# Patient Record
Sex: FEMALE | Race: WHITE | Employment: OTHER | URBAN - METROPOLITAN AREA
[De-identification: names, ages, dates, MRNs, and addresses within clinical notes are randomized per-mention and may not be internally consistent; named-entity substitution may affect disease eponyms.]

---

## 2018-07-27 ENCOUNTER — APPOINTMENT (OUTPATIENT)
Dept: CT IMAGING | Age: 83
DRG: 419 | End: 2018-07-27
Attending: PHYSICIAN ASSISTANT
Payer: MEDICARE

## 2018-07-27 ENCOUNTER — HOSPITAL ENCOUNTER (INPATIENT)
Age: 83
LOS: 3 days | Discharge: HOME | DRG: 419 | End: 2018-07-30
Attending: INTERNAL MEDICINE | Admitting: SURGERY
Payer: MEDICARE

## 2018-07-27 DIAGNOSIS — E87.1 HYPONATREMIA: ICD-10-CM

## 2018-07-27 DIAGNOSIS — K85.90 ACUTE PANCREATITIS, UNSPECIFIED COMPLICATION STATUS, UNSPECIFIED PANCREATITIS TYPE: Primary | ICD-10-CM

## 2018-07-27 DIAGNOSIS — K80.00 CALCULUS OF GALLBLADDER WITH ACUTE CHOLECYSTITIS WITHOUT OBSTRUCTION: ICD-10-CM

## 2018-07-27 DIAGNOSIS — K80.20 CHOLELITHIASIS WITHOUT CHOLECYSTITIS: ICD-10-CM

## 2018-07-27 LAB
ALBUMIN SERPL-MCNC: 3.2 G/DL (ref 3.4–5)
ALBUMIN/GLOB SERPL: 1 {RATIO} (ref 0.8–1.7)
ALP SERPL-CCNC: 67 U/L (ref 45–117)
ALT SERPL-CCNC: 20 U/L (ref 13–56)
ANION GAP SERPL CALC-SCNC: 5 MMOL/L (ref 3–18)
APPEARANCE UR: CLEAR
AST SERPL-CCNC: 26 U/L (ref 15–37)
BACTERIA URNS QL MICRO: ABNORMAL /HPF
BASOPHILS # BLD: 0 K/UL (ref 0–0.1)
BASOPHILS NFR BLD: 0 % (ref 0–2)
BILIRUB SERPL-MCNC: 0.6 MG/DL (ref 0.2–1)
BILIRUB UR QL: NEGATIVE
BUN SERPL-MCNC: 16 MG/DL (ref 7–18)
BUN/CREAT SERPL: 18 (ref 12–20)
CALCIUM SERPL-MCNC: 8.9 MG/DL (ref 8.5–10.1)
CHLORIDE SERPL-SCNC: 95 MMOL/L (ref 100–108)
CK MB CFR SERPL CALC: NORMAL % (ref 0–4)
CK MB SERPL-MCNC: <1 NG/ML (ref 5–25)
CK SERPL-CCNC: 59 U/L (ref 26–192)
CO2 SERPL-SCNC: 28 MMOL/L (ref 21–32)
COLOR UR: YELLOW
CREAT SERPL-MCNC: 0.88 MG/DL (ref 0.6–1.3)
DIFFERENTIAL METHOD BLD: ABNORMAL
EOSINOPHIL # BLD: 0.2 K/UL (ref 0–0.4)
EOSINOPHIL NFR BLD: 3 % (ref 0–5)
EPITH CASTS URNS QL MICRO: ABNORMAL /LPF (ref 0–5)
ERYTHROCYTE [DISTWIDTH] IN BLOOD BY AUTOMATED COUNT: 12.7 % (ref 11.6–14.5)
GLOBULIN SER CALC-MCNC: 3.3 G/DL (ref 2–4)
GLUCOSE SERPL-MCNC: 92 MG/DL (ref 74–99)
GLUCOSE UR STRIP.AUTO-MCNC: NEGATIVE MG/DL
HCT VFR BLD AUTO: 37.3 % (ref 35–45)
HGB BLD-MCNC: 12.6 G/DL (ref 12–16)
HGB UR QL STRIP: NEGATIVE
KETONES UR QL STRIP.AUTO: NEGATIVE MG/DL
LEUKOCYTE ESTERASE UR QL STRIP.AUTO: ABNORMAL
LIPASE SERPL-CCNC: 883 U/L (ref 73–393)
LYMPHOCYTES # BLD: 0.9 K/UL (ref 0.9–3.6)
LYMPHOCYTES NFR BLD: 13 % (ref 21–52)
MCH RBC QN AUTO: 31.9 PG (ref 24–34)
MCHC RBC AUTO-ENTMCNC: 33.8 G/DL (ref 31–37)
MCV RBC AUTO: 94.4 FL (ref 74–97)
MONOCYTES # BLD: 0.8 K/UL (ref 0.05–1.2)
MONOCYTES NFR BLD: 12 % (ref 3–10)
NEUTS SEG # BLD: 5.3 K/UL (ref 1.8–8)
NEUTS SEG NFR BLD: 72 % (ref 40–73)
NITRITE UR QL STRIP.AUTO: NEGATIVE
PH UR STRIP: 5.5 [PH] (ref 5–8)
PLATELET # BLD AUTO: 255 K/UL (ref 135–420)
PMV BLD AUTO: 9.9 FL (ref 9.2–11.8)
POTASSIUM SERPL-SCNC: 4.1 MMOL/L (ref 3.5–5.5)
PROT SERPL-MCNC: 6.5 G/DL (ref 6.4–8.2)
PROT UR STRIP-MCNC: NEGATIVE MG/DL
RBC # BLD AUTO: 3.95 M/UL (ref 4.2–5.3)
RBC #/AREA URNS HPF: ABNORMAL /HPF (ref 0–5)
SODIUM SERPL-SCNC: 128 MMOL/L (ref 136–145)
SP GR UR REFRACTOMETRY: 1.01 (ref 1–1.03)
TROPONIN I SERPL-MCNC: <0.02 NG/ML (ref 0–0.06)
UROBILINOGEN UR QL STRIP.AUTO: 0.2 EU/DL (ref 0.2–1)
WBC # BLD AUTO: 7.2 K/UL (ref 4.6–13.2)
WBC URNS QL MICRO: ABNORMAL /HPF (ref 0–5)

## 2018-07-27 PROCEDURE — 80053 COMPREHEN METABOLIC PANEL: CPT | Performed by: INTERNAL MEDICINE

## 2018-07-27 PROCEDURE — 96375 TX/PRO/DX INJ NEW DRUG ADDON: CPT

## 2018-07-27 PROCEDURE — 85025 COMPLETE CBC W/AUTO DIFF WBC: CPT | Performed by: INTERNAL MEDICINE

## 2018-07-27 PROCEDURE — 96374 THER/PROPH/DIAG INJ IV PUSH: CPT

## 2018-07-27 PROCEDURE — 83690 ASSAY OF LIPASE: CPT | Performed by: INTERNAL MEDICINE

## 2018-07-27 PROCEDURE — 81001 URINALYSIS AUTO W/SCOPE: CPT | Performed by: INTERNAL MEDICINE

## 2018-07-27 PROCEDURE — 65270000029 HC RM PRIVATE

## 2018-07-27 PROCEDURE — 96361 HYDRATE IV INFUSION ADD-ON: CPT

## 2018-07-27 PROCEDURE — 96372 THER/PROPH/DIAG INJ SC/IM: CPT

## 2018-07-27 PROCEDURE — 74011000250 HC RX REV CODE- 250: Performed by: SURGERY

## 2018-07-27 PROCEDURE — 93005 ELECTROCARDIOGRAM TRACING: CPT

## 2018-07-27 PROCEDURE — 82550 ASSAY OF CK (CPK): CPT | Performed by: INTERNAL MEDICINE

## 2018-07-27 PROCEDURE — 74011250636 HC RX REV CODE- 250/636: Performed by: PHYSICIAN ASSISTANT

## 2018-07-27 PROCEDURE — 74011636320 HC RX REV CODE- 636/320: Performed by: INTERNAL MEDICINE

## 2018-07-27 PROCEDURE — 99284 EMERGENCY DEPT VISIT MOD MDM: CPT

## 2018-07-27 PROCEDURE — 74177 CT ABD & PELVIS W/CONTRAST: CPT

## 2018-07-27 PROCEDURE — 99218 HC RM OBSERVATION: CPT

## 2018-07-27 PROCEDURE — 74011250636 HC RX REV CODE- 250/636: Performed by: SURGERY

## 2018-07-27 PROCEDURE — 74011000250 HC RX REV CODE- 250: Performed by: PHYSICIAN ASSISTANT

## 2018-07-27 RX ORDER — LOSARTAN POTASSIUM 50 MG/1
50 TABLET ORAL DAILY
Status: DISCONTINUED | OUTPATIENT
Start: 2018-07-28 | End: 2018-07-30 | Stop reason: HOSPADM

## 2018-07-27 RX ORDER — OMEPRAZOLE 20 MG/1
20 CAPSULE, DELAYED RELEASE ORAL DAILY
Status: DISCONTINUED | OUTPATIENT
Start: 2018-07-28 | End: 2018-07-30 | Stop reason: HOSPADM

## 2018-07-27 RX ORDER — FAMOTIDINE 10 MG/ML
20 INJECTION INTRAVENOUS
Status: COMPLETED | OUTPATIENT
Start: 2018-07-27 | End: 2018-07-27

## 2018-07-27 RX ORDER — TIMOLOL MALEATE 5 MG/ML
1 SOLUTION/ DROPS OPHTHALMIC 2 TIMES DAILY
Status: DISCONTINUED | OUTPATIENT
Start: 2018-07-27 | End: 2018-07-27

## 2018-07-27 RX ORDER — LATANOPROST 50 UG/ML
1 SOLUTION/ DROPS OPHTHALMIC EVERY EVENING
Status: DISCONTINUED | OUTPATIENT
Start: 2018-07-28 | End: 2018-07-30 | Stop reason: HOSPADM

## 2018-07-27 RX ORDER — OXYCODONE AND ACETAMINOPHEN 5; 325 MG/1; MG/1
1 TABLET ORAL
Status: DISCONTINUED | OUTPATIENT
Start: 2018-07-27 | End: 2018-07-30 | Stop reason: HOSPADM

## 2018-07-27 RX ORDER — SODIUM CHLORIDE AND POTASSIUM CHLORIDE .9; .15 G/100ML; G/100ML
SOLUTION INTRAVENOUS CONTINUOUS
Status: DISCONTINUED | OUTPATIENT
Start: 2018-07-27 | End: 2018-07-29

## 2018-07-27 RX ORDER — BRIMONIDINE TARTRATE, TIMOLOL MALEATE 2; 5 MG/ML; MG/ML
1 SOLUTION/ DROPS OPHTHALMIC EVERY 12 HOURS
Status: DISCONTINUED | OUTPATIENT
Start: 2018-07-27 | End: 2018-07-27 | Stop reason: RX

## 2018-07-27 RX ORDER — HYDROMORPHONE HYDROCHLORIDE 2 MG/ML
0.5 INJECTION, SOLUTION INTRAMUSCULAR; INTRAVENOUS; SUBCUTANEOUS
Status: DISCONTINUED | OUTPATIENT
Start: 2018-07-27 | End: 2018-07-29

## 2018-07-27 RX ORDER — DICYCLOMINE HYDROCHLORIDE 10 MG/ML
20 INJECTION INTRAMUSCULAR ONCE
Status: COMPLETED | OUTPATIENT
Start: 2018-07-27 | End: 2018-07-27

## 2018-07-27 RX ORDER — ONDANSETRON 2 MG/ML
4 INJECTION INTRAMUSCULAR; INTRAVENOUS
Status: DISCONTINUED | OUTPATIENT
Start: 2018-07-27 | End: 2018-07-30 | Stop reason: HOSPADM

## 2018-07-27 RX ORDER — METRONIDAZOLE 500 MG/100ML
500 INJECTION, SOLUTION INTRAVENOUS EVERY 6 HOURS
Status: DISCONTINUED | OUTPATIENT
Start: 2018-07-27 | End: 2018-07-29

## 2018-07-27 RX ORDER — SODIUM CHLORIDE 9 MG/ML
75 INJECTION, SOLUTION INTRAVENOUS CONTINUOUS
Status: DISCONTINUED | OUTPATIENT
Start: 2018-07-27 | End: 2018-07-27

## 2018-07-27 RX ORDER — ONDANSETRON 2 MG/ML
4 INJECTION INTRAMUSCULAR; INTRAVENOUS
Status: COMPLETED | OUTPATIENT
Start: 2018-07-27 | End: 2018-07-27

## 2018-07-27 RX ORDER — LATANOPROST 50 UG/ML
1 SOLUTION/ DROPS OPHTHALMIC EVERY EVENING
Status: DISCONTINUED | OUTPATIENT
Start: 2018-07-27 | End: 2018-07-27

## 2018-07-27 RX ORDER — ACETAMINOPHEN 325 MG/1
650 TABLET ORAL
Status: DISCONTINUED | OUTPATIENT
Start: 2018-07-27 | End: 2018-07-30 | Stop reason: HOSPADM

## 2018-07-27 RX ORDER — LEVOTHYROXINE SODIUM 100 UG/1
100 TABLET ORAL
Status: DISCONTINUED | OUTPATIENT
Start: 2018-07-28 | End: 2018-07-30 | Stop reason: HOSPADM

## 2018-07-27 RX ORDER — SODIUM CHLORIDE 0.9 % (FLUSH) 0.9 %
5-10 SYRINGE (ML) INJECTION EVERY 8 HOURS
Status: DISCONTINUED | OUTPATIENT
Start: 2018-07-27 | End: 2018-07-30 | Stop reason: HOSPADM

## 2018-07-27 RX ORDER — BRIMONIDINE TARTRATE 2 MG/ML
1 SOLUTION/ DROPS OPHTHALMIC EVERY 12 HOURS
Status: DISCONTINUED | OUTPATIENT
Start: 2018-07-28 | End: 2018-07-30 | Stop reason: HOSPADM

## 2018-07-27 RX ORDER — SODIUM CHLORIDE 0.9 % (FLUSH) 0.9 %
5-10 SYRINGE (ML) INJECTION AS NEEDED
Status: DISCONTINUED | OUTPATIENT
Start: 2018-07-27 | End: 2018-07-30 | Stop reason: HOSPADM

## 2018-07-27 RX ORDER — TIMOLOL MALEATE 5 MG/ML
1 SOLUTION/ DROPS OPHTHALMIC 2 TIMES DAILY
Status: DISCONTINUED | OUTPATIENT
Start: 2018-07-28 | End: 2018-07-30 | Stop reason: HOSPADM

## 2018-07-27 RX ORDER — BRIMONIDINE TARTRATE, TIMOLOL MALEATE 2; 5 MG/ML; MG/ML
1 SOLUTION/ DROPS OPHTHALMIC EVERY 12 HOURS
COMMUNITY

## 2018-07-27 RX ORDER — LOSARTAN POTASSIUM 50 MG/1
TABLET ORAL DAILY
COMMUNITY
End: 2020-03-02

## 2018-07-27 RX ORDER — BRIMONIDINE TARTRATE 2 MG/ML
1 SOLUTION/ DROPS OPHTHALMIC EVERY 12 HOURS
Status: DISCONTINUED | OUTPATIENT
Start: 2018-07-27 | End: 2018-07-27

## 2018-07-27 RX ADMIN — TIMOLOL MALEATE 1 DROP: 5 SOLUTION/ DROPS OPHTHALMIC at 23:06

## 2018-07-27 RX ADMIN — SODIUM CHLORIDE 1000 ML: 900 INJECTION, SOLUTION INTRAVENOUS at 14:13

## 2018-07-27 RX ADMIN — DICYCLOMINE HYDROCHLORIDE 20 MG: 20 INJECTION, SOLUTION INTRAMUSCULAR at 14:13

## 2018-07-27 RX ADMIN — SODIUM CHLORIDE 75 ML/HR: 900 INJECTION, SOLUTION INTRAVENOUS at 17:51

## 2018-07-27 RX ADMIN — IOPAMIDOL 100 ML: 612 INJECTION, SOLUTION INTRAVENOUS at 15:27

## 2018-07-27 RX ADMIN — SODIUM CHLORIDE AND POTASSIUM CHLORIDE: 9; 1.49 INJECTION, SOLUTION INTRAVENOUS at 21:02

## 2018-07-27 RX ADMIN — BRIMONIDINE TARTRATE 1 DROP: 2 SOLUTION OPHTHALMIC at 23:06

## 2018-07-27 RX ADMIN — METRONIDAZOLE 500 MG: 500 INJECTION, SOLUTION INTRAVENOUS at 21:02

## 2018-07-27 RX ADMIN — FAMOTIDINE 20 MG: 10 INJECTION, SOLUTION INTRAVENOUS at 14:13

## 2018-07-27 RX ADMIN — LATANOPROST 1 DROP: 50 SOLUTION OPHTHALMIC at 23:06

## 2018-07-27 RX ADMIN — ONDANSETRON 4 MG: 2 INJECTION INTRAMUSCULAR; INTRAVENOUS at 14:13

## 2018-07-27 NOTE — IP AVS SNAPSHOT
[address] [address] 
[phone] Patient: Matteo Elizalde MRN: [id number] :3/22/1927 About your hospitalization You were admitted on:  2018 You last received care in the:  [address] [place] [address] You were discharged on:  2018 Why you were hospitalized Your primary diagnosis was:  Not on File Your diagnoses also included:  Acute Pancreatitis, Cholelithiasis, Pancreatitis Follow-up Information Follow up With Details Comments Contact Info Chad Gonsalves MD On 2018 Follow up appointment scheduled for 2018 at 10:00 a.m. [address] [place] [address] [address] [address] 
[phone] MD Joe York [facility] 
[address] [place] [address] 
[phone] Discharge Orders None A check karen indicates which time of day the medication should be taken. My Medications START taking these medications Instructions Each Dose to Equal  
 Morning Noon Evening Bedtime HYDROcodone-acetaminophen 5-325 mg per tablet Commonly known as:  Vannesa Parth Your last dose was: Your next dose is: Take 1 Tab by mouth every six (6) hours as needed for Pain. Max Daily Amount: 4 Tabs. 1 Tab CONTINUE taking these medications Instructions Each Dose to Equal  
 Morning Noon Evening Bedtime  
 bimatoprost 0.01 % ophthalmic drops Commonly known as:  LUMIGAN Your last dose was: Your next dose is:    
   
   
 Administer 1 Drop to both eyes every evening. 1 Drop CALCIUM 600 + D 600-125 mg-unit Tab Generic drug:  calcium-cholecalciferol (d3) Your last dose was: Your next dose is: Take  by mouth. COMBIGAN 0.2-0.5 % Drop ophthalmic solution Generic drug:  brimonidine-timolol Your last dose was: Your next dose is:    
   
   
 1 Drop every twelve (12) hours. 1 Drop  
    
   
   
   
  
 levothyroxine 100 mcg tablet Commonly known as:  SYNTHROID Your last dose was: Your next dose is: Take 100 mcg by mouth Daily (before breakfast). 100 mcg  
    
   
   
   
  
 losartan 50 mg tablet Commonly known as:  COZAAR Your last dose was: Your next dose is: Take  by mouth daily. omeprazole 20 mg capsule Commonly known as:  PRILOSEC Your last dose was: Your next dose is: Take 20 mg by mouth daily. 20 mg Where to Get Your Medications Information on where to get these meds will be given to you by the nurse or doctor. ! Ask your nurse or doctor about these medications HYDROcodone-acetaminophen 5-325 mg per tablet Opioid Education Prescription Opioids: What You Need to Know: 
 
 
Acute Diagnoses: 
Acute pancreatitis Cholelithiasis Pancreatitis 
appendicitis Chronic Medical Diagnoses: 
Problem List as of 7/30/2018  Date Reviewed: 7/29/2018 Codes Class Noted - Resolved Acute pancreatitis ICD-10-CM: K85.90 ICD-9-CM: 616.3  7/27/2018 - Present Cholelithiasis ICD-10-CM: K80.20 ICD-9-CM: 574.20  7/27/2018 - Present Pancreatitis ICD-10-CM: K85.90 ICD-9-CM: 650.7  2018 - Present Diet 1. Resume prior to surgery diet as tolerated. Activity 1. Do not drive a car or operate any hazardous machinery the day of surgery. 2. Rest quietly today. 3. No bending or heavy lifting. 4. You may resume other prior to surgery activities as tolerated. 5. You may remove the bandage and shower in 1 day. Drain / Wound Care 1. Follow all drain / wound care instructions exactly as explained by the Nurse at time of discharge. 2. Apply an ice pack to the surgical site for 48 hours. 3. Do not put any salves or ointments on the wound. Allow it to form a dry scab. 4. Leave steri-strips / Dermabond alone. They should be allowed to fall off on their own in 7-14 days. Medications 1. It is important to take your medications exactly as they are prescribed. 2. Keep your medication in the bottles provided by the pharmacist, and keep a list of the medication names, dosages, and times they should be taken in your wallet. Call 911 anytime you think you may need emergency care. For example, call if: 
· You passed out (lost consciousness). · You have severe trouble breathing. · You have sudden chest pain and shortness of breath. Notify your Surgeon for any of the followin. Fever, chills, nausea, vomiting, severe abdominal pain or bleeding. 2. If you experience any redness or discharge or sign of infection. 3. Persistent nausea lasting more than 24 hours. If you are unable to reach your Surgeon for any of the symptoms above, you should proceed directly to the nearest Emergency Department. Post-Operative Appointment Information Call Dr. Lenny Patel office tomorrow morning at [phone] to schedule a post-operative office visit in one (1) week. If any questions or concerns arise, call your Surgeon at [phone]. Cholecystectomy: What to Expect at [facility] Your Recovery After your surgery, it is normal to feel weak and tired for several days after you return home. Your belly may be swollen. If you had laparoscopic surgery, you may also have pain in your shoulder for about 24 hours. You may have gas or need to burp a lot at first, and a few people get diarrhea. The diarrhea usually goes away in 2 to 4 weeks, but it may last longer. How quickly you recover depends on whether you had a laparoscopic or open surgery. · For a laparoscopic surgery, most people can go back to work or their normal routine in 1 to 2 weeks, but it may take longer, depending on the type of work you do. · For an open surgery, it will probably take 4 to 6 weeks before you get back to your normal routine. This care sheet gives you a general idea about how long it will take for you to recover. However, each person recovers at a different pace. Follow the steps below to get better as quickly as possible. How can you care for yourself at home? Activity 
  · Rest when you feel tired. Getting enough sleep will help you recover.  
  · Try to walk each day. Start out by walking a little more than you did the day before. Gradually increase the amount you walk. Walking boosts blood flow and helps prevent pneumonia and constipation.  
  · For about 2 to 4 weeks, avoid lifting anything that would make you strain. This may include a child, heavy grocery bags and milk containers, a heavy briefcase or backpack, cat litter or dog food bags, or a vacuum .  
  · Avoid strenuous activities, such as biking, jogging, weightlifting, and aerobic exercise, until your doctor says it is okay.  
  · You may shower 24 to 48 hours after surgery, if your doctor okays it. Pat the cut (incision) dry. Do not take a bath for the first 2 weeks, or until your doctor tells you it is okay.  
  · You may drive when you are no longer taking pain medicine and can quickly move your foot from the gas pedal to the brake.  You must also be able to sit comfortably for a long period of time, even if you do not plan to go far. You might get caught in traffic.  
  · For a laparoscopic surgery, most people can go back to work or their normal routine in 1 to 2 weeks, but it may take longer. For an open surgery, it will probably take 4 to 6 weeks before you get back to your normal routine.  
  · Your doctor will tell you when you can have sex again.  
 Diet 
  · Eat smaller meals more often instead of fewer larger meals. You can eat a normal diet, but avoid eating fatty foods for about 1 month. Fatty foods include hamburger, whole milk, cheese, and many snack foods. If your stomach is upset, try bland, low-fat foods like plain rice, broiled chicken, toast, and yogurt.  
  · Drink plenty of fluids (unless your doctor tells you not to).   · If you have diarrhea, try avoiding spicy foods, dairy products, fatty foods, and alcohol. You can also watch to see if specific foods cause it, and stop eating them. If the diarrhea continues for more than 2 weeks, talk to your doctor.  
  · You may notice that your bowel movements are not regular right after your surgery. This is common. Try to avoid constipation and straining with bowel movements. You may want to take a fiber supplement every day. If you have not had a bowel movement after a couple of days, ask your doctor about taking a mild laxative. Medicines 
  · Your doctor will tell you if and when you can restart your medicines. He or she will also give you instructions about taking any new medicines.  
  · If you take blood thinners, such as warfarin (Coumadin), clopidogrel (Plavix), or aspirin, be sure to talk to your doctor. He or she will tell you if and when to start taking those medicines again. Make sure that you understand exactly what your doctor wants you to do.  
  · Take pain medicines exactly as directed. ¨ If the doctor gave you a prescription medicine for pain, take it as prescribed. ¨ If you are not taking a prescription pain medicine, take an over-the-counter medicine such as acetaminophen (Tylenol), ibuprofen (Advil, Motrin), or naproxen (Aleve). Read and follow all instructions on the label. ¨ Do not take two or more pain medicines at the same time unless the doctor told you to. Many pain medicines contain acetaminophen, which is Tylenol. Too much Tylenol can be harmful.  
  · If you think your pain medicine is making you sick to your stomach: 
¨ Take your medicine after meals (unless your doctor tells you not to). ¨ Ask your doctor for a different pain medicine.  
  · If your doctor prescribed antibiotics, take them as directed. Do not stop taking them just because you feel better. You need to take the full course of antibiotics. Incision care 
  · If you have strips of tape on the incision, or cut, leave the tape on for a week or until it falls off.  
  · After 24 to 48 hours, wash the area daily with warm, soapy water, and pat it dry.  
  · You may have staples to hold the cut together. Keep them dry until your doctor takes them out. This is usually in 7 to 10 days.  
  · Keep the area clean and dry. You may cover it with a gauze bandage if it weeps or rubs against clothing. Change the bandage every day.  
 Ice 
  · To reduce swelling and pain, put ice or a cold pack on your belly for 10 to 20 minutes at a time. Do this every 1 to 2 hours. Put a thin cloth between the ice and your skin. Follow-up care is a key part of your treatment and safety. Be sure to make and go to all appointments, and call your doctor if you are having problems. It's also a good idea to know your test results and keep a list of the medicines you take. When should you call for help? Call 911 anytime you think you may need emergency care. For example, call if: 
  · You passed out (lost consciousness).  
  · You are short of breath. Gena Murrain your doctor now or seek immediate medical care if:   · You are sick to your stomach and cannot drink fluids.  
  · You have pain that does not get better when you take your pain medicine.  
  · You cannot pass stools or gas.  
  · You have signs of infection, such as: 
¨ Increased pain, swelling, warmth, or redness. ¨ Red streaks leading from the incision. ¨ Pus draining from the incision. ¨ A fever.  
  · Bright red blood has soaked through the bandage over your incision.  
  · You have loose stitches, or your incision comes open.  
  · You have signs of a blood clot in your leg (called a deep vein thrombosis), such as: 
¨ Pain in your calf, back of knee, thigh, or groin. ¨ Redness and swelling in your leg or groin.  
 Watch closely for any changes in your health, and be sure to contact your doctor if you have any problems. Where can you learn more? Go to http://wesley-joe.info/. Enter [phone] in the search box to learn more about \"Cholecystectomy: What to Expect at Home. \" Current as of: May 12, 2017 Content Version: 11.7 © 9501-4362 [employer] ([employer]). Care instructions adapted under license by [employer] (which disclaims liability or warranty for this information). If you have questions about a medical condition or this instruction, always ask your healthcare professional. [address] [address] any warranty or liability for your use of this information. DISCHARGE SUMMARY from Nurse PATIENT INSTRUCTIONS: 
 
 
F-face looks uneven A-arms unable to move or move unevenly S-speech slurred or non-existent T-time-call 911 as soon as signs and symptoms begin-DO NOT go Back to bed or wait to see if you get better-TIME IS BRAIN. Warning Signs of HEART ATTACK Call 911 if you have these symptoms: 
? Chest discomfort.  Most heart attacks involve discomfort in the center of the chest that lasts more than a few minutes, or that goes away and comes back. It can feel like uncomfortable pressure, squeezing, fullness, or pain. ? Discomfort in other areas of the upper body. Symptoms can include pain or discomfort in one or both arms, the back, neck, jaw, or stomach. ? Shortness of breath with or without chest discomfort. ? Other signs may include breaking out in a cold sweat, nausea, or lightheadedness. Don't wait more than five minutes to call [address]! Fast action can save your life. Calling 911 is almost always the fastest way to get lifesaving treatment. Emergency Medical Services staff can begin treatment when they arrive  up to an hour sooner than if someone gets to the hospital by car. The discharge information has been reviewed with the {PATIENT PARENT GUARDIAN:19634}. The {PATIENT PARENT GUARDIAN:09238} verbalized understanding. Discharge medications reviewed with the {Dishcarge meds reviewed RZUO:02249} and appropriate educational materials and side effects teaching were provided. ___________________________________________________________________________________________________________________________________ Introducing [facility]! Maricarmen Taylor introduces [employer] patient portal. Now you can access parts of your medical record, email your doctor's office, and request medication refills online. 1. In your internet browser, go to [email or web address] 2. Click on the First Time User? Click Here link in the Sign In box. You will see the New Member Sign Up page. 3. Enter your [employer] Access Code exactly as it appears below. You will not need to use this code after youve completed the sign-up process. If you do not sign up before the expiration date, you must request a new code. · [employer] Access Code: [email or web address] Expires: 10/25/2018 12:54 PM 
 
4. Enter the last four digits of your Social Security Number (xxxx) and Date of Birth (mm/dd/yyyy) as indicated and click Submit.  You will be taken to the next sign-up page. 5. Create a Intercloud Systemst ID. This will be your [employer] login ID and cannot be changed, so think of one that is secure and easy to remember. 6. Create a [employer] System[employer] password. You can change your password at any time. 7. Enter your Password Reset Question and Answer. This can be used at a later time if you forget your password. 8. Enter your e-mail address. You will receive e-mail notification when new information is available in [address] 9. Click Sign Up. You can now view and download portions of your medical record. 10. Click the Download Summary menu link to download a portable copy of your medical information. If you have questions, please visit the Frequently Asked Questions section of the [employer] website. Remember, [employer] is NOT to be used for urgent needs. For medical emergencies, dial 911. Now available from your iPhone and Android! Introducing Ken Powell As a [facility] patient, I wanted to make you aware of our electronic visit tool called Ken Dkdimitriospeggy. [facility] 24/7 allows you to connect within minutes with a medical provider 24 hours a day, seven days a week via a mobile device or tablet or logging into a secure website from your computer. You can access Ken Powell from anywhere in the [place]. A virtual visit might be right for you when you have a simple condition and feel like you just dont want to get out of bed, or cant get away from work for an appointment, when your regular [facility] provider is not available (evenings, weekends or holidays), or when youre out of town and need minor care. Electronic visits cost only $49 and if the [facility] 24/7 provider determines a prescription is needed to treat your condition, one can be electronically transmitted to a nearby pharmacy*. Please take a moment to enroll today if you have not already done so.   The enrollment process is free and takes just a few minutes. To enroll, please download the [employer] 24/7 danyel to your tablet or phone, or visit [email or web address] to enroll on your computer. And, as an [address] [place] [address] patient with a [employer] account, the results of your visits will be scanned into your electronic medical record and your primary care provider will be able to view the scanned results. We urge you to continue to see your regular [employer] provider for your ongoing medical care. And while your primary care provider may not be the one available when you seek a Sendbl[employer] virtual visit, the peace of mind you get from getting a real diagnosis real time can be priceless. For more information on [employer], view our Frequently Asked Questions (FAQs) at [email or web address]. Sincerely, 
 
Kristen Self MD 
Chief Medical Officer [facility] Desire Miranda *:  certain medications cannot be prescribed via Sendbl[employer] Providers Seen During Your Hospitalization Provider Specialty Primary office phone Beth Molina MD Emergency Medicine [phone] James Demarco MD General Surgery [phone] Your Primary Care Physician (PCP) Primary Care Physician Office Phone Office Fax Caleb Marco [phone] You are allergic to the following Allergen Reactions Levaquin (Levofloxacin) Other (comments) Tremors Pcn (Penicillins) Rash Recent Documentation Height Weight Breastfeeding? BMI OB Status Smoking Status 1.549 m 78.3 kg No 32.61 kg/m2 Postmenopausal Never Smoker Emergency Contacts Name Discharge Info Relation Home Work Mobile [address] CAREG[employer] [3] Child [2] [phone] Patient Belongings The following personal items are in your possession at time of discharge: Dental Appliances: At home  Visual Aid: Glasses, With patient   Hearing Aids/Status: Bilateral  Home Medications: None   Jewelry: None  Clothing: At bedside, Footwear, Pants, Shirt, Socks    Other Valuables: None Please provide this summary of care documentation to your next provider. Signatures-by signing, you are acknowledging that this After Visit Summary has been reviewed with you and you have received a copy. Patient Signature:  ____________________________________________________________ Date:  ____________________________________________________________  
  
Farhana De Jesus Provider Signature:  ____________________________________________________________ Date:  ____________________________________________________________

## 2018-07-27 NOTE — ROUTINE PROCESS
TRANSFER - OUT REPORT: 
 
Verbal report given to Harjeet Matias R.N(name) on Matteo Elizalde  being transferred to medical(unit) for routine progression of care Report consisted of patients Situation, Background, Assessment and  
Recommendations(SBAR). Information from the following report(s) SBAR and MAR was reviewed with the receiving nurse. Lines:  
Peripheral IV 07/27/18 Left Antecubital (Active) Site Assessment Clean, dry, & intact 7/27/2018  1:30 PM  
Phlebitis Assessment 0 7/27/2018  1:30 PM  
Infiltration Assessment 0 7/27/2018  1:30 PM  
Dressing Status Occlusive 7/27/2018  1:30 PM  
Hub Color/Line Status Pink 7/27/2018  1:30 PM  
Action Taken Blood drawn 7/27/2018  1:30 PM  
Alcohol Cap Used Yes 7/27/2018  1:30 PM  
  
 
Opportunity for questions and clarification was provided. Patient transported with: 
 [employer]

## 2018-07-27 NOTE — ED PROVIDER NOTES
EMERGENCY DEPARTMENT HISTORY AND PHYSICAL EXAM 
 
Date: 7/27/2018 Patient Name: Vic Soriano History of Presenting Illness Chief Complaint Patient presents with  Abdominal Pain History Provided By: Patient Chief Complaint: Abdominal pain Duration: Last night Timing:  Acute Location: Epigastric radiating \"all over\" especially to her back Quality: Stabbing Severity: 8 out of 10 Modifying Factors: Mild relief with heating pad Associated Symptoms: \"Bloated\" and nausea Additional History (Context):  
1:41 PM  
Vic Soriano is a 80 y.o. female with PMHX of diverticulitis, hypothyroid, HTN, GERD, and gallstones who presents to the emergency department C/O acute 8/10 stabbing epigastric abdominal pain, onset last night. Associated sxs include slight nausea. Pt states that the pain was \"severe\" last night and radiates \"all over\" especially to her back. Last BM was yesterday (forced). Pt states that she also feels extremely \"bloated. \" Mild relief with heating pad. Pt is unsure if this is similar to her hx of diverticulitis. Pt is allergic to Levaquin and Penicillins. Pt last had a colonoscopy in 1999. Pt denies V/D, fever, chills, dysuria, abdominal surgeries, and any other sxs or complaints. PCP: Arnold Gale MD 
 
Current Facility-Administered Medications Medication Dose Route Frequency Provider Last Rate Last Dose  
 0.9% sodium chloride infusion  75 mL/hr IntraVENous CONTINUOUS Hillary Steinberg PA-C 75 mL/hr at 07/27/18 1751 75 mL/hr at 07/27/18 1751 Current Outpatient Prescriptions Medication Sig Dispense Refill  losartan (COZAAR) 50 mg tablet Take  by mouth daily.  bimatoprost (LUMIGAN) 0.01 % ophthalmic drops Administer 1 Drop to both eyes every evening.  brimonidine-timolol (COMBIGAN) 0.2-0.5 % drop ophthalmic solution 1 Drop every twelve (12) hours.  levothyroxine (SYNTHROID) 100 mcg tablet Take 100 mcg by mouth Daily (before breakfast).  omeprazole (PRILOSEC) 20 mg capsule Take 20 mg by mouth daily.  calcium-cholecalciferol, d3, (CALCIUM 600 + D) 600-125 mg-unit tab Take  by mouth. Past History Past Medical History: 
Past Medical History:  
Diagnosis Date  Cancer ([address]  Diverticulitis  Hypertension  Hypothyroid Past Surgical History: 
Past Surgical History:  
Procedure Laterality Date  HX MASTECTOMY    
 right  HX ORTHOPAEDIC    
 shoulder Family History: 
History reviewed. No pertinent family history. Social History: 
Social History Substance Use Topics  Smoking status: Never Smoker  Smokeless tobacco: Never Used  Alcohol use No  
 
 
Allergies: Allergies Allergen Reactions  Levaquin [Levofloxacin] Other (comments) Tremors  Pcn [Penicillins] Rash Review of Systems Review of Systems Constitutional: Negative for appetite change, chills and fever. HENT: Negative for trouble swallowing. Respiratory: Negative for cough and shortness of breath. Cardiovascular: Negative for chest pain. Gastrointestinal: Positive for abdominal pain (epigastric), constipation and nausea. Negative for abdominal distention, blood in stool, diarrhea and vomiting.  
     (+) \"bloated\" Genitourinary: Negative for dysuria, hematuria and urgency. Musculoskeletal: Positive for back pain. Neurological: Negative for light-headedness. All other systems reviewed and are negative. Physical Exam  
 
Vitals:  
 07/27/18 1253 07/27/18 1748 BP: (!) 159/101 143/56 Pulse: 76 83 Resp: 14 24 Temp: 97.9 °F (36.6 °C) 99.3 °F (37.4 °C) SpO2: 100% 96% Weight: 73 kg (161 lb) Height: 5' 1\" (1.549 m) Physical Exam  
Constitutional: She is oriented to person, place, and time. She appears well-developed and well-nourished. No distress.  geriatric female in NAD. Alert. Ambulatory in ED room. Daughters at bedside.    
HENT:  
Head: Normocephalic and atraumatic. Right Ear: External ear normal.  
Left Ear: External ear normal.  
Nose: Nose normal.  
Mouth/Throat: Uvula is midline and oropharynx is clear and moist. Mucous membranes are dry. Eyes: Conjunctivae are normal. No scleral icterus. Neck: Normal range of motion. Cardiovascular: Normal rate, regular rhythm, normal heart sounds and intact distal pulses. Exam reveals no gallop and no friction rub. No murmur heard. Pulmonary/Chest: Effort normal and breath sounds normal. No accessory muscle usage. No tachypnea. No respiratory distress. She has no decreased breath sounds. She has no wheezes. She has no rhonchi. She has no rales. Abdominal: Soft. Normal appearance. She exhibits no ascites and no mass. There is tenderness in the epigastric area. There is no rigidity, no rebound, no guarding, no CVA tenderness, no tenderness at McBurney's point and negative Berry's sign. Musculoskeletal: Normal range of motion. Neurological: She is alert and oriented to person, place, and time. Skin: Skin is warm and dry. She is not diaphoretic. Psychiatric: She has a normal mood and affect. Judgment normal.  
Nursing note and vitals reviewed. Diagnostic Study Results Labs - Recent Results (from the past 12 hour(s)) EKG, 12 LEAD, INITIAL Collection Time: 07/27/18  1:07 PM  
Result Value Ref Range Ventricular Rate 74 BPM  
 Atrial Rate 74 BPM  
 P-R Interval 182 ms QRS Duration 130 ms  
 Q-T Interval 412 ms QTC Calculation (Bezet) 457 ms Calculated P Axis 68 degrees Calculated R Axis -76 degrees Calculated T Axis 27 degrees Diagnosis Normal sinus rhythm Right bundle branch block Left anterior fascicular block Bifascicular block Abnormal ECG When compared with ECG of 18-OCT-2014 08:04, 
NC interval has decreased Left anterior fascicular block is now present CBC WITH AUTOMATED DIFF Collection Time: 07/27/18  1:30 PM  
Result Value Ref Range  WBC 7.2 4.6 - 13.2 K/uL  
 RBC 3.95 (L) 4.20 - 5.30 M/uL  
 HGB 12.6 12.0 - 16.0 g/dL HCT 37.3 35.0 - 45.0 % MCV 94.4 74.0 - 97.0 FL  
 MCH 31.9 24.0 - 34.0 PG  
 MCHC 33.8 31.0 - 37.0 g/dL  
 RDW 12.7 11.6 - 14.5 % PLATELET 492 244 - 758 K/uL MPV 9.9 9.2 - 11.8 FL  
 NEUTROPHILS 72 40 - 73 % LYMPHOCYTES 13 (L) 21 - 52 % MONOCYTES 12 (H) 3 - 10 % EOSINOPHILS 3 0 - 5 % BASOPHILS 0 0 - 2 %  
 ABS. NEUTROPHILS 5.3 1.8 - 8.0 K/UL  
 ABS. LYMPHOCYTES 0.9 0.9 - 3.6 K/UL  
 ABS. MONOCYTES 0.8 0.05 - 1.2 K/UL  
 ABS. EOSINOPHILS 0.2 0.0 - 0.4 K/UL  
 ABS. BASOPHILS 0.0 0.0 - 0.1 K/UL  
 DF AUTOMATED METABOLIC PANEL, COMPREHENSIVE Collection Time: 07/27/18  1:30 PM  
Result Value Ref Range Sodium 128 (L) 136 - 145 mmol/L Potassium 4.1 3.5 - 5.5 mmol/L Chloride 95 (L) 100 - 108 mmol/L  
 CO2 28 21 - 32 mmol/L Anion gap 5 3.0 - 18 mmol/L Glucose 92 74 - 99 mg/dL BUN 16 7.0 - 18 MG/DL Creatinine 0.88 0.6 - 1.3 MG/DL  
 BUN/Creatinine ratio 18 12 - 20 GFR est AA >60 >60 ml/min/1.73m2 GFR est non-AA >60 >60 ml/min/1.73m2 Calcium 8.9 8.5 - 10.1 MG/DL Bilirubin, total 0.6 0.2 - 1.0 MG/DL  
 ALT (SGPT) 20 13 - 56 U/L  
 AST (SGOT) 26 15 - 37 U/L Alk. phosphatase 67 45 - 117 U/L Protein, total 6.5 6.4 - 8.2 g/dL Albumin 3.2 (L) 3.4 - 5.0 g/dL Globulin 3.3 2.0 - 4.0 g/dL A-G Ratio 1.0 0.8 - 1.7 LIPASE Collection Time: 07/27/18  1:30 PM  
Result Value Ref Range Lipase 883 (H) 73 - 393 U/L  
CARDIAC PANEL,(CK, CKMB & TROPONIN) Collection Time: 07/27/18  1:30 PM  
Result Value Ref Range CK 59 26 - 192 U/L  
 CK - MB <1.0 <3.6 ng/ml CK-MB Index  0.0 - 4.0 % CALCULATION NOT PERFORMED WHEN RESULT IS BELOW LINEAR LIMIT Troponin-I, Qt. <0.02 0.00 - 0.06 NG/ML  
URINALYSIS W/ RFLX MICROSCOPIC Collection Time: 07/27/18  1:40 PM  
Result Value Ref Range Color YELLOW Appearance CLEAR  Specific gravity 1.012 1.005 - 1.030    
 pH (UA) 5.5 5.0 - 8.0 Protein NEGATIVE  NEG mg/dL Glucose NEGATIVE  NEG mg/dL Ketone NEGATIVE  NEG mg/dL Bilirubin NEGATIVE  NEG Blood NEGATIVE  NEG Urobilinogen 0.2 0.2 - 1.0 EU/dL Nitrites NEGATIVE  NEG Leukocyte Esterase SMALL (A) NEG URINE MICROSCOPIC ONLY Collection Time: 07/27/18  1:40 PM  
Result Value Ref Range WBC 1 to 3 0 - 5 /hpf  
 RBC 1 to 3 0 - 5 /hpf Epithelial cells 1+ 0 - 5 /lpf Bacteria FEW (A) NEG /hpf Radiologic Studies -  
CT Results  (Last 48 hours) 07/27/18 1541  CT ABD PELV W CONT Final result Impression:  IMPRESSION:  
   
1. Mild fat stranding about the pancreatic head and uncinate process, in  
keeping with acute pancreatitis. No organized or drainable fluid collection. Normal pancreatic enhancement. 2. Cholelithiasis. 3. Hepatomegaly, unchanged. 4. Diverticulosis without findings of diverticulitis. 6. Small hiatal hernia. 7. Chronic appearing basilar reticular pulmonary opacities, potentially  
reflecting minor fibrotic change. Narrative:  EXAM: CT of the abdomen and pelvis INDICATION: Chest and abdominal pain, nausea. Groin pain. COMPARISON: Abdominal pelvic CT 4/6/2016 TECHNIQUE: Axial CT imaging of the abdomen and pelvis was performed without oral  
and with intravenous contrast. Multiplanar reformats were generated. One or more dose reduction techniques were used on this CT: automated exposure  
control, adjustment of the mAs and/or kVp according to patient's size, and  
iterative reconstruction techniques. The specific techniques utilized on this CT  
exam have been documented in the patient's electronic medical record.   
   
_______________ FINDINGS:  
   
LOWER CHEST: Minimal reticular opacities are present at each lung base. No  
pleural effusion or pneumonic opacity. Normal cardiac size with dense mitral  
annular calcification.  No pericardial effusion. LIVER, BILIARY: The liver is enlarged, measuring approximately 23 cm in  
craniocaudal span. No focal hepatic lesion. No biliary dilation. Several  
radiopaque gallstones are present within a nondistended gallbladder. PANCREAS: Pancreatic enhancement is normal. Faint peripancreatic stranding  
present involving the pancreatic head and uncinate process. No pancreatic ductal  
dilatation. No organized or drainable peripancreatic fluid collection. SPLEEN: Punctate splenic hypodensities are present, too small to further  
characterize, unchanged and favored to represent small cysts. ADRENALS: Normal.  
   
KIDNEYS/URETERS/BLADDER: No hydronephrosis involving either kidney. Renal  
parenchymal enhancement is symmetric. No nephrolithiasis. No distal ureteral or  
bladder stone. PELVIC ORGANS: Unremarkable. VASCULATURE: Diffuse aortobiiliac atherosclerotic calcification is present  
without evidence of aneurysmal dilatation. Right-sided gonadal vein phlebolith  
present. LYMPH NODES: Scattered subcentimeter mesenteric and retroperitoneal lymph nodes  
are present without evidence of abdominal or pelvic adenopathy. GASTROINTESTINAL TRACT: Small hiatal hernia. Normal caliber small and large  
intestine. No bowel obstruction. No free intraperitoneal gas. Scattered colonic  
diverticula are present without evidence to suggest superimposed diverticulitis. Normal appendix. BONES: No acute or aggressive osseous abnormalities identified. Vertebral body  
hemangiomata present in the T12, L1, and L5 vertebral bodies. Mild and diffuse  
spondylosis noted. OTHER: None.  
   
_______________ Medications given in the ED- Medications  
0.9% sodium chloride infusion (75 mL/hr IntraVENous New Bag 7/27/18 6747)  
sodium chloride 0.9 % bolus infusion 1,000 mL (0 mL IntraVENous IV Completed 7/27/18 1513) famotidine (PF) (PEPCID) injection 20 mg (20 mg IntraVENous Given 7/27/18 1413) ondansetron (ZOFRAN) injection 4 mg (4 mg IntraVENous Given 7/27/18 1413) dicyclomine (BENTYL) 10 mg/mL injection 20 mg (20 mg IntraMUSCular Given 7/27/18 1413) iopamidol (ISOVUE 300) 61 % contrast injection 100 mL (100 mL IntraVENous Given 7/27/18 1527) Medical Decision Making I am the first provider for this patient. I reviewed the vital signs, available nursing notes, past medical history, past surgical history, family history and social history. Vital Signs-Reviewed the patient's vital signs. Pulse Oximetry Analysis - 100% on RA  
 
EKG interpretation: (Preliminary) NSR @ 74 bpm. No STEMI. EKG read by Mary Garrett MD at 1:29 PM   
 
Records Reviewed: Nursing Notes Provider Notes (Medical Decision Making): ACS/MI, pancreatitis, hepatitis, GB, gastroenteritis, gastritis, PUD, GERD, constipation, diverticulitis, colitis, food borne, UTI/pyelo, stone. Doubt PE or dissection. Procedures: 
Procedures ED Course:  
1:41 PM Initial assessment performed. The patients presenting problems have been discussed, and they are in agreement with the care plan formulated and outlined with them. I have encouraged them to ask questions as they arise throughout their visit. 4:20 PM Consult: Discussed care with Mary Garrett MD, Specialty: ER attending. Standard discussion; including history of patients chief complaint, available diagnostic results, and treatment course. He agrees that she will likely be able to go home but recommends consult with general surgery. 4:59 PM Consult: Discussed care with Viv Syed MD, Specialty: General surgery. Standard discussion; including history of patients chief complaint, available diagnostic results, and treatment course. He recommends admission to his service for observation and he will see her for possible MRCP and discuss cholecystectomy. IVF for hyponatremia with 1L IVF already given.  Will start gentle hydration at 75 cc/hr. Clear liquid diet. Pt comfortable. Pain 4/10. No vomiting. Diagnosis and Disposition Critical Care Time: NONE Core Measures: 
For Hospitalized Patients: 
 
1. Hospitalization Decision Time: The decision to hospitalize the patient was made by [employer] at 4:59 PM on 7/27/2018 2. Aspirin: Aspirin was not given because the patient did not present with a stroke at the time of their Emergency Department evaluation 5:01 PM  Patient is being admitted to the hospital by José Miguel Wolf MD, general surgery. The results of their tests and reasons for their admission have been discussed with them and/or available family. They convey agreement and understanding for the need to be admitted and for their admission diagnosis. CONDITIONS ON ADMISSION: 
Sepsis is not present at the time of admission. Deep Vein Thrombosis is not present at the time of admission. Thrombosis is not present at the time of admission. Urinary Tract Infection is not present at the time of admission. MRSA is not present at the time of admission. Wound infection is not present at the time of admission. Pressure Ulcer is not present at the time of admission. CLINICAL IMPRESSION: 
 
1. Acute pancreatitis, unspecified complication status, unspecified pancreatitis type 2. Cholelithiasis without cholecystitis 3. Hyponatremia PLAN: 
1. Admit to general surgery for observation 
______________________________ Attestations: This note is prepared by Pastor Romy Butcher, acting as Scribe for [employer], [place]. [employer]:  The scribe's documentation has been prepared under my direction and personally reviewed by me in its entirety. I confirm that the note above accurately reflects all work, treatment, procedures, and medical decision making performed by me. 
_______________________________

## 2018-07-27 NOTE — IP AVS SNAPSHOT
[address] [address] 
[phone] Patient: Walt Aguirre MRN: [id number] :3/22/1927 A check karen indicates which time of day the medication should be taken. My Medications START taking these medications Instructions Each Dose to Equal  
 Morning Noon Evening Bedtime HYDROcodone-acetaminophen 5-325 mg per tablet Commonly known as:  Eren Singh Your last dose was: Your next dose is: Take 1 Tab by mouth every six (6) hours as needed for Pain. Max Daily Amount: 4 Tabs. 1 Tab CONTINUE taking these medications Instructions Each Dose to Equal  
 Morning Noon Evening Bedtime  
 bimatoprost 0.01 % ophthalmic drops Commonly known as:  LUMIGAN Your last dose was: Your next dose is:    
   
   
 Administer 1 Drop to both eyes every evening. 1 Drop CALCIUM 600 + D 600-125 mg-unit Tab Generic drug:  calcium-cholecalciferol (d3) Your last dose was: Your next dose is: Take  by mouth. COMBIGAN 0.2-0.5 % Drop ophthalmic solution Generic drug:  brimonidine-timolol Your last dose was: Your next dose is:    
   
   
 1 Drop every twelve (12) hours. 1 Drop  
    
   
   
   
  
 levothyroxine 100 mcg tablet Commonly known as:  SYNTHROID Your last dose was: Your next dose is: Take 100 mcg by mouth Daily (before breakfast). 100 mcg  
    
   
   
   
  
 losartan 50 mg tablet Commonly known as:  COZAAR Your last dose was: Your next dose is: Take  by mouth daily. omeprazole 20 mg capsule Commonly known as:  PRILOSEC Your last dose was: Your next dose is: Take 20 mg by mouth daily. 20 mg Where to Get Your Medications Information on where to get these meds will be given to you by the nurse or doctor. ! Ask your nurse or doctor about these medications HYDROcodone-acetaminophen 5-325 mg per tablet

## 2018-07-27 NOTE — ROUTINE PROCESS
[address] TRANSFER - IN REPORT: 
 
Verbal report received from [address] [place] [address] on Michael Mcnally  being received from PACU for routine post - op. Report consisted of patients Situation, Background, Assessment and  
Recommendations(SBAR). Information from the following report(s) SBAR, Kardex, OR Summary, Intake/Output and MAR was reviewed with the receiving nurse. Opportunity for questions and clarification was provided. Assessment to be completed upon patients arrival to unit and care assumed.

## 2018-07-27 NOTE — ED TRIAGE NOTES
Pt ambulated into er with complaints of abdominal pain that radiates to chest and to back x 4 days. Pt states she has history of diverticulitis.

## 2018-07-28 LAB
ALBUMIN SERPL-MCNC: 2.5 G/DL (ref 3.4–5)
ALBUMIN/GLOB SERPL: 0.9 {RATIO} (ref 0.8–1.7)
ALP SERPL-CCNC: 56 U/L (ref 45–117)
ALT SERPL-CCNC: 14 U/L (ref 13–56)
ANION GAP SERPL CALC-SCNC: 6 MMOL/L (ref 3–18)
AST SERPL-CCNC: 17 U/L (ref 15–37)
BASOPHILS # BLD: 0 K/UL (ref 0–0.1)
BASOPHILS NFR BLD: 0 % (ref 0–2)
BILIRUB SERPL-MCNC: 0.5 MG/DL (ref 0.2–1)
BUN SERPL-MCNC: 10 MG/DL (ref 7–18)
BUN/CREAT SERPL: 13 (ref 12–20)
CALCIUM SERPL-MCNC: 8.2 MG/DL (ref 8.5–10.1)
CHLORIDE SERPL-SCNC: 102 MMOL/L (ref 100–108)
CO2 SERPL-SCNC: 25 MMOL/L (ref 21–32)
CREAT SERPL-MCNC: 0.75 MG/DL (ref 0.6–1.3)
DIFFERENTIAL METHOD BLD: ABNORMAL
EOSINOPHIL # BLD: 0.1 K/UL (ref 0–0.4)
EOSINOPHIL NFR BLD: 2 % (ref 0–5)
ERYTHROCYTE [DISTWIDTH] IN BLOOD BY AUTOMATED COUNT: 12.9 % (ref 11.6–14.5)
GLOBULIN SER CALC-MCNC: 2.7 G/DL (ref 2–4)
GLUCOSE SERPL-MCNC: 98 MG/DL (ref 74–99)
HCT VFR BLD AUTO: 32.3 % (ref 35–45)
HGB BLD-MCNC: 10.9 G/DL (ref 12–16)
LIPASE SERPL-CCNC: 363 U/L (ref 73–393)
LYMPHOCYTES # BLD: 0.8 K/UL (ref 0.9–3.6)
LYMPHOCYTES NFR BLD: 15 % (ref 21–52)
MCH RBC QN AUTO: 32.2 PG (ref 24–34)
MCHC RBC AUTO-ENTMCNC: 33.7 G/DL (ref 31–37)
MCV RBC AUTO: 95.3 FL (ref 74–97)
MONOCYTES # BLD: 0.8 K/UL (ref 0.05–1.2)
MONOCYTES NFR BLD: 17 % (ref 3–10)
NEUTS SEG # BLD: 3.3 K/UL (ref 1.8–8)
NEUTS SEG NFR BLD: 66 % (ref 40–73)
PLATELET # BLD AUTO: 180 K/UL (ref 135–420)
PMV BLD AUTO: 9.7 FL (ref 9.2–11.8)
POTASSIUM SERPL-SCNC: 4 MMOL/L (ref 3.5–5.5)
PROT SERPL-MCNC: 5.2 G/DL (ref 6.4–8.2)
RBC # BLD AUTO: 3.39 M/UL (ref 4.2–5.3)
SODIUM SERPL-SCNC: 133 MMOL/L (ref 136–145)
WBC # BLD AUTO: 5 K/UL (ref 4.6–13.2)

## 2018-07-28 PROCEDURE — 83690 ASSAY OF LIPASE: CPT | Performed by: SURGERY

## 2018-07-28 PROCEDURE — 80053 COMPREHEN METABOLIC PANEL: CPT | Performed by: SURGERY

## 2018-07-28 PROCEDURE — 36415 COLL VENOUS BLD VENIPUNCTURE: CPT | Performed by: SURGERY

## 2018-07-28 PROCEDURE — 74011000250 HC RX REV CODE- 250: Performed by: SURGERY

## 2018-07-28 PROCEDURE — 74011250636 HC RX REV CODE- 250/636: Performed by: SURGERY

## 2018-07-28 PROCEDURE — 85025 COMPLETE CBC W/AUTO DIFF WBC: CPT | Performed by: SURGERY

## 2018-07-28 PROCEDURE — 65270000029 HC RM PRIVATE

## 2018-07-28 RX ADMIN — BRIMONIDINE TARTRATE 1 DROP: 2 SOLUTION OPHTHALMIC at 21:44

## 2018-07-28 RX ADMIN — TIMOLOL MALEATE 1 DROP: 5 SOLUTION/ DROPS OPHTHALMIC at 21:44

## 2018-07-28 RX ADMIN — METRONIDAZOLE 500 MG: 500 INJECTION, SOLUTION INTRAVENOUS at 07:12

## 2018-07-28 RX ADMIN — METRONIDAZOLE 500 MG: 500 INJECTION, SOLUTION INTRAVENOUS at 14:29

## 2018-07-28 RX ADMIN — SODIUM CHLORIDE AND POTASSIUM CHLORIDE: 9; 1.49 INJECTION, SOLUTION INTRAVENOUS at 18:45

## 2018-07-28 RX ADMIN — BRIMONIDINE TARTRATE 1 DROP: 2 SOLUTION OPHTHALMIC at 08:57

## 2018-07-28 RX ADMIN — LATANOPROST 1 DROP: 50 SOLUTION OPHTHALMIC at 20:46

## 2018-07-28 RX ADMIN — METRONIDAZOLE 500 MG: 500 INJECTION, SOLUTION INTRAVENOUS at 20:46

## 2018-07-28 RX ADMIN — SODIUM CHLORIDE AND POTASSIUM CHLORIDE: 9; 1.49 INJECTION, SOLUTION INTRAVENOUS at 07:12

## 2018-07-28 NOTE — PROGRESS NOTES
Chart reviewed. Pt admitted under MD Jordan for abdominal pain, possible MRCP and discussion of cholecystectomy. CM will follow for any transition of care needs. 1 Isaak Bliss Met with pt at bedside. Pt states she lives with her daughter, Speedy Blair. Pt states her daughter Speedy Blair drives her to [employer], however, she states she is able to drive. Pt states she has a cane. Pts PCP is MD Juan Manuel Tolentino. Pt has no discharge concerns identified. Pt states she thinks she will dc tomorrow 7/29. CM will cont to follow for transition of care needs. Reason for Admission:   Abdominal pain, possible MRCP and discussion cholecystectomy RRAT Score:     14, mod Do you (patient/family) have any concerns for transition/discharge? None identified Plan for utilizing home health:     Not at this time, pt states her daughter assistsher Likelihood of readmission?   mod Transition of Care Plan:     Discharge home with MD follow up and family assistance Care Management Interventions PCP Verified by CM: Yes Mode of Transport at Discharge: Other (see comment) (daughter) Transition of Care Consult (CM Consult): Discharge Planning Current Support Network: Nimisha (lives with daughter Speedy Blair) Confirm Follow Up Transport: Family Plan discussed with Pt/Family/Caregiver: Yes Discharge Location Discharge Placement: Home with family assistance

## 2018-07-28 NOTE — PROGRESS NOTES
2100- Pt ate jello, chicken broth, and juice. Informed that she is NPO except ice chips at midnight. Pt verbalized understanding. Shift summary: No acute events. Pt pleasant and cooperative. Had mild mid upper abdominal pain that was tolerable, pt stated that this pain has improved. Denied nausea. Tolerated jello, chicken broth, and juice last PM. NPO since midnight. Patient Vitals for the past 12 hrs: 
 Temp Pulse Resp BP SpO2  
07/28/18 0353 98.5 °F (36.9 °C) 76 14 112/52 97 %  
07/27/18 2302 98.4 °F (36.9 °C) 83 16 115/59 96 %

## 2018-07-28 NOTE — H&P
History & Physical 
 
Patient: Alan Urbano MRN: [id number]  CSN: [id number] YOB: 1927  Age: 80 y.o. Sex: female DOA: 7/27/2018 HPI:  
 
Alan Urbano is a 80 y.o. female who presents with abd pain and elevated lipase, gallstones on CT and by history. Past Medical History:  
Diagnosis Date  Cancer ([facility] [address]  Diverticulitis  Hypertension  Hypothyroid Past Surgical History:  
Procedure Laterality Date  HX MASTECTOMY    
 right  HX ORTHOPAEDIC    
 shoulder History reviewed. No pertinent family history. Social History Social History  Marital status:  Spouse name: N/A  
 Number of children: N/A  
 Years of education: N/A Social History Main Topics  Smoking status: Never Smoker  Smokeless tobacco: Never Used  Alcohol use No  
 Drug use: None  Sexual activity: Not Asked Other Topics Concern  None Social History Narrative Prior to Admission medications Medication Sig Start Date End Date Taking? Authorizing Provider  
losartan (COZAAR) 50 mg tablet Take  by mouth daily. Yes David Cruz MD  
bimatoprost (LUMIGAN) 0.01 % ophthalmic drops Administer 1 Drop to both eyes every evening. Yes David Cruz MD  
brimonidine-timolol (COMBIGAN) 0.2-0.5 % drop ophthalmic solution 1 Drop every twelve (12) hours. Yes David Cruz MD  
levothyroxine (SYNTHROID) 100 mcg tablet Take 100 mcg by mouth Daily (before breakfast). Yes David Cruz MD  
omeprazole (PRILOSEC) 20 mg capsule Take 20 mg by mouth daily. Yes David Cruz MD  
calcium-cholecalciferol, d3, (CALCIUM 600 + D) 600-125 mg-unit tab Take  by mouth. David Cruz MD  
 
 
Allergies Allergen Reactions  Levaquin [Levofloxacin] Other (comments) Tremors  Pcn [Penicillins] Rash Review of Systems A comprehensive review of systems was negative except for that written in the History of Present Illness.  
 
 
Physical Exam: Visit Vitals  /46 (BP 1 Location: Left arm, BP Patient Position: At rest)  Pulse 97  Temp 98.1 °F (36.7 °C)  Resp 16  
 Ht 5' 1\" (1.549 m)  Wt 75.6 kg (166 lb 10.7 oz)  SpO2 100%  Breastfeeding No  
 BMI 31.49 kg/m2 Physical Exam: 
Physical Exam:  
General:  Alert, cooperative, no distress, appears stated age. Eyes:  Conjunctivae/corneas clear. PERRL, EOMs intact. Fundi benign Ears:  Normal TMs and external ear canals both ears. Nose: Nares normal. Septum midline. Mucosa normal. No drainage or sinus tenderness. Mouth/Throat: Lips, mucosa, and tongue normal. Teeth and gums normal.  
Neck: Supple, symmetrical, trachea midline, no adenopathy, thyroid: no enlargement/tenderness/nodules, no carotid bruit and no JVD. Back:   Symmetric, no curvature. ROM normal. No CVA tenderness. Lungs:   Clear to auscultation bilaterally. Heart:  Regular rate and rhythm, S1, S2 normal, no murmur, click, rub or gallop. Abdomen:   Soft, mild tenderness epigastric area, obese. Bowel sounds normal. No masses,  No organomegaly. Extremities: Extremities normal, atraumatic, no cyanosis or edema. Pulses: 2+ and symmetric all extremities. Skin: Skin color, texture, turgor normal. No rashes or lesions Lymph nodes: Cervical, supraclavicular, and axillary nodes normal.  
Neurologic: CNII-XII intact. Normal strength, sensation and reflexes throughout. Labs Reviewed: All lab results for the last 24 hours reviewed. Assessment/Plan Active Problems: 
  Acute pancreatitis (7/27/2018) Cholelithiasis (7/27/2018) Pancreatitis (7/27/2018) Admit, IV abx, IVF, correct Na. Repeat lipase. Plan lap bijan in AM.

## 2018-07-29 ENCOUNTER — ANESTHESIA EVENT (OUTPATIENT)
Dept: SURGERY | Age: 83
DRG: 419 | End: 2018-07-29
Payer: MEDICARE

## 2018-07-29 ENCOUNTER — ANESTHESIA (OUTPATIENT)
Dept: SURGERY | Age: 83
DRG: 419 | End: 2018-07-29
Payer: MEDICARE

## 2018-07-29 LAB
ATRIAL RATE: 74 BPM
CALCULATED P AXIS, ECG09: 68 DEGREES
CALCULATED R AXIS, ECG10: -76 DEGREES
CALCULATED T AXIS, ECG11: 27 DEGREES
DIAGNOSIS, 93000: NORMAL
P-R INTERVAL, ECG05: 182 MS
Q-T INTERVAL, ECG07: 412 MS
QRS DURATION, ECG06: 130 MS
QTC CALCULATION (BEZET), ECG08: 457 MS
VENTRICULAR RATE, ECG03: 74 BPM

## 2018-07-29 PROCEDURE — 77030008477 HC STYL SATN SLP COVD -A: Performed by: NURSE ANESTHETIST, CERTIFIED REGISTERED

## 2018-07-29 PROCEDURE — 74011250636 HC RX REV CODE- 250/636: Performed by: SPECIALIST

## 2018-07-29 PROCEDURE — 77030008602 HC TRCR ENDOSC EPATH J&J -B: Performed by: SURGERY

## 2018-07-29 PROCEDURE — 77030008603 HC TRCR ENDOSC EPATH J&J -C: Performed by: SURGERY

## 2018-07-29 PROCEDURE — 77030002933 HC SUT MCRYL J&J -A: Performed by: SURGERY

## 2018-07-29 PROCEDURE — 74011250636 HC RX REV CODE- 250/636

## 2018-07-29 PROCEDURE — 77030039266 HC ADH SKN EXOFIN S2SG -A: Performed by: SURGERY

## 2018-07-29 PROCEDURE — 77030032490 HC SLV COMPR SCD KNE COVD -B

## 2018-07-29 PROCEDURE — 77030016151 HC PROTCTR LNS DFOG COVD -B: Performed by: SURGERY

## 2018-07-29 PROCEDURE — 74011250637 HC RX REV CODE- 250/637: Performed by: SURGERY

## 2018-07-29 PROCEDURE — 77030031139 HC SUT VCRL2 J&J -A: Performed by: SURGERY

## 2018-07-29 PROCEDURE — 77030018836 HC SOL IRR NACL ICUM -A: Performed by: SURGERY

## 2018-07-29 PROCEDURE — 77030032490 HC SLV COMPR SCD KNE COVD -B: Performed by: SURGERY

## 2018-07-29 PROCEDURE — 77030003580 HC NDL INSUF VERES J&J -B: Performed by: SURGERY

## 2018-07-29 PROCEDURE — 65270000029 HC RM PRIVATE

## 2018-07-29 PROCEDURE — 74011250636 HC RX REV CODE- 250/636: Performed by: SURGERY

## 2018-07-29 PROCEDURE — 74011000250 HC RX REV CODE- 250: Performed by: SURGERY

## 2018-07-29 PROCEDURE — 77030038020 HC MANFLD NEPTUNE STRY -B: Performed by: SURGERY

## 2018-07-29 PROCEDURE — 76010000138 HC OR TIME 0.5 TO 1 HR: Performed by: SURGERY

## 2018-07-29 PROCEDURE — 77030008683 HC TU ET CUF COVD -A: Performed by: NURSE ANESTHETIST, CERTIFIED REGISTERED

## 2018-07-29 PROCEDURE — 88304 TISSUE EXAM BY PATHOLOGIST: CPT | Performed by: SURGERY

## 2018-07-29 PROCEDURE — 76210000006 HC OR PH I REC 0.5 TO 1 HR: Performed by: SURGERY

## 2018-07-29 PROCEDURE — 77030008518 HC TBNG INSUF ENDO STRY -B: Performed by: SURGERY

## 2018-07-29 PROCEDURE — 77030018875 HC APPL CLP LIG4 J&J -B: Performed by: SURGERY

## 2018-07-29 PROCEDURE — 0FT44ZZ RESECTION OF GALLBLADDER, PERCUTANEOUS ENDOSCOPIC APPROACH: ICD-10-PCS | Performed by: SURGERY

## 2018-07-29 PROCEDURE — 77030013079 HC BLNKT BAIR HGGR 3M -A: Performed by: NURSE ANESTHETIST, CERTIFIED REGISTERED

## 2018-07-29 PROCEDURE — 77030032230 HC DSCTR ULTSONC CRDLSS COVD -E: Performed by: SURGERY

## 2018-07-29 PROCEDURE — 76060000032 HC ANESTHESIA 0.5 TO 1 HR: Performed by: SURGERY

## 2018-07-29 RX ORDER — ONDANSETRON 2 MG/ML
INJECTION INTRAMUSCULAR; INTRAVENOUS AS NEEDED
Status: DISCONTINUED | OUTPATIENT
Start: 2018-07-29 | End: 2018-07-29 | Stop reason: HOSPADM

## 2018-07-29 RX ORDER — SODIUM CHLORIDE, SODIUM LACTATE, POTASSIUM CHLORIDE, CALCIUM CHLORIDE 600; 310; 30; 20 MG/100ML; MG/100ML; MG/100ML; MG/100ML
50 INJECTION, SOLUTION INTRAVENOUS CONTINUOUS
Status: DISCONTINUED | OUTPATIENT
Start: 2018-07-29 | End: 2018-07-29 | Stop reason: HOSPADM

## 2018-07-29 RX ORDER — SODIUM CHLORIDE AND POTASSIUM CHLORIDE .9; .15 G/100ML; G/100ML
SOLUTION INTRAVENOUS CONTINUOUS
Status: DISCONTINUED | OUTPATIENT
Start: 2018-07-29 | End: 2018-07-30 | Stop reason: HOSPADM

## 2018-07-29 RX ORDER — FENTANYL CITRATE 50 UG/ML
INJECTION, SOLUTION INTRAMUSCULAR; INTRAVENOUS AS NEEDED
Status: DISCONTINUED | OUTPATIENT
Start: 2018-07-29 | End: 2018-07-29 | Stop reason: HOSPADM

## 2018-07-29 RX ORDER — OXYCODONE AND ACETAMINOPHEN 5; 325 MG/1; MG/1
1 TABLET ORAL AS NEEDED
Status: DISCONTINUED | OUTPATIENT
Start: 2018-07-29 | End: 2018-07-29 | Stop reason: HOSPADM

## 2018-07-29 RX ORDER — SODIUM CHLORIDE, SODIUM LACTATE, POTASSIUM CHLORIDE, CALCIUM CHLORIDE 600; 310; 30; 20 MG/100ML; MG/100ML; MG/100ML; MG/100ML
INJECTION, SOLUTION INTRAVENOUS
Status: DISCONTINUED | OUTPATIENT
Start: 2018-07-29 | End: 2018-07-29 | Stop reason: HOSPADM

## 2018-07-29 RX ORDER — FENTANYL CITRATE 50 UG/ML
25 INJECTION, SOLUTION INTRAMUSCULAR; INTRAVENOUS
Status: DISCONTINUED | OUTPATIENT
Start: 2018-07-29 | End: 2018-07-29 | Stop reason: HOSPADM

## 2018-07-29 RX ORDER — GLYCOPYRROLATE 0.2 MG/ML
INJECTION INTRAMUSCULAR; INTRAVENOUS AS NEEDED
Status: DISCONTINUED | OUTPATIENT
Start: 2018-07-29 | End: 2018-07-29 | Stop reason: HOSPADM

## 2018-07-29 RX ORDER — HYDROMORPHONE HYDROCHLORIDE 2 MG/ML
0.5 INJECTION, SOLUTION INTRAMUSCULAR; INTRAVENOUS; SUBCUTANEOUS
Status: DISCONTINUED | OUTPATIENT
Start: 2018-07-29 | End: 2018-07-29 | Stop reason: HOSPADM

## 2018-07-29 RX ORDER — PROPOFOL 10 MG/ML
INJECTION, EMULSION INTRAVENOUS AS NEEDED
Status: DISCONTINUED | OUTPATIENT
Start: 2018-07-29 | End: 2018-07-29 | Stop reason: HOSPADM

## 2018-07-29 RX ORDER — LIDOCAINE HYDROCHLORIDE 20 MG/ML
INJECTION, SOLUTION EPIDURAL; INFILTRATION; INTRACAUDAL; PERINEURAL AS NEEDED
Status: DISCONTINUED | OUTPATIENT
Start: 2018-07-29 | End: 2018-07-29 | Stop reason: HOSPADM

## 2018-07-29 RX ORDER — DEXAMETHASONE SODIUM PHOSPHATE 4 MG/ML
INJECTION, SOLUTION INTRA-ARTICULAR; INTRALESIONAL; INTRAMUSCULAR; INTRAVENOUS; SOFT TISSUE AS NEEDED
Status: DISCONTINUED | OUTPATIENT
Start: 2018-07-29 | End: 2018-07-29 | Stop reason: HOSPADM

## 2018-07-29 RX ORDER — ROCURONIUM BROMIDE 10 MG/ML
INJECTION, SOLUTION INTRAVENOUS AS NEEDED
Status: DISCONTINUED | OUTPATIENT
Start: 2018-07-29 | End: 2018-07-29 | Stop reason: HOSPADM

## 2018-07-29 RX ORDER — ONDANSETRON 2 MG/ML
4 INJECTION INTRAMUSCULAR; INTRAVENOUS ONCE
Status: DISCONTINUED | OUTPATIENT
Start: 2018-07-29 | End: 2018-07-29 | Stop reason: HOSPADM

## 2018-07-29 RX ORDER — SODIUM CHLORIDE 0.9 % (FLUSH) 0.9 %
5-10 SYRINGE (ML) INJECTION AS NEEDED
Status: DISCONTINUED | OUTPATIENT
Start: 2018-07-29 | End: 2018-07-29 | Stop reason: HOSPADM

## 2018-07-29 RX ORDER — NALOXONE HYDROCHLORIDE 0.4 MG/ML
0.1 INJECTION, SOLUTION INTRAMUSCULAR; INTRAVENOUS; SUBCUTANEOUS
Status: DISCONTINUED | OUTPATIENT
Start: 2018-07-29 | End: 2018-07-29 | Stop reason: HOSPADM

## 2018-07-29 RX ADMIN — GLYCOPYRROLATE 0.6 MG: 0.2 INJECTION INTRAMUSCULAR; INTRAVENOUS at 08:54

## 2018-07-29 RX ADMIN — HYDROMORPHONE HYDROCHLORIDE 0.5 MG: 2 INJECTION, SOLUTION INTRAMUSCULAR; INTRAVENOUS; SUBCUTANEOUS at 09:25

## 2018-07-29 RX ADMIN — SODIUM CHLORIDE AND POTASSIUM CHLORIDE: 9; 1.49 INJECTION, SOLUTION INTRAVENOUS at 18:43

## 2018-07-29 RX ADMIN — PROPOFOL 100 MG: 10 INJECTION, EMULSION INTRAVENOUS at 08:22

## 2018-07-29 RX ADMIN — FENTANYL CITRATE 50 MCG: 50 INJECTION, SOLUTION INTRAMUSCULAR; INTRAVENOUS at 08:45

## 2018-07-29 RX ADMIN — FENTANYL CITRATE 50 MCG: 50 INJECTION, SOLUTION INTRAMUSCULAR; INTRAVENOUS at 08:00

## 2018-07-29 RX ADMIN — GLYCOPYRROLATE 0.2 MG: 0.2 INJECTION INTRAMUSCULAR; INTRAVENOUS at 08:00

## 2018-07-29 RX ADMIN — TIMOLOL MALEATE 1 DROP: 5 SOLUTION/ DROPS OPHTHALMIC at 09:00

## 2018-07-29 RX ADMIN — ONDANSETRON 4 MG: 2 INJECTION INTRAMUSCULAR; INTRAVENOUS at 08:00

## 2018-07-29 RX ADMIN — FENTANYL CITRATE 50 MCG: 50 INJECTION, SOLUTION INTRAMUSCULAR; INTRAVENOUS at 08:22

## 2018-07-29 RX ADMIN — LOSARTAN POTASSIUM 50 MG: 50 TABLET ORAL at 16:03

## 2018-07-29 RX ADMIN — BRIMONIDINE TARTRATE 1 DROP: 2 SOLUTION OPHTHALMIC at 09:00

## 2018-07-29 RX ADMIN — LIDOCAINE HYDROCHLORIDE 100 MG: 20 INJECTION, SOLUTION EPIDURAL; INFILTRATION; INTRACAUDAL; PERINEURAL at 08:09

## 2018-07-29 RX ADMIN — FENTANYL CITRATE 50 MCG: 50 INJECTION, SOLUTION INTRAMUSCULAR; INTRAVENOUS at 09:00

## 2018-07-29 RX ADMIN — PROPOFOL 100 MG: 10 INJECTION, EMULSION INTRAVENOUS at 08:09

## 2018-07-29 RX ADMIN — METRONIDAZOLE 500 MG: 500 INJECTION, SOLUTION INTRAVENOUS at 02:13

## 2018-07-29 RX ADMIN — OMEPRAZOLE 20 MG: 20 CAPSULE, DELAYED RELEASE ORAL at 16:03

## 2018-07-29 RX ADMIN — Medication 5 ML: at 14:00

## 2018-07-29 RX ADMIN — METRONIDAZOLE 500 MG: 500 INJECTION, SOLUTION INTRAVENOUS at 14:15

## 2018-07-29 RX ADMIN — HYDROMORPHONE HYDROCHLORIDE 0.5 MG: 2 INJECTION, SOLUTION INTRAMUSCULAR; INTRAVENOUS; SUBCUTANEOUS at 09:34

## 2018-07-29 RX ADMIN — SODIUM CHLORIDE, SODIUM LACTATE, POTASSIUM CHLORIDE, CALCIUM CHLORIDE: 600; 310; 30; 20 INJECTION, SOLUTION INTRAVENOUS at 08:00

## 2018-07-29 RX ADMIN — ONDANSETRON 4 MG: 2 INJECTION INTRAMUSCULAR; INTRAVENOUS at 14:15

## 2018-07-29 RX ADMIN — SODIUM CHLORIDE AND POTASSIUM CHLORIDE: 9; 1.49 INJECTION, SOLUTION INTRAVENOUS at 21:46

## 2018-07-29 RX ADMIN — ACETAMINOPHEN 650 MG: 325 TABLET, FILM COATED ORAL at 21:50

## 2018-07-29 RX ADMIN — DEXAMETHASONE SODIUM PHOSPHATE 4 MG: 4 INJECTION, SOLUTION INTRA-ARTICULAR; INTRALESIONAL; INTRAMUSCULAR; INTRAVENOUS; SOFT TISSUE at 08:00

## 2018-07-29 RX ADMIN — ROCURONIUM BROMIDE 30 MG: 10 INJECTION, SOLUTION INTRAVENOUS at 08:09

## 2018-07-29 NOTE — PROGRESS NOTES
1935 
Bedside and Verbal shift change report given to ASHLEY BundyRN by Wen Cornejo RN. Report included the following information SBAR, Kardex, OR Summary, Intake/Output and MAR.

## 2018-07-29 NOTE — ANESTHESIA POSTPROCEDURE EVALUATION
Post-Anesthesia Evaluation and Assessment Cardiovascular Function/Vital Signs Visit Vitals  /64  Pulse 72  Temp 36.8 °C (98.2 °F)  Resp 19  
 Ht 5' 1\" (1.549 m)  Wt 76.6 kg (168 lb 14 oz)  SpO2 95%  Breastfeeding No  
 BMI 31.91 kg/m2 Patient is status post Procedure(s): LAPAROSCOPIC CHOLECYSTECTOMY. Nausea/Vomiting: Controlled. Postoperative hydration reviewed and adequate. Pain: 
Pain Scale 1: FLACC (07/29/18 0931) Pain Intensity 1: 0 (07/29/18 0931) Managed. Neurological Status:  
Neuro (WDL): Within Defined Limits (07/29/18 0931) At baseline. Mental Status and Level of Consciousness: Arousable. Pulmonary Status:  
O2 Device: Nasal cannula (07/29/18 0915) Adequate oxygenation and airway patent. Complications related to anesthesia: None Post-anesthesia assessment completed. No concerns. Patient has met all discharge requirements. Signed By: Jaycob Gray CRNA July 29, 2018

## 2018-07-29 NOTE — ROUTINE PROCESS
TRANSFER - OUT REPORT: 
 
Verbal report given to Johnmaría Reyes (name) on Dayanna Sedan  being transferred to Pre-Op (unit) for ordered procedure Report consisted of patients Situation, Background, Assessment and  
Recommendations(SBAR). Information from the following report(s) SBAR, Kardex and MAR was reviewed with the receiving nurse. Lines:  
Peripheral IV 07/27/18 Left Antecubital (Active) Site Assessment Clean, dry, & intact 7/28/2018  8:00 PM  
Phlebitis Assessment 0 7/28/2018  8:00 PM  
Infiltration Assessment 0 7/28/2018  8:00 PM  
Dressing Status Clean, dry, & intact 7/28/2018  8:00 PM  
Dressing Type Tape;Transparent 7/28/2018  8:00 PM  
Hub Color/Line Status Pink; Infusing 7/28/2018  8:00 PM  
Action Taken Open ports on tubing capped 7/28/2018  9:00 AM  
Alcohol Cap Used Yes 7/28/2018  8:00 PM  
  
 
Opportunity for questions and clarification was provided. Patient transported with: 
 [employer].

## 2018-07-29 NOTE — PERIOP NOTES
TRANSFER - IN REPORT: 
 
Verbal report received from ORN and CRNA (name) on Ermclaudio Retort  being received from OR (unit) for routine progression of care Report consisted of patients Situation, Background, Assessment and  
Recommendations(SBAR). Information from the following report(s) SBAR, Kardex, OR Summary, Procedure Summary, Intake/Output, MAR and Recent Results was reviewed with the receiving nurse. Opportunity for questions and clarification was provided. Assessment completed upon patients arrival to unit and care assumed.

## 2018-07-29 NOTE — PERIOP NOTES
TRANSFER - OUT REPORT: 
 
Verbal report given to Naveed Monroy RN (name) on Walt Aguirre  being transferred to  (unit) for routine progression of care Report consisted of patients Situation, Background, Assessment and  
Recommendations(SBAR). Information from the following report(s) SBAR, Kardex, OR Summary, Procedure Summary, Intake/Output, MAR and Recent Results was reviewed with the receiving nurse. Lines:  
Peripheral IV 07/27/18 Left Antecubital (Active) Site Assessment Clean, dry, & intact 7/29/2018  9:39 AM  
Phlebitis Assessment 0 7/29/2018  9:39 AM  
Infiltration Assessment 0 7/29/2018  9:39 AM  
Dressing Status Clean, dry, & intact 7/29/2018  9:39 AM  
Dressing Type Transparent 7/29/2018  9:39 AM  
Hub Color/Line Status Pink; Infusing 7/29/2018  9:39 AM  
Action Taken Open ports on tubing capped 7/28/2018  9:00 AM  
Alcohol Cap Used Yes 7/28/2018  8:00 PM  
  
 
Opportunity for questions and clarification was provided. Patient transported with: 
 Registered Nurse

## 2018-07-29 NOTE — BRIEF OP NOTE
BRIEF OPERATIVE NOTE Date of Procedure: 7/29/2018 Preoperative Diagnosis: GALL STONES Postoperative Diagnosis: GALL STONES Procedure(s): LAPAROSCOPIC CHOLECYSTECTOMY Surgeon(s) and Role: Taty Sharma MD - Primary Surgical Assistant: none Surgical Staff: 
Circ-1: Berenice Saleem RN Scrub Tech-1: Richelle Martinez Surg Asst-1: Rosiyoni Ken Event Time In Incision Start 8791 Incision Close Anesthesia: General  
Estimated Blood Loss: min Specimens:  
ID Type Source Tests Collected by Time Destination 1 : gall bladder Preservative Gallbladder  Bernabe Ascencio MD 7/29/2018 1514 Pathology Findings: gallstones Complications: none Implants: * No implants in log *

## 2018-07-29 NOTE — PROGRESS NOTES
0900 
Assumed care at this time. Assessment completed in flowsheet. Pt is alert and oriented x 4. Denies SOB and chest pain. Pt lungs clear bilaterally. Capillary refill less than 3 seconds. Pt has 20G IV to the [facility], [place], operated by [facility] . Call light and possessions within reach. Bed is in the lowest position. Will continue to monitor. Family at bedside

## 2018-07-29 NOTE — PERIOP NOTES
Transported patient to room. Patient alert and oriented with no acute distress noted. Vital signs within normal limits. Dressing clean dry and intact. Dual skin assessment performed with LUKAS Roberts. No further questions from receiving nurse

## 2018-07-29 NOTE — PROGRESS NOTES
1930- Pt care received. Pt resting in bed. Denies pain. 2130- Consent signed for lap bijan and placed in chart. PM CHG bath given and linens changed. Reminded her that she is NPO at midnight. Pt verbalized understanding. Offered clear liquids, pt declined at this time. 0205- Pt sleeping. NAD. 4528- Pt voided. Completed oral care. CHG wipes and nasal antisepsis given. Linens and gown changed. 0700- Family at bedside.

## 2018-07-29 NOTE — ROUTINE PROCESS
Bedside and Verbal shift change report given to Brooke Hallman RN (oncoming nurse) by Kim Quevedo RN 
 (offgoing nurse). Report included the following information SBAR, Kardex, MAR and Recent Results.

## 2018-07-29 NOTE — ANESTHESIA PREPROCEDURE EVALUATION
Anesthetic History No history of anesthetic complications Review of Systems / Medical History Patient summary reviewed, nursing notes reviewed and pertinent labs reviewed Pulmonary Within defined limits Neuro/Psych Within defined limits Cardiovascular Hypertension: well controlled Exercise tolerance: <4 METS 
  
GI/Hepatic/Renal 
  
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 
 
Airway Mallampati: II 
TM Distance: 4 - 6 cm Neck ROM: normal range of motion Mouth opening: Normal 
 
 Cardiovascular Dental 
 
Dentition: Lower partial plate and Upper partial plate Pulmonary Abdominal 
 
 
 
 Other Findings Anesthetic Plan ASA: 2, emergent Anesthesia type: general 
 
 
 
 
Induction: Intravenous Anesthetic plan and risks discussed with: Patient and Son / Daughter {Atient and daughter understand remote possibility of postoperative ventilation and potential for GA to lead to some postoperative confusion.

## 2018-07-29 NOTE — OP NOTES
OPERATIVE NOTE    Patient: Brody Stanley MRN: [id number]  [facility]: [id number]    YOB: 1927  Age: 80 y.o. Sex: female      Indications: This is a 80y.o. year-old female who presents with gallstones. Date of Procedure: 7/29/2018     Preoperative Diagnosis: Cholelithiasis. Postoperative Diagnosis: Cholelithiasis. Procedure: Procedure(s):   LAPAROSCOPIC CHOLECYSTECTOMY    Surgeon(s): Surgeon(s) and Role:     * Bora Edouard MD - Primary    Anesthesia: General     Procedure: The patient was placed in the supine position. Sedation was achieved by anesthesia. Her abdomen was prepped and draped in the usual fashion. An incision was made over the umbilicus with a blade and a Veress needle was inserted using 1-drop technique. A 5-mm Optiview trocar was placed under visualization. A 12-mm port was placed in the upper abdomen, and two 5-mm ports placed on the right side of the abdomen. The gallbladder was visualized. The remainder of the abdominal examination was unremarkable. The gallbladder was retracted laterally and superiorly, exposing the infundibulum. The infundibulum was dissected down to the cystic duct and cystic artery carefully. Both of these structures were identified and confirmed, they were ligated and divided using metal clips and laparoscopic scissors. The gallbladder was removed from the gallbladder fossa using the J-hook electrocautery. The gallbladder was removed through the top trocar port without difficulty. Adequate hemostasis was seen. All ports were removed. Skin was closed with 4-0 Monocryl in subcuticular closure and Dermabond. The patient was extubated and transferred to the recovery room in stable condition.     Estimated Blood Loss: * No values recorded between 7/29/2018  8:22 AM and 7/29/2018  8:53 AM *    Specimens:   ID Type Source Tests Collected by Time Destination   1 : gall bladder Preservative Gallbladder  Bora Edouard MD 7/29/2018 8241 Pathology        Drains: none      Complications: None; the patient tolerated the procedure well.     James Demarco MD  7/29/2018  8:53 AM

## 2018-07-30 VITALS
WEIGHT: 172.6 LBS | HEART RATE: 91 BPM | OXYGEN SATURATION: 98 % | SYSTOLIC BLOOD PRESSURE: 127 MMHG | HEIGHT: 61 IN | RESPIRATION RATE: 16 BRPM | DIASTOLIC BLOOD PRESSURE: 63 MMHG | BODY MASS INDEX: 32.59 KG/M2 | TEMPERATURE: 99.1 F

## 2018-07-30 PROCEDURE — 74011250636 HC RX REV CODE- 250/636

## 2018-07-30 PROCEDURE — 74011250637 HC RX REV CODE- 250/637: Performed by: SURGERY

## 2018-07-30 PROCEDURE — 74011000250 HC RX REV CODE- 250

## 2018-07-30 RX ORDER — HYDROCODONE BITARTRATE AND ACETAMINOPHEN 5; 325 MG/1; MG/1
1 TABLET ORAL
Qty: 10 TAB | Refills: 0 | Status: SHIPPED | OUTPATIENT
Start: 2018-07-30 | End: 2019-03-30

## 2018-07-30 RX ADMIN — OMEPRAZOLE 20 MG: 20 CAPSULE, DELAYED RELEASE ORAL at 08:43

## 2018-07-30 RX ADMIN — LEVOTHYROXINE SODIUM 100 MCG: 100 TABLET ORAL at 08:43

## 2018-07-30 RX ADMIN — LOSARTAN POTASSIUM 50 MG: 50 TABLET ORAL at 08:43

## 2018-07-30 RX ADMIN — ACETAMINOPHEN 650 MG: 325 TABLET, FILM COATED ORAL at 04:09

## 2018-07-30 NOTE — PROGRESS NOTES
Patient stated she did not want percocet. \"too strong\" tolerating the tylenol for pain control. Anticipate discharge today Patient Vitals for the past 12 hrs: 
 Temp Pulse Resp BP SpO2  
07/30/18 0829 99.1 °F (37.3 °C) 91 16 127/63 98 % 07/30/18 0333 98.2 °F (36.8 °C) 95 16 150/64 98 %  
07/29/18 2321 98.3 °F (36.8 °C) 90 16 124/56 92 % Bedside shift change report given to Aaliyah Ortega Rn (oncoming nurse) by Ellis Cortes Rn (offgoing nurse). Report included the following information SBAR, Kardex, Procedure Summary, MAR and Recent Results.

## 2018-07-30 NOTE — DISCHARGE INSTRUCTIONS
Post-Operative Discharge Instructions  Veronica Santos. Pamela Schulte M.D.  [address] [place] [address]  [phone]    Patient: Zachery Feliciano MRN: [id number]  CSN: [id number]    YOB: 1927  Age: 80 y.o. Sex: female    DOA: 7/27/2018 LOS:  LOS: 3 days   Discharge Date:      Acute Diagnoses:  Acute pancreatitis  Cholelithiasis  Pancreatitis  appendicitis    Chronic Medical Diagnoses:  Problem List as of 7/30/2018  Date Reviewed: 7/29/2018          Codes Class Noted - Resolved    Acute pancreatitis ICD-10-CM: K85.90  ICD-9-CM: 577.0  7/27/2018 - Present        Cholelithiasis ICD-10-CM: K80.20  ICD-9-CM: 574.20  7/27/2018 - Present        Pancreatitis ICD-10-CM: K85.90  ICD-9-CM: 200.4  7/27/2018 - Present              Diet  1. Resume prior to surgery diet as tolerated. Activity  1. Do not drive a car or operate any hazardous machinery the day of surgery. 2. Rest quietly today. 3. No bending or heavy lifting. 4. You may resume other prior to surgery activities as tolerated. 5. You may remove the bandage and shower in 1 day. Drain / Wound Care  1. Follow all drain / wound care instructions exactly as explained by the Nurse at time of discharge. 2. Apply an ice pack to the surgical site for 48 hours. 3. Do not put any salves or ointments on the wound. Allow it to form a dry scab. 4. Leave steri-strips / Dermabond alone. They should be allowed to fall off on their own in 7-14 days. Medications  1. It is important to take your medications exactly as they are prescribed. 2. Keep your medication in the bottles provided by the pharmacist, and keep a list of the medication names, dosages, and times they should be taken in your wallet. Call 911 anytime you think you may need emergency care. For example, call if:  · You passed out (lost consciousness). · You have severe trouble breathing. · You have sudden chest pain and shortness of breath.     Notify your Surgeon for any of the followin. Fever, chills, nausea, vomiting, severe abdominal pain or bleeding. 2. If you experience any redness or discharge or sign of infection. 3. Persistent nausea lasting more than 24 hours. If you are unable to reach your Surgeon for any of the symptoms above, you should proceed directly to the nearest Emergency Department. Post-Operative Appointment Information    Call Dr. Aurea Azevedo office tomorrow morning at [phone] to schedule a post-operative office visit in one (1) week. If any questions or concerns arise, call your Surgeon at [phone]. Cholecystectomy: What to Expect at [address] [facility]  After your surgery, it is normal to feel weak and tired for several days after you return home. Your belly may be swollen. If you had laparoscopic surgery, you may also have pain in your shoulder for about 24 hours. You may have gas or need to burp a lot at first, and a few people get diarrhea. The diarrhea usually goes away in 2 to 4 weeks, but it may last longer. How quickly you recover depends on whether you had a laparoscopic or open surgery. · For a laparoscopic surgery, most people can go back to work or their normal routine in 1 to 2 weeks, but it may take longer, depending on the type of work you do. · For an open surgery, it will probably take 4 to 6 weeks before you get back to your normal routine. This care sheet gives you a general idea about how long it will take for you to recover. However, each person recovers at a different pace. Follow the steps below to get better as quickly as possible. How can you care for yourself at home? Activity    · Rest when you feel tired. Getting enough sleep will help you recover.     · Try to walk each day. Start out by walking a little more than you did the day before. Gradually increase the amount you walk.  Walking boosts blood flow and helps prevent pneumonia and constipation.     · For about 2 to 4 weeks, avoid lifting anything that would make you strain. This may include a child, heavy grocery bags and milk containers, a heavy briefcase or backpack, cat litter or dog food bags, or a vacuum .     · Avoid strenuous activities, such as biking, jogging, weightlifting, and aerobic exercise, until your doctor says it is okay.     · You may shower 24 to 48 hours after surgery, if your doctor okays it. Pat the cut (incision) dry. Do not take a bath for the first 2 weeks, or until your doctor tells you it is okay.     · You may drive when you are no longer taking pain medicine and can quickly move your foot from the gas pedal to the brake. You must also be able to sit comfortably for a long period of time, even if you do not plan to go far. You might get caught in traffic.     · For a laparoscopic surgery, most people can go back to work or their normal routine in 1 to 2 weeks, but it may take longer. For an open surgery, it will probably take 4 to 6 weeks before you get back to your normal routine.     · Your doctor will tell you when you can have sex again.    Diet    · Eat smaller meals more often instead of fewer larger meals. You can eat a normal diet, but avoid eating fatty foods for about 1 month. Fatty foods include hamburger, whole milk, cheese, and many snack foods. If your stomach is upset, try bland, low-fat foods like plain rice, broiled chicken, toast, and yogurt.     · Drink plenty of fluids (unless your doctor tells you not to).   · If you have diarrhea, try avoiding spicy foods, dairy products, fatty foods, and alcohol. You can also watch to see if specific foods cause it, and stop eating them. If the diarrhea continues for more than 2 weeks, talk to your doctor.     · You may notice that your bowel movements are not regular right after your surgery. This is common. Try to avoid constipation and straining with bowel movements. You may want to take a fiber supplement every day.  If you have not had a bowel movement after a couple of days, ask your doctor about taking a mild laxative. Medicines    · Your doctor will tell you if and when you can restart your medicines. He or she will also give you instructions about taking any new medicines.     · If you take blood thinners, such as warfarin (Coumadin), clopidogrel (Plavix), or aspirin, be sure to talk to your doctor. He or she will tell you if and when to start taking those medicines again. Make sure that you understand exactly what your doctor wants you to do.     · Take pain medicines exactly as directed. ¨ If the doctor gave you a prescription medicine for pain, take it as prescribed. ¨ If you are not taking a prescription pain medicine, take an over-the-counter medicine such as acetaminophen (Tylenol), ibuprofen (Advil, Motrin), or naproxen (Aleve). Read and follow all instructions on the label. ¨ Do not take two or more pain medicines at the same time unless the doctor told you to. Many pain medicines contain acetaminophen, which is Tylenol. Too much Tylenol can be harmful.     · If you think your pain medicine is making you sick to your stomach:  ¨ Take your medicine after meals (unless your doctor tells you not to). ¨ Ask your doctor for a different pain medicine.     · If your doctor prescribed antibiotics, take them as directed. Do not stop taking them just because you feel better. You need to take the full course of antibiotics. Incision care    · If you have strips of tape on the incision, or cut, leave the tape on for a week or until it falls off.     · After 24 to 48 hours, wash the area daily with warm, soapy water, and pat it dry.     · You may have staples to hold the cut together. Keep them dry until your doctor takes them out. This is usually in 7 to 10 days.     · Keep the area clean and dry. You may cover it with a gauze bandage if it weeps or rubs against clothing.  Change the bandage every day.    Ice    · To reduce swelling and pain, put ice or a cold pack on your belly for 10 to 20 minutes at a time. Do this every 1 to 2 hours. Put a thin cloth between the ice and your skin. Follow-up care is a key part of your treatment and safety. Be sure to make and go to all appointments, and call your doctor if you are having problems. It's also a good idea to know your test results and keep a list of the medicines you take. When should you call for help? Call 911 anytime you think you may need emergency care. For example, call if:    · You passed out (lost consciousness).     · You are short of breath. Gaylia Appl your doctor now or seek immediate medical care if:    · You are sick to your stomach and cannot drink fluids.     · You have pain that does not get better when you take your pain medicine.     · You cannot pass stools or gas.     · You have signs of infection, such as:  ¨ Increased pain, swelling, warmth, or redness. ¨ Red streaks leading from the incision. ¨ Pus draining from the incision. ¨ A fever.     · Bright red blood has soaked through the bandage over your incision.     · You have loose stitches, or your incision comes open.     · You have signs of a blood clot in your leg (called a deep vein thrombosis), such as:  ¨ Pain in your calf, back of knee, thigh, or groin. ¨ Redness and swelling in your leg or groin.    Watch closely for any changes in your health, and be sure to contact your doctor if you have any problems. Where can you learn more? Go to http://wesley-joe.info/. Enter [phone] 75 [id number] in the search box to learn more about \"Cholecystectomy: What to Expect at Home. \"  Current as of: May 12, 2017  Content Version: 11.7  © 5992-6256 [employer]. Care instructions adapted under license by [employer] (which disclaims liability or warranty for this information).  If you have questions about a medical condition or this instruction, always ask your healthcare professional. [employer] disclaims any warranty or liability for your use of this information. DISCHARGE SUMMARY from Nurse    PATIENT INSTRUCTIONS:    After general anesthesia or intravenous sedation, for 24 hours or while taking prescription Narcotics:  · Limit your activities  · Do not drive and operate hazardous machinery  · Do not make important personal or business decisions  · Do  not drink alcoholic beverages  · If you have not urinated within 8 hours after discharge, please contact your surgeon on call. Report the following to your surgeon:  · Excessive pain, swelling, redness or odor of or around the surgical area  · Temperature over 100.5  · Nausea and vomiting lasting longer than 4 hours or if unable to take medications  · Any signs of decreased circulation or nerve impairment to extremity: change in color, persistent  numbness, tingling, coldness or increase pain  · Any questions          *  Please give a list of your current medications to your Primary Care Provider. *  Please update this list whenever your medications are discontinued, doses are      changed, or new medications (including over-the-counter products) are added. *  Please carry medication information at all times in case of emergency situations. These are general instructions for a healthy lifestyle:    No smoking/ No tobacco products/ Avoid exposure to second hand smoke  Surgeon General's Warning:  Quitting smoking now greatly reduces serious risk to your health. Obesity, smoking, and sedentary lifestyle greatly increases your risk for illness    A healthy diet, regular physical exercise & weight monitoring are important for maintaining a healthy lifestyle    You may be retaining fluid if you have a history of heart failure or if you experience any of the following symptoms:  Weight gain of 3 pounds or more overnight or 5 pounds in a week, increased swelling in our hands or feet or shortness of breath while lying flat in bed. Please call your doctor as soon as you notice any of these symptoms; do not wait until your next office visit. Recognize signs and symptoms of STROKE:    F-face looks uneven    A-arms unable to move or move unevenly    S-speech slurred or non-existent    T-time-call 911 as soon as signs and symptoms begin-DO NOT go       Back to bed or wait to see if you get better-TIME IS BRAIN. Warning Signs of HEART ATTACK     Call 911 if you have these symptoms:   Chest discomfort. Most heart attacks involve discomfort in the center of the chest that lasts more than a few minutes, or that goes away and comes back. It can feel like uncomfortable pressure, squeezing, fullness, or pain.  Discomfort in other areas of the upper body. Symptoms can include pain or discomfort in one or both arms, the back, neck, jaw, or stomach.  Shortness of breath with or without chest discomfort.  Other signs may include breaking out in a cold sweat, nausea, or lightheadedness. Don't wait more than five minutes to call 911 - MINUTES MATTER! Fast action can save your life. Calling 911 is almost always the fastest way to get lifesaving treatment. Emergency Medical Services staff can begin treatment when they arrive -- up to an hour sooner than if someone gets to the hospital by car. The discharge information has been reviewed with the {PATIENT PARENT GUARDIAN:89761}. The {PATIENT PARENT GUARDIAN:94117} verbalized understanding. Discharge medications reviewed with the {Dishcarge meds reviewed AWTO:25819} and appropriate educational materials and side effects teaching were provided.   ___________________________________________________________________________________________________________________________________

## 2018-07-30 NOTE — PROGRESS NOTES
Discharge instructions given to pt. And daughter, verbalized understanding. No concerns or complaints.

## 2018-07-30 NOTE — PROGRESS NOTES
Assumed pt care. Receive bedside and verbal SBAR from Zak 70. Alert. A&Ox4. Pt up to side of bed. Daughter in room. Pt to be discharged today. RN name and phone number written on white board. Waiting for discharge orders from Dr Shyanne Nino. Bed in lowest position. Call bell w/ in reach. Will continue to monitor. 0900 Two eye drop medications were not in pt room, pt specific, or pyxis drawers. Pt stated this has been an issue the entire hospital stay. Pt stated she is going home today and will administer drops when she gets home. Discharge RN to discharge pt.

## 2018-08-08 NOTE — DISCHARGE SUMMARY
Discharge Summary    Patient: Oneyda Reyna MRN: [id number]  CSN: [id number]    YOB: 1927  Age: 80 y.o. Sex: female    DOA: 7/27/2018 LOS:  LOS: 3 days   Discharge Date: 7/30/2018     Admission Diagnoses: Acute pancreatitis  Cholelithiasis  Pancreatitis  appendicitis    Discharge Diagnoses:    Problem List as of 7/30/2018  Date Reviewed: 7/29/2018          Codes Class Noted - Resolved    Acute pancreatitis ICD-10-CM: K85.90  ICD-9-CM: 577.0  7/27/2018 - Present        Cholelithiasis ICD-10-CM: K80.20  ICD-9-CM: 574.20  7/27/2018 - Present        Pancreatitis ICD-10-CM: K85.90  ICD-9-CM: 825.2  7/27/2018 - Present              Discharge Condition: Good    Hospital Course: Admitted for cholecystitis, had lap bijan. Consults: None    Significant Diagnostic Studies: see chart    Discharge Medications:   Cannot display discharge medications since this patient is not currently admitted.       Activity: Activity as tolerated and no driving for today    Diet: Regular Diet    Wound Care: None needed    Follow-up: 1 week

## 2019-03-30 ENCOUNTER — HOSPITAL ENCOUNTER (EMERGENCY)
Age: 84
Discharge: HOME | End: 2019-03-30
Attending: EMERGENCY MEDICINE
Payer: MEDICARE

## 2019-03-30 ENCOUNTER — APPOINTMENT (OUTPATIENT)
Dept: GENERAL RADIOLOGY | Age: 84
End: 2019-03-30
Attending: EMERGENCY MEDICINE
Payer: MEDICARE

## 2019-03-30 VITALS
BODY MASS INDEX: 30.4 KG/M2 | HEIGHT: 61 IN | OXYGEN SATURATION: 97 % | WEIGHT: 161 LBS | SYSTOLIC BLOOD PRESSURE: 117 MMHG | TEMPERATURE: 98.8 F | RESPIRATION RATE: 18 BRPM | DIASTOLIC BLOOD PRESSURE: 51 MMHG | HEART RATE: 96 BPM

## 2019-03-30 DIAGNOSIS — R50.9 FEVER, UNSPECIFIED FEVER CAUSE: ICD-10-CM

## 2019-03-30 DIAGNOSIS — B34.9 VIRAL ILLNESS: ICD-10-CM

## 2019-03-30 DIAGNOSIS — R52 BODY ACHES: ICD-10-CM

## 2019-03-30 DIAGNOSIS — E87.1 HYPONATREMIA: Primary | ICD-10-CM

## 2019-03-30 DIAGNOSIS — E83.51 HYPOCALCEMIA: ICD-10-CM

## 2019-03-30 LAB
ALBUMIN SERPL-MCNC: 3.4 G/DL (ref 3.4–5)
ALBUMIN/GLOB SERPL: 1.3 {RATIO} (ref 0.8–1.7)
ALP SERPL-CCNC: 84 U/L (ref 45–117)
ALT SERPL-CCNC: 19 U/L (ref 13–56)
ANION GAP SERPL CALC-SCNC: 10 MMOL/L (ref 3–18)
APPEARANCE UR: CLEAR
AST SERPL-CCNC: 23 U/L (ref 15–37)
BASOPHILS # BLD: 0 K/UL (ref 0–0.1)
BASOPHILS NFR BLD: 0 % (ref 0–2)
BILIRUB SERPL-MCNC: 0.5 MG/DL (ref 0.2–1)
BILIRUB UR QL: NEGATIVE
BUN SERPL-MCNC: 10 MG/DL (ref 7–18)
BUN/CREAT SERPL: 13 (ref 12–20)
CALCIUM SERPL-MCNC: 8.1 MG/DL (ref 8.5–10.1)
CHLORIDE SERPL-SCNC: 98 MMOL/L (ref 100–108)
CK MB CFR SERPL CALC: NORMAL % (ref 0–4)
CK MB SERPL-MCNC: <1 NG/ML (ref 5–25)
CK SERPL-CCNC: 34 U/L (ref 26–192)
CO2 SERPL-SCNC: 23 MMOL/L (ref 21–32)
COLOR UR: YELLOW
CREAT SERPL-MCNC: 0.76 MG/DL (ref 0.6–1.3)
DIFFERENTIAL METHOD BLD: ABNORMAL
EOSINOPHIL # BLD: 0 K/UL (ref 0–0.4)
EOSINOPHIL NFR BLD: 0 % (ref 0–5)
ERYTHROCYTE [DISTWIDTH] IN BLOOD BY AUTOMATED COUNT: 12.7 % (ref 11.6–14.5)
FLUAV AG NPH QL IA: NEGATIVE
FLUBV AG NOSE QL IA: NEGATIVE
GLOBULIN SER CALC-MCNC: 2.6 G/DL (ref 2–4)
GLUCOSE SERPL-MCNC: 127 MG/DL (ref 74–99)
GLUCOSE UR STRIP.AUTO-MCNC: NEGATIVE MG/DL
HCT VFR BLD AUTO: 35.9 % (ref 35–45)
HGB BLD-MCNC: 12.4 G/DL (ref 12–16)
HGB UR QL STRIP: NEGATIVE
KETONES UR QL STRIP.AUTO: 15 MG/DL
LEUKOCYTE ESTERASE UR QL STRIP.AUTO: NEGATIVE
LIPASE SERPL-CCNC: 126 U/L (ref 73–393)
LYMPHOCYTES # BLD: 0.5 K/UL (ref 0.9–3.6)
LYMPHOCYTES NFR BLD: 5 % (ref 21–52)
MCH RBC QN AUTO: 33.2 PG (ref 24–34)
MCHC RBC AUTO-ENTMCNC: 34.5 G/DL (ref 31–37)
MCV RBC AUTO: 96.2 FL (ref 74–97)
MONOCYTES # BLD: 0.7 K/UL (ref 0.05–1.2)
MONOCYTES NFR BLD: 7 % (ref 3–10)
NEUTS SEG # BLD: 9.5 K/UL (ref 1.8–8)
NEUTS SEG NFR BLD: 88 % (ref 40–73)
NITRITE UR QL STRIP.AUTO: NEGATIVE
PH UR STRIP: 5.5 [PH] (ref 5–8)
PLATELET # BLD AUTO: 212 K/UL (ref 135–420)
PMV BLD AUTO: 9.4 FL (ref 9.2–11.8)
POTASSIUM SERPL-SCNC: 3.5 MMOL/L (ref 3.5–5.5)
PROT SERPL-MCNC: 6 G/DL (ref 6.4–8.2)
PROT UR STRIP-MCNC: NEGATIVE MG/DL
RBC # BLD AUTO: 3.73 M/UL (ref 4.2–5.3)
SODIUM SERPL-SCNC: 131 MMOL/L (ref 136–145)
SP GR UR REFRACTOMETRY: 1.02 (ref 1–1.03)
TROPONIN I SERPL-MCNC: <0.02 NG/ML (ref 0–0.04)
UROBILINOGEN UR QL STRIP.AUTO: 0.2 EU/DL (ref 0.2–1)
WBC # BLD AUTO: 10.8 K/UL (ref 4.6–13.2)

## 2019-03-30 PROCEDURE — 82550 ASSAY OF CK (CPK): CPT

## 2019-03-30 PROCEDURE — 94640 AIRWAY INHALATION TREATMENT: CPT

## 2019-03-30 PROCEDURE — 71046 X-RAY EXAM CHEST 2 VIEWS: CPT

## 2019-03-30 PROCEDURE — 81003 URINALYSIS AUTO W/O SCOPE: CPT

## 2019-03-30 PROCEDURE — 80053 COMPREHEN METABOLIC PANEL: CPT

## 2019-03-30 PROCEDURE — 74011000250 HC RX REV CODE- 250: Performed by: NURSE PRACTITIONER

## 2019-03-30 PROCEDURE — 85025 COMPLETE CBC W/AUTO DIFF WBC: CPT

## 2019-03-30 PROCEDURE — 99284 EMERGENCY DEPT VISIT MOD MDM: CPT

## 2019-03-30 PROCEDURE — 83690 ASSAY OF LIPASE: CPT

## 2019-03-30 PROCEDURE — 74011250636 HC RX REV CODE- 250/636: Performed by: NURSE PRACTITIONER

## 2019-03-30 PROCEDURE — 77030013140 HC MSK NEB VYRM -A

## 2019-03-30 PROCEDURE — 93005 ELECTROCARDIOGRAM TRACING: CPT

## 2019-03-30 PROCEDURE — 87804 INFLUENZA ASSAY W/OPTIC: CPT

## 2019-03-30 PROCEDURE — 87081 CULTURE SCREEN ONLY: CPT

## 2019-03-30 RX ORDER — ALBUTEROL SULFATE 90 UG/1
2 AEROSOL, METERED RESPIRATORY (INHALATION)
Qty: 1 INHALER | Refills: 0 | Status: SHIPPED | OUTPATIENT
Start: 2019-03-30 | End: 2020-03-02

## 2019-03-30 RX ORDER — IPRATROPIUM BROMIDE AND ALBUTEROL SULFATE 2.5; .5 MG/3ML; MG/3ML
3 SOLUTION RESPIRATORY (INHALATION)
Status: COMPLETED | OUTPATIENT
Start: 2019-03-30 | End: 2019-03-30

## 2019-03-30 RX ORDER — SODIUM CHLORIDE 9 MG/ML
500 INJECTION, SOLUTION INTRAVENOUS CONTINUOUS
Status: DISCONTINUED | OUTPATIENT
Start: 2019-03-30 | End: 2019-03-30 | Stop reason: HOSPADM

## 2019-03-30 RX ORDER — ALBUTEROL SULFATE 90 UG/1
2 AEROSOL, METERED RESPIRATORY (INHALATION)
Qty: 1 INHALER | Refills: 0 | Status: SHIPPED | OUTPATIENT
Start: 2019-03-30 | End: 2019-03-30

## 2019-03-30 RX ADMIN — SODIUM CHLORIDE 500 ML: 900 INJECTION, SOLUTION INTRAVENOUS at 17:38

## 2019-03-30 RX ADMIN — IPRATROPIUM BROMIDE AND ALBUTEROL SULFATE 3 ML: .5; 3 SOLUTION RESPIRATORY (INHALATION) at 17:39

## 2019-03-30 NOTE — ED PROVIDER NOTES
EMERGENCY DEPARTMENT HISTORY AND PHYSICAL EXAM 
 
Date: 3/30/2019 Patient Name: Jessie Pathak History of Presenting Illness Chief Complaint Patient presents with  Cough  Sore Throat History Provided By: Patient and daughter Additional History (Context):  
5:09 PM 
Jessie Pathak is a 80 y.o. female with PMHX cancer, diverticulitis, hypertension, hypothyroid presents to the ED c/o body aches, sore throat, cough, started today. The patient states that she has had a cough and a sore throat for the past 2 days but woke up this morning with extreme body aches and chills. She states that her fever was 100.4 at home and she took 500 mg of Tylenol with minimal relief. Her pain is a 6 out of 10 generalized over her entire body. She did receive a flu vaccination this year. Pt denies burning with urination, chest pain, shortness of breath, abdominal pain, vomiting, diarrhea, recent ill contacts, and any other sxs or complaints. PCP: Dani Todd,  
 
Current Facility-Administered Medications Medication Dose Route Frequency Provider Last Rate Last Dose  
 0.9% sodium chloride infusion 500 mL  500 mL IntraVENous CONTINUOUS Philip Gilbert NP   Stopped at 03/30/19 6206 Current Outpatient Medications Medication Sig Dispense Refill  albuterol (PROVENTIL HFA, VENTOLIN HFA, PROAIR HFA) 90 mcg/actuation inhaler Take 2 Puffs by inhalation every four (4) hours as needed for Wheezing. 1 Inhaler 0  
 losartan (COZAAR) 50 mg tablet Take  by mouth daily.  bimatoprost (LUMIGAN) 0.01 % ophthalmic drops Administer 1 Drop to both eyes every evening.  brimonidine-timolol (COMBIGAN) 0.2-0.5 % drop ophthalmic solution 1 Drop every twelve (12) hours.  levothyroxine (SYNTHROID) 100 mcg tablet Take 100 mcg by mouth Daily (before breakfast).  omeprazole (PRILOSEC) 20 mg capsule Take 20 mg by mouth daily.  calcium-cholecalciferol, d3, (CALCIUM 600 + D) 600-125 mg-unit tab Take  by mouth. Past History Past Medical History: 
Past Medical History:  
Diagnosis Date  Cancer ([address]  Diverticulitis  Hypertension  Hypothyroid Past Surgical History: 
Past Surgical History:  
Procedure Laterality Date  HX MASTECTOMY    
 right  HX ORTHOPAEDIC    
 shoulder Family History: 
History reviewed. No pertinent family history. Social History: 
Social History Tobacco Use  Smoking status: Never Smoker  Smokeless tobacco: Never Used Substance Use Topics  Alcohol use: No  
 Drug use: Not on file Allergies: Allergies Allergen Reactions  Levaquin [Levofloxacin] Other (comments) Tremors  Pcn [Penicillins] Rash Review of Systems Review of Systems Constitutional: Positive for chills and fever. HENT: Positive for sore throat. Respiratory: Positive for cough. Negative for shortness of breath. Cardiovascular: Negative for chest pain. Gastrointestinal: Positive for nausea. Negative for abdominal pain, diarrhea and vomiting. Genitourinary: Negative for dysuria. Musculoskeletal: Positive for arthralgias. Negative for neck pain and neck stiffness. Positive body aches Skin: Negative for rash. Neurological: Negative for dizziness. Hematological: Does not bruise/bleed easily. Physical Exam  
 
Vitals:  
 03/30/19 1705 03/30/19 1902 03/30/19 1913 03/30/19 1945 BP: 132/64 138/52 129/57 117/51 Pulse: (!) 104 89 96 Resp: 20 18 Temp: 98.8 °F (37.1 °C) SpO2: 94% 96% 99% 97% Weight: 73 kg (161 lb) Height: 5' 1\" (1.549 m) Physical Exam  
Constitutional: She is oriented to person, place, and time. She appears well-developed and well-nourished. Older lady, nontoxic HENT:  
Head: Normocephalic and atraumatic.   
Right Ear: Hearing normal.  
Left Ear: Hearing normal.  
Ears: 
 
Nose: Nose normal.  
 Mouth/Throat:  
 
 
Eyes: Conjunctivae are normal.  
Neck: Normal range of motion. Neck supple. No rigidity or tenderness to palpation Cardiovascular: Regular rhythm. Mild tachycardia Pulmonary/Chest: Effort normal.  
Scattered rhonchi noted throughout chest positive bronchospasm with deep inhalation Abdominal: Soft. Bowel sounds are normal. There is no tenderness. There is no rebound and no guarding. No CVA TTP Musculoskeletal: Normal range of motion. She exhibits no edema. Neurological: She is alert and oriented to person, place, and time. Skin: Skin is warm and dry. Nursing note and vitals reviewed. Diagnostic Study Results Labs: 
  
Recent Results (from the past 12 hour(s)) EKG, 12 LEAD, INITIAL Collection Time: 03/30/19  5:20 PM  
Result Value Ref Range Ventricular Rate 98 BPM  
 Atrial Rate 98 BPM  
 P-R Interval 186 ms QRS Duration 130 ms  
 Q-T Interval 384 ms QTC Calculation (Bezet) 490 ms Calculated P Axis 53 degrees Calculated R Axis -93 degrees Calculated T Axis 19 degrees Diagnosis Sinus rhythm with marked sinus arrhythmia Right bundle branch block Abnormal ECG When compared with ECG of 27-JUL-2018 13:07, No significant change was found INFLUENZA A & B AG (RAPID TEST) Collection Time: 03/30/19  5:25 PM  
Result Value Ref Range Influenza A Antigen NEGATIVE  NEG Influenza B Antigen NEGATIVE  NEG    
STREP THROAT SCREEN Collection Time: 03/30/19  5:25 PM  
Result Value Ref Range Special Requests: NO SPECIAL REQUESTS Strep Screen NEGATIVE Strep Screen (NOTE) TESTING PERFORMED AT THE [facility] ED ON 3/30/19. Culture result: PENDING   
CBC WITH AUTOMATED DIFF Collection Time: 03/30/19  5:28 PM  
Result Value Ref Range WBC 10.8 4.6 - 13.2 K/uL  
 RBC 3.73 (L) 4.20 - 5.30 M/uL  
 HGB 12.4 12.0 - 16.0 g/dL HCT 35.9 35.0 - 45.0 %  MCV 96.2 74.0 - 97.0 FL  
 MCH 33.2 24.0 - 34.0 PG  
 MCHC 34.5 31.0 - 37.0 g/dL  
 RDW 12.7 11.6 - 14.5 % PLATELET 081 974 - 426 K/uL MPV 9.4 9.2 - 11.8 FL  
 NEUTROPHILS 88 (H) 40 - 73 % LYMPHOCYTES 5 (L) 21 - 52 % MONOCYTES 7 3 - 10 % EOSINOPHILS 0 0 - 5 % BASOPHILS 0 0 - 2 %  
 ABS. NEUTROPHILS 9.5 (H) 1.8 - 8.0 K/UL  
 ABS. LYMPHOCYTES 0.5 (L) 0.9 - 3.6 K/UL  
 ABS. MONOCYTES 0.7 0.05 - 1.2 K/UL  
 ABS. EOSINOPHILS 0.0 0.0 - 0.4 K/UL  
 ABS. BASOPHILS 0.0 0.0 - 0.1 K/UL  
 DF AUTOMATED METABOLIC PANEL, COMPREHENSIVE Collection Time: 03/30/19  5:28 PM  
Result Value Ref Range Sodium 131 (L) 136 - 145 mmol/L Potassium 3.5 3.5 - 5.5 mmol/L Chloride 98 (L) 100 - 108 mmol/L  
 CO2 23 21 - 32 mmol/L Anion gap 10 3.0 - 18 mmol/L Glucose 127 (H) 74 - 99 mg/dL BUN 10 7.0 - 18 MG/DL Creatinine 0.76 0.6 - 1.3 MG/DL  
 BUN/Creatinine ratio 13 12 - 20 GFR est AA >60 >60 ml/min/1.73m2 GFR est non-AA >60 >60 ml/min/1.73m2 Calcium 8.1 (L) 8.5 - 10.1 MG/DL Bilirubin, total 0.5 0.2 - 1.0 MG/DL  
 ALT (SGPT) 19 13 - 56 U/L  
 AST (SGOT) 23 15 - 37 U/L Alk. phosphatase 84 45 - 117 U/L Protein, total 6.0 (L) 6.4 - 8.2 g/dL Albumin 3.4 3.4 - 5.0 g/dL Globulin 2.6 2.0 - 4.0 g/dL A-G Ratio 1.3 0.8 - 1.7 LIPASE Collection Time: 03/30/19  5:28 PM  
Result Value Ref Range Lipase 126 73 - 393 U/L  
CARDIAC PANEL,(CK, CKMB & TROPONIN) Collection Time: 03/30/19  5:28 PM  
Result Value Ref Range CK 34 26 - 192 U/L  
 CK - MB <1.0 <3.6 ng/ml CK-MB Index  0.0 - 4.0 % CALCULATION NOT PERFORMED WHEN RESULT IS BELOW LINEAR LIMIT Troponin-I, QT <0.02 0.0 - 0.045 NG/ML  
URINALYSIS W/ RFLX MICROSCOPIC Collection Time: 03/30/19  6:55 PM  
Result Value Ref Range Color YELLOW Appearance CLEAR Specific gravity 1.018 1.005 - 1.030    
 pH (UA) 5.5 5.0 - 8.0 Protein NEGATIVE  NEG mg/dL Glucose NEGATIVE  NEG mg/dL Ketone 15 (A) NEG mg/dL  Bilirubin NEGATIVE  NEG    
 Blood NEGATIVE  NEG Urobilinogen 0.2 0.2 - 1.0 EU/dL Nitrites NEGATIVE  NEG Leukocyte Esterase NEGATIVE  NEG Radiologic Studies:  
 
5:09 PM 
RADIOLOGY FINDINGS Chest X-ray shows NAP Read by Emiliana Larios FNP-BC Pending review by Radiologist 
 
XR CHEST PA LAT Final Result IMPRESSION:  
  
  
1. No acute radiographic cardiopulmonary abnormality. CT Results  (Last 48 hours) None CXR Results  (Last 48 hours) 03/30/19 1806  XR CHEST PA LAT Final result Impression:  IMPRESSION:  
   
   
1. No acute radiographic cardiopulmonary abnormality. Narrative:  EXAM: XR CHEST PA LAT  
   
CLINICAL INDICATION/HISTORY: Cough, sore throat, generalized body aches  
-Additional: None COMPARISON: None TECHNIQUE: AP and lateral views of the chest  
   
_______________ FINDINGS:  
   
HEART AND MEDIASTINUM: Stable cardiac size and mediastinal contours. Pulmonary  
vascularity is within normal limits. LUNGS AND PLEURAL SPACES: No focal pneumonic consolidation, pneumothorax or  
pleural effusion BONY THORAX AND SOFT TISSUES: No acute osseous abnormality. Partial  
visualization of a left shoulder arthroplasty. Surgical clips project over the  
upper abdomen.  
   
_______________ Medical Decision Making I am the first provider for this patient. I reviewed the vital signs, available nursing notes, past medical history, past surgical history, family history and social history. Vital Signs: Reviewed the patient's vital signs. Pulse Oximetry Analysis: 97% on RA Cardiac Monitor: 
Rate: 96 bpm 
Rhythm: NSR 
 
EKG interpretation: (Preliminary) 5:09 PM  
Normal sinus rhythm, right bundle branch block, ventricular rate 98, QTc 490, no STEMI, unchanged from 7/27/18 EKG read by Emiliana Larios at 9576 Records Reviewed: REVIEWED OLD RECORDS AND NURSING NOTES Procedures: 
Procedures ED Course: 5:09 PM: Initial Contact 6:35 PM Discussed patient's history, exam, and available diagnostics results with Dr. Estuardo Reyes (ED attending) who reviewed labs and if urine is negative recommends discharge and symptomatic treatment 7:37 PM: Patient reports feeling better. She and family updated all results. We will treat symptomatically and as well as give albuterol inhaler. They understand reasons to return and are agreeable treatment plan. Provider Notes (Medical Decision Making): Patient presents emergency department with daughter for acute onset chills, fever, cough, sore throat and generalized body aches. Patient is very well-appearing with no meningeal signs on exam.  She is nontoxic. Labs show a slight left shift but normal white blood cell count and patient is afebrile here in the emergency department. Chest x-ray negative, influenza and strep negative urine shows no signs of infection. Will treat symptomatically with strict return precautions. Patient and daughter do not wish to be treated for flu as her influenza is negative. Her cough improved after nebulizer given will treat with albuterol inhaler at home. Pt understands reasons to return and is offering no questions or concerns. Diagnosis and Disposition Discharge Note: 
7:36 PM: Karlie Munoz  results have been reviewed with her. She has been counseled regarding her diagnosis, treatment, and plan. She verbally conveys understanding and agreement of the signs, symptoms, diagnosis, treatment and prognosis and additionally agrees to follow up as discussed. She also agrees with the care-plan and conveys that all of her questions have been answered. I have also provided discharge instructions for her that include: educational information regarding their diagnosis and treatment, and list of reasons why they would want to return to the ED prior to their follow-up appointment, should her condition change.  She has been provided with education for proper emergency department utilization. Clinical Impression: 1. Hyponatremia 2. Hypocalcemia 3. Viral illness 4. Body aches 5. Fever, unspecified fever cause Plan: 
1. D/C Home 2. Discharge Medication List as of 3/30/2019  7:40 PM  
  
START taking these medications Details  
albuterol (PROVENTIL HFA, VENTOLIN HFA, PROAIR HFA) 90 mcg/actuation inhaler Take 2 Puffs by inhalation every four (4) hours as needed for Wheezing., Normal, Disp-1 Inhaler, R-0  
  
  
CONTINUE these medications which have NOT CHANGED Details  
losartan (COZAAR) 50 mg tablet Take  by mouth daily. , Historical Med  
  
bimatoprost (LUMIGAN) 0.01 % ophthalmic drops Administer 1 Drop to both eyes every evening., Historical Med  
  
brimonidine-timolol (COMBIGAN) 0.2-0.5 % drop ophthalmic solution 1 Drop every twelve (12) hours. , Historical Med  
  
levothyroxine (SYNTHROID) 100 mcg tablet Take 100 mcg by mouth Daily (before breakfast). , Historical Med  
  
omeprazole (PRILOSEC) 20 mg capsule Take 20 mg by mouth daily. , Historical Med  
  
calcium-cholecalciferol, d3, (CALCIUM 600 + D) 600-125 mg-unit tab Take  by mouth., Historical Med 3. Follow-up Information Follow up With Specialties Details Why Contact Tabatha Peres,  Internal Medicine Schedule an appointment as soon as possible for a visit in 1 day  [address] [place] [address] 
[phone] [facility] EMERGENCY DEPT Emergency Medicine  As needed, If symptoms worsen [address] [id number] [phone] Please note that this dictation was completed with [employer] the [employer] voice recognition software. Quite often unanticipated grammatical, syntax, homophones, and other interpretive errors are inadvertently transcribed by the computer software. Please disregard these errors. Please excuse any errors that have escaped final proofreading. Janusz Rascon, FNP-BC

## 2019-03-30 NOTE — DISCHARGE INSTRUCTIONS
Follow-up as directed  Use inhaler as directed every 4-6 hours as needed for cough bronchospasm or chest tightness  Return to the emergency department for neck stiffness, continued fever, increased body aches, chest pain, shortness of breath, abdominal pain, burning with urination or worsening of symptoms  Increase oral fluid intake  Take Tylenol as needed for pain and fever    Patient Education        Learning About Fever  What is a fever? A fever is a high body temperature. It's one way your body fights being sick. A fever shows that the body is responding to infection or other illnesses, both minor and severe. A fever is a symptom, not an illness by itself. A fever can be a sign that you are ill, but most fevers are not caused by a serious problem. You may have a fever with a minor illness, such as a cold. But sometimes a very serious infection may cause little or no fever. It is important to look at other symptoms, other conditions you have, and how you feel in general. In children, notice how they act and see what symptoms they complain of. What is a normal body temperature? A normal body temperature is about 98. 6ºF. Some people have a normal temperature that is a little higher or a little lower than this. Your temperature may be a little lower in the morning than it is later in the day. It may go up during hot weather or when you exercise, wear heavy clothes, or take a hot bath. Your temperature may also be different depending on how you take it. A temperature taken in the mouth (oral) or under the arm may be a little lower than your core temperature (rectal). What is a fever temperature? A core temperature of 100.4°F or above is considered a fever. What can cause a fever? A fever may be caused by:  · Infections. This is the most common cause of a fever. Examples of infections that can cause a fever include the flu, a kidney infection, or pneumonia. · Some medicines.   · Severe trauma or injury, such as a heart attack, stroke, heatstroke, or burns. · Other medical conditions, such as arthritis and some cancers. How can you treat a fever at home? · Ask your doctor if you can take an over-the-counter pain medicine, such as acetaminophen (Tylenol), ibuprofen (Advil, Motrin), or naproxen (Aleve). Be safe with medicines. Read and follow all instructions on the label. · To prevent dehydration, drink plenty of fluids. Choose water and other caffeine-free clear liquids until you feel better. If you have kidney, heart, or liver disease and have to limit fluids, talk with your doctor before you increase the amount of fluids you drink. Follow-up care is a key part of your treatment and safety. Be sure to make and go to all appointments, and call your doctor if you are having problems. It's also a good idea to know your test results and keep a list of the medicines you take. Where can you learn more? Go to http://wesley-joe.info/. Enter M723 in the search box to learn more about \"Learning About Fever. \"  Current as of: September 23, 2018  Content Version: 11.9  © 4666-0952 [employer]. Care instructions adapted under license by [employer] (which disclaims liability or warranty for this information). If you have questions about a medical condition or this instruction, always ask your healthcare professional. [address] any warranty or liability for your use of this information.

## 2019-04-01 LAB
B-HEM STREP THROAT QL CULT: NEGATIVE
B-HEM STREP THROAT QL CULT: NORMAL
BACTERIA SPEC CULT: NORMAL
SERVICE CMNT-IMP: NORMAL

## 2019-05-14 LAB
ATRIAL RATE: 98 BPM
CALCULATED P AXIS, ECG09: 53 DEGREES
CALCULATED R AXIS, ECG10: -93 DEGREES
CALCULATED T AXIS, ECG11: 19 DEGREES
DIAGNOSIS, 93000: NORMAL
P-R INTERVAL, ECG05: 186 MS
Q-T INTERVAL, ECG07: 384 MS
QRS DURATION, ECG06: 130 MS
QTC CALCULATION (BEZET), ECG08: 490 MS
VENTRICULAR RATE, ECG03: 98 BPM

## 2020-03-02 ENCOUNTER — HOSPITAL ENCOUNTER (EMERGENCY)
Age: 85
Discharge: HOME | End: 2020-03-02
Attending: EMERGENCY MEDICINE
Payer: MEDICARE

## 2020-03-02 ENCOUNTER — APPOINTMENT (OUTPATIENT)
Dept: GENERAL RADIOLOGY | Age: 85
End: 2020-03-02
Attending: EMERGENCY MEDICINE
Payer: MEDICARE

## 2020-03-02 ENCOUNTER — HOSPITAL ENCOUNTER (EMERGENCY)
Age: 85
Discharge: HOME | End: 2020-03-03
Attending: EMERGENCY MEDICINE
Payer: MEDICARE

## 2020-03-02 ENCOUNTER — APPOINTMENT (OUTPATIENT)
Dept: CT IMAGING | Age: 85
End: 2020-03-02
Attending: EMERGENCY MEDICINE
Payer: MEDICARE

## 2020-03-02 VITALS
TEMPERATURE: 98 F | RESPIRATION RATE: 18 BRPM | OXYGEN SATURATION: 96 % | HEART RATE: 68 BPM | WEIGHT: 165 LBS | DIASTOLIC BLOOD PRESSURE: 53 MMHG | BODY MASS INDEX: 31.18 KG/M2 | SYSTOLIC BLOOD PRESSURE: 123 MMHG

## 2020-03-02 DIAGNOSIS — E87.1 HYPONATREMIA: ICD-10-CM

## 2020-03-02 DIAGNOSIS — R55 NEAR SYNCOPE: Primary | ICD-10-CM

## 2020-03-02 DIAGNOSIS — I10 ESSENTIAL HYPERTENSION: Primary | ICD-10-CM

## 2020-03-02 DIAGNOSIS — I95.9 HYPOTENSION, UNSPECIFIED HYPOTENSION TYPE: ICD-10-CM

## 2020-03-02 LAB
ALBUMIN SERPL-MCNC: 3.4 G/DL (ref 3.4–5)
ALBUMIN SERPL-MCNC: 3.6 G/DL (ref 3.4–5)
ALBUMIN/GLOB SERPL: 1.3 {RATIO} (ref 0.8–1.7)
ALBUMIN/GLOB SERPL: 1.4 {RATIO} (ref 0.8–1.7)
ALP SERPL-CCNC: 67 U/L (ref 45–117)
ALP SERPL-CCNC: 74 U/L (ref 45–117)
ALT SERPL-CCNC: 20 U/L (ref 13–56)
ALT SERPL-CCNC: 20 U/L (ref 13–56)
ANION GAP SERPL CALC-SCNC: 12 MMOL/L (ref 3–18)
ANION GAP SERPL CALC-SCNC: 5 MMOL/L (ref 3–18)
AST SERPL-CCNC: 20 U/L (ref 10–38)
AST SERPL-CCNC: 22 U/L (ref 10–38)
BASOPHILS # BLD: 0 K/UL (ref 0–0.1)
BASOPHILS # BLD: 0 K/UL (ref 0–0.1)
BASOPHILS NFR BLD: 0 % (ref 0–2)
BASOPHILS NFR BLD: 0 % (ref 0–2)
BILIRUB SERPL-MCNC: 0.4 MG/DL (ref 0.2–1)
BILIRUB SERPL-MCNC: 0.5 MG/DL (ref 0.2–1)
BUN SERPL-MCNC: 17 MG/DL (ref 7–18)
BUN SERPL-MCNC: 22 MG/DL (ref 7–18)
BUN/CREAT SERPL: 24 (ref 12–20)
BUN/CREAT SERPL: 25 (ref 12–20)
CALCIUM SERPL-MCNC: 8.6 MG/DL (ref 8.5–10.1)
CALCIUM SERPL-MCNC: 8.6 MG/DL (ref 8.5–10.1)
CHLORIDE SERPL-SCNC: 90 MMOL/L (ref 100–111)
CHLORIDE SERPL-SCNC: 94 MMOL/L (ref 100–111)
CK MB CFR SERPL CALC: 3.3 % (ref 0–4)
CK MB SERPL-MCNC: 1.6 NG/ML (ref 5–25)
CK SERPL-CCNC: 48 U/L (ref 26–192)
CO2 SERPL-SCNC: 25 MMOL/L (ref 21–32)
CO2 SERPL-SCNC: 30 MMOL/L (ref 21–32)
CREAT SERPL-MCNC: 0.69 MG/DL (ref 0.6–1.3)
CREAT SERPL-MCNC: 0.93 MG/DL (ref 0.6–1.3)
DIFFERENTIAL METHOD BLD: ABNORMAL
DIFFERENTIAL METHOD BLD: ABNORMAL
EOSINOPHIL # BLD: 0.2 K/UL (ref 0–0.4)
EOSINOPHIL # BLD: 0.2 K/UL (ref 0–0.4)
EOSINOPHIL NFR BLD: 2 % (ref 0–5)
EOSINOPHIL NFR BLD: 3 % (ref 0–5)
ERYTHROCYTE [DISTWIDTH] IN BLOOD BY AUTOMATED COUNT: 12.2 % (ref 11.6–14.5)
ERYTHROCYTE [DISTWIDTH] IN BLOOD BY AUTOMATED COUNT: 12.3 % (ref 11.6–14.5)
GLOBULIN SER CALC-MCNC: 2.5 G/DL (ref 2–4)
GLOBULIN SER CALC-MCNC: 2.8 G/DL (ref 2–4)
GLUCOSE BLD STRIP.AUTO-MCNC: 98 MG/DL (ref 70–110)
GLUCOSE SERPL-MCNC: 102 MG/DL (ref 74–99)
GLUCOSE SERPL-MCNC: 97 MG/DL (ref 74–99)
HCT VFR BLD AUTO: 31.8 % (ref 35–45)
HCT VFR BLD AUTO: 35.8 % (ref 35–45)
HGB BLD-MCNC: 10.8 G/DL (ref 12–16)
HGB BLD-MCNC: 12.2 G/DL (ref 12–16)
LYMPHOCYTES # BLD: 1 K/UL (ref 0.9–3.6)
LYMPHOCYTES # BLD: 1.4 K/UL (ref 0.9–3.6)
LYMPHOCYTES NFR BLD: 17 % (ref 21–52)
LYMPHOCYTES NFR BLD: 18 % (ref 21–52)
MCH RBC QN AUTO: 32.6 PG (ref 24–34)
MCH RBC QN AUTO: 32.9 PG (ref 24–34)
MCHC RBC AUTO-ENTMCNC: 34 G/DL (ref 31–37)
MCHC RBC AUTO-ENTMCNC: 34.1 G/DL (ref 31–37)
MCV RBC AUTO: 96.1 FL (ref 74–97)
MCV RBC AUTO: 96.5 FL (ref 74–97)
MONOCYTES # BLD: 0.6 K/UL (ref 0.05–1.2)
MONOCYTES # BLD: 0.8 K/UL (ref 0.05–1.2)
MONOCYTES NFR BLD: 10 % (ref 3–10)
MONOCYTES NFR BLD: 11 % (ref 3–10)
NEUTS SEG # BLD: 3.8 K/UL (ref 1.8–8)
NEUTS SEG # BLD: 5.8 K/UL (ref 1.8–8)
NEUTS SEG NFR BLD: 68 % (ref 40–73)
NEUTS SEG NFR BLD: 71 % (ref 40–73)
PLATELET # BLD AUTO: 266 K/UL (ref 135–420)
PLATELET # BLD AUTO: 293 K/UL (ref 135–420)
PMV BLD AUTO: 9.6 FL (ref 9.2–11.8)
PMV BLD AUTO: 9.7 FL (ref 9.2–11.8)
POTASSIUM SERPL-SCNC: 3.6 MMOL/L (ref 3.5–5.5)
POTASSIUM SERPL-SCNC: 3.8 MMOL/L (ref 3.5–5.5)
PROT SERPL-MCNC: 5.9 G/DL (ref 6.4–8.2)
PROT SERPL-MCNC: 6.4 G/DL (ref 6.4–8.2)
RBC # BLD AUTO: 3.31 M/UL (ref 4.2–5.3)
RBC # BLD AUTO: 3.71 M/UL (ref 4.2–5.3)
SODIUM SERPL-SCNC: 125 MMOL/L (ref 136–145)
SODIUM SERPL-SCNC: 131 MMOL/L (ref 136–145)
TROPONIN I SERPL-MCNC: <0.02 NG/ML (ref 0–0.04)
WBC # BLD AUTO: 5.6 K/UL (ref 4.6–13.2)
WBC # BLD AUTO: 8.1 K/UL (ref 4.6–13.2)

## 2020-03-02 PROCEDURE — 71045 X-RAY EXAM CHEST 1 VIEW: CPT

## 2020-03-02 PROCEDURE — 74011250636 HC RX REV CODE- 250/636: Performed by: EMERGENCY MEDICINE

## 2020-03-02 PROCEDURE — 80053 COMPREHEN METABOLIC PANEL: CPT

## 2020-03-02 PROCEDURE — 93005 ELECTROCARDIOGRAM TRACING: CPT

## 2020-03-02 PROCEDURE — 82962 GLUCOSE BLOOD TEST: CPT

## 2020-03-02 PROCEDURE — 99285 EMERGENCY DEPT VISIT HI MDM: CPT

## 2020-03-02 PROCEDURE — 94762 N-INVAS EAR/PLS OXIMTRY CONT: CPT

## 2020-03-02 PROCEDURE — 70450 CT HEAD/BRAIN W/O DYE: CPT

## 2020-03-02 PROCEDURE — 82550 ASSAY OF CK (CPK): CPT

## 2020-03-02 PROCEDURE — 85025 COMPLETE CBC W/AUTO DIFF WBC: CPT

## 2020-03-02 PROCEDURE — 74011250637 HC RX REV CODE- 250/637: Performed by: EMERGENCY MEDICINE

## 2020-03-02 RX ORDER — SERTRALINE HYDROCHLORIDE 50 MG/1
50 TABLET, FILM COATED ORAL DAILY
COMMUNITY

## 2020-03-02 RX ORDER — VALSARTAN AND HYDROCHLOROTHIAZIDE 160; 25 MG/1; MG/1
1 TABLET ORAL DAILY
COMMUNITY

## 2020-03-02 RX ORDER — CLONIDINE HYDROCHLORIDE 0.1 MG/1
0.1 TABLET ORAL
Status: COMPLETED | OUTPATIENT
Start: 2020-03-02 | End: 2020-03-02

## 2020-03-02 RX ADMIN — SODIUM CHLORIDE 500 ML: 900 INJECTION, SOLUTION INTRAVENOUS at 23:44

## 2020-03-02 RX ADMIN — CLONIDINE HYDROCHLORIDE 0.1 MG: 0.1 TABLET ORAL at 17:39

## 2020-03-02 NOTE — ED NOTES
amb to bathroom with touch assist of 2 dgt's.  Pt states that her dgts are worried because last time she was in hospital she slipped and fell

## 2020-03-02 NOTE — ED PROVIDER NOTES
EMERGENCY DEPARTMENT HISTORY AND PHYSICAL EXAM    Date: 3/2/2020  Patient Name: Camille Reyes    History of Presenting Illness     Chief Complaint   Patient presents with    Hypertension          History Provided By: Patient    Additional History (Context):   Camille Reyes is a 80 y.o. female with PMHX hypertension presents to the emergency department C/O blurry vision and elevated blood pressure. Patient's daughters reports that the patient was seen by her PCP and had her valsartan increased. Patient was also started on Zoloft 50 mg for anxiety. Pt denies headache, chest pain, shortness of breath, abdominal pain, vomiting, and any other sxs or complaints. Patient's daughter is concerned that Zoloft may be causing her elevated blood pressure. PCP: Shelly Marr, DO    Current Outpatient Medications   Medication Sig Dispense Refill    valsartan-hydroCHLOROthiazide (DIOVAN-HCT) 160-25 mg per tablet Take 1 Tab by mouth daily.  sertraline (ZOLOFT) 50 mg tablet Take 50 mg by mouth daily.  bimatoprost (LUMIGAN) 0.01 % ophthalmic drops Administer 1 Drop to both eyes every evening.  brimonidine-timolol (COMBIGAN) 0.2-0.5 % drop ophthalmic solution 1 Drop every twelve (12) hours.  levothyroxine (SYNTHROID) 100 mcg tablet Take 100 mcg by mouth Daily (before breakfast).  omeprazole (PRILOSEC) 20 mg capsule Take 20 mg by mouth daily.  calcium-cholecalciferol, d3, (CALCIUM 600 + D) 600-125 mg-unit tab Take  by mouth. Past History     Past Medical History:  Past Medical History:   Diagnosis Date    Cancer ([facility] [address]     Diverticulitis     Hypertension     Hypothyroid        Past Surgical History:  Past Surgical History:   Procedure Laterality Date    HX MASTECTOMY      right    HX ORTHOPAEDIC      shoulder       Family History:  No family history on file.     Social History:  Social History     Tobacco Use    Smoking status: Never Smoker    Smokeless tobacco: Never Used   Substance Use Topics    Alcohol use: No    Drug use: Not on file       Allergies: Allergies   Allergen Reactions    Levaquin [Levofloxacin] Other (comments)     Tremors       Pcn [Penicillins] Rash         Review of Systems   Review of Systems   Constitutional: Negative for chills and fever. HENT: Negative for congestion, ear pain, sinus pain and sore throat. Eyes: Positive for visual disturbance. Negative for pain. Respiratory: Negative for cough and shortness of breath. Cardiovascular: Negative for chest pain and leg swelling. Gastrointestinal: Positive for nausea. Negative for abdominal pain, constipation, diarrhea and vomiting. Genitourinary: Negative for dysuria, hematuria, vaginal bleeding and vaginal discharge. Musculoskeletal: Negative for back pain and neck pain. Skin: Negative for rash and wound. Neurological: Negative for dizziness, tremors, weakness, light-headedness and numbness. All other systems reviewed and are negative. Physical Exam     Vitals:    03/02/20 1700 03/02/20 1730 03/02/20 1739 03/02/20 1826   BP: 154/57 156/66 156/66 152/58   Pulse: 69 68 65 68   Resp: 20 21  18   Temp:       SpO2: 97% 100%  97%   Weight:         Physical Exam    Nursing note and vitals reviewed    Constitutional: Elderly  female, no acute distress  Head: Normocephalic, Atraumatic  Eyes: Pupils are equal, round, and reactive to light, EOMI  Neck: Supple, non-tender  Cardiovascular: Regular rate and rhythm, no murmurs, rubs, or gallops  Chest: Normal work of breathing and chest excursion bilaterally  Lungs: Clear to ausculation bilaterally, no wheezes, no rhonchi  Abdomen: Soft, non tender, non distended, normoactive bowel sounds  Back: No evidence of trauma or deformity  Extremities: No evidence of trauma or deformity, no LE edema.  No streaking erythema, vesicular lesions, ulcerations or bulla  Skin: Warm and dry, normal cap refill  Neuro: Alert and appropriate, CN intact, normal speech, moving all 4 extremities freely and symmetrically, negative Romberg  Psychiatric: Normal mood and affect       Diagnostic Study Results     Labs -     Recent Results (from the past 12 hour(s))   EKG, 12 LEAD, INITIAL    Collection Time: 03/02/20  4:34 PM   Result Value Ref Range    Ventricular Rate 77 BPM    Atrial Rate 77 BPM    P-R Interval 212 ms    QRS Duration 136 ms    Q-T Interval 422 ms    QTC Calculation (Bezet) 477 ms    Calculated P Axis 77 degrees    Calculated R Axis -77 degrees    Calculated T Axis 10 degrees    Diagnosis       Sinus rhythm with 1st degree AV block  Left axis deviation  Right bundle branch block  Abnormal ECG  When compared with ECG of 30-MAR-2019 17:20,  No significant change was found     CBC WITH AUTOMATED DIFF    Collection Time: 03/02/20  4:40 PM   Result Value Ref Range    WBC 5.6 4.6 - 13.2 K/uL    RBC 3.71 (L) 4.20 - 5.30 M/uL    HGB 12.2 12.0 - 16.0 g/dL    HCT 35.8 35.0 - 45.0 %    MCV 96.5 74.0 - 97.0 FL    MCH 32.9 24.0 - 34.0 PG    MCHC 34.1 31.0 - 37.0 g/dL    RDW 12.3 11.6 - 14.5 %    PLATELET 932 491 - 250 K/uL    MPV 9.6 9.2 - 11.8 FL    NEUTROPHILS 68 40 - 73 %    LYMPHOCYTES 18 (L) 21 - 52 %    MONOCYTES 11 (H) 3 - 10 %    EOSINOPHILS 3 0 - 5 %    BASOPHILS 0 0 - 2 %    ABS. NEUTROPHILS 3.8 1.8 - 8.0 K/UL    ABS. LYMPHOCYTES 1.0 0.9 - 3.6 K/UL    ABS. MONOCYTES 0.6 0.05 - 1.2 K/UL    ABS. EOSINOPHILS 0.2 0.0 - 0.4 K/UL    ABS.  BASOPHILS 0.0 0.0 - 0.1 K/UL    DF AUTOMATED     CARDIAC PANEL,(CK, CKMB & TROPONIN)    Collection Time: 03/02/20  4:40 PM   Result Value Ref Range    CK 48 26 - 192 U/L    CK - MB 1.6 <3.6 ng/ml    CK-MB Index 3.3 0.0 - 4.0 %    Troponin-I, QT <0.02 0.0 - 1.637 NG/ML   METABOLIC PANEL, COMPREHENSIVE    Collection Time: 03/02/20  4:40 PM   Result Value Ref Range    Sodium 125 (L) 136 - 145 mmol/L    Potassium 3.8 3.5 - 5.5 mmol/L    Chloride 90 (L) 100 - 111 mmol/L    CO2 30 21 - 32 mmol/L    Anion gap 5 3.0 - 18 mmol/L Glucose 97 74 - 99 mg/dL    BUN 17 7.0 - 18 MG/DL    Creatinine 0.69 0.6 - 1.3 MG/DL    BUN/Creatinine ratio 25 (H) 12 - 20      GFR est AA >60 >60 ml/min/1.73m2    GFR est non-AA >60 >60 ml/min/1.73m2    Calcium 8.6 8.5 - 10.1 MG/DL    Bilirubin, total 0.5 0.2 - 1.0 MG/DL    ALT (SGPT) 20 13 - 56 U/L    AST (SGOT) 22 10 - 38 U/L    Alk. phosphatase 74 45 - 117 U/L    Protein, total 6.4 6.4 - 8.2 g/dL    Albumin 3.6 3.4 - 5.0 g/dL    Globulin 2.8 2.0 - 4.0 g/dL    A-G Ratio 1.3 0.8 - 1.7         Radiologic Studies -   CT HEAD WO CONT   Final Result   IMPRESSION:      No acute intracranial abnormalities. CT Results  (Last 48 hours)               03/02/20 1844  CT HEAD WO CONT Final result    Impression:  IMPRESSION:       No acute intracranial abnormalities. Narrative:  EXAM: CT head       INDICATION: Hypertension and blurry vision       COMPARISON: None. TECHNIQUE: Axial CT imaging of the head was performed without intravenous   contrast. One or more dose reduction techniques were used on this CT: automated   exposure control, adjustment of the mAs and/or kVp according to patient size,   and iterative reconstruction techniques. The specific techniques used on this   CT exam have been documented in the patient's electronic medical record. Digital   Imaging and Communications in Medicine (DICOM) format image data are available   to nonaffiliated external healthcare facilities or entities on a secure, media   free, reciprocally searchable basis with patient authorization for at least a   12-month period after this study. _______________       FINDINGS:       BRAIN AND POSTERIOR FOSSA: The sulci, folia, ventricles and basal cisterns are   within normal limits for the patient's age. There is no intracranial hemorrhage,   mass effect, or midline shift. There is moderate small vessel ischemic disease.    Is the       EXTRA-AXIAL SPACES AND MENINGES: There are no abnormal extra-axial fluid collections. CALVARIUM: Intact. SINUSES: Clear. OTHER: None.       _______________               CXR Results  (Last 48 hours)    None            Medical Decision Making   I am the first provider for this patient. I reviewed the vital signs, available nursing notes, past medical history, past surgical history, family history and social history. Vital Signs-Reviewed the patient's vital signs. Pulse Oximetry Analysis -98 % on room air    Cardiac Monitor:  Rate: 82 bpm  Rhythm: Regular    EKG interpretation: (Preliminary)  4:44 PM   Sinus rhythm with first-degree AV block at 77 beats minute. Right bundle branch block. No acute ST elevation    Records Reviewed: Nursing Notes and Old Medical Records    Provider Notes:   80 y.o. female with a history of hypertension presenting with complaint of high blood pressure. On exam patient is afebrile and not tachycardic. Noted to have elevated blood pressure at 184/74 however not appearing toxic or acutely ill. She has no focal neurological deficits however complaining of blurry vision. Will obtain CT scan of her head to evaluate for hypertensive bleed. Will obtain labs to eval for endorgan damage. Will provide clonidine for blood pressure control. Patient's daughters questioning of Zoloft may be causing her elevated blood pressures. Explained to her family that this could be a known side effect however they should discuss this with their primary care doctor before discontinuing the Zoloft. Procedures:  Procedures    ED Course:   4:40 PM Initial assessment performed. The patients presenting problems have been discussed, and they are in agreement with the care plan formulated and outlined with them. I have encouraged them to ask questions as they arise throughout their visit. 7:24 PM  Patient with no endorgan damage with a creatinine 0.69 cardiac enzymes negative. Mild hyponatremia and hypochloremia. CT scan showing no acute process. After receiving clonidine, patient's blood pressure has improved. Patient urged close PCP follow-up         Diagnosis and Disposition       DISCHARGE NOTE:  7:21 PM    Erinn Brown's  results have been reviewed with her. She has been counseled regarding her diagnosis, treatment, and plan. She verbally conveys understanding and agreement of the signs, symptoms, diagnosis, treatment and prognosis and additionally agrees to follow up as discussed. She also agrees with the care-plan and conveys that all of her questions have been answered. I have also provided discharge instructions for her that include: educational information regarding their diagnosis and treatment, and list of reasons why they would want to return to the ED prior to their follow-up appointment, should her condition change. She has been provided with education for proper emergency department utilization. CLINICAL IMPRESSION:    1. Essential hypertension    2. Hyponatremia        PLAN:  1. D/C Home  2. Current Discharge Medication List        3. Follow-up Information     Follow up With Specialties Details Why Tarsha Linares, DO Internal Medicine Schedule an appointment as soon as possible for a visit in 2 days  [address] [facility]      [facility] EMERGENCY DEPT Emergency Medicine  As needed if symptoms worsen [address] [facility] [address]  [phone]        ____________________________________     Please note that this dictation was completed with [employer], the computer voice recognition software. Quite often unanticipated grammatical, syntax, homophones, and other interpretive errors are inadvertently transcribed by the computer software. Please disregard these errors. Please excuse any errors that have escaped final proofreading.

## 2020-03-03 VITALS
RESPIRATION RATE: 19 BRPM | BODY MASS INDEX: 31.16 KG/M2 | WEIGHT: 164.9 LBS | SYSTOLIC BLOOD PRESSURE: 129 MMHG | OXYGEN SATURATION: 99 % | DIASTOLIC BLOOD PRESSURE: 59 MMHG | TEMPERATURE: 98.2 F | HEART RATE: 67 BPM

## 2020-03-03 LAB
CK MB CFR SERPL CALC: 3 % (ref 0–4)
CK MB SERPL-MCNC: 1.6 NG/ML (ref 5–25)
CK SERPL-CCNC: 53 U/L (ref 26–192)
TROPONIN I SERPL-MCNC: 0.02 NG/ML (ref 0–0.04)

## 2020-03-03 NOTE — ED TRIAGE NOTES
Arrives via ems. Pt was seen at this facility earlier today for symptoms of htn. Pt was dc to home and once home became hypotensive and began to fall. Pt family member guided pt to floor. Family states that the pt did not hit her head.

## 2020-03-03 NOTE — ED PROVIDER NOTES
EMERGENCY DEPARTMENT HISTORY AND PHYSICAL EXAM    Date: 3/2/2020  Patient Name: Alannah Chun    History of Presenting Illness     Chief Complaint   Patient presents with    Fall         History Provided By: Patient, Patient's Daughter and EMS    11:14 PM  Alannah Chun is a 80 y.o. female with PMHX of anxiety, thyroid disease, hypertension who presents to the emergency department C/O near syncopal episode. Per patient's daughter they were walking her back to her room when she suddenly fell to the ground. Daughter was able to lower her to the ground but she did not strike her head or injure herself. She did not lose consciousness completely but seemed very sleepy and reported she felt nauseous and woozy they took her blood pressure and found it to be low and called 911 who also found her low on arrival patient was seen in the emergency department earlier today for hypertension and given an extra dose of clonidine in the emergency department around 5:30 PM.  Patient denies any complaints at this time including any headache, chest pain, shortness of breath, numbness or weakness. PCP: Emanuel Olivares, DO    Current Outpatient Medications   Medication Sig Dispense Refill    valsartan-hydroCHLOROthiazide (DIOVAN-HCT) 160-25 mg per tablet Take 1 Tab by mouth daily.  sertraline (ZOLOFT) 50 mg tablet Take 50 mg by mouth daily.  bimatoprost (LUMIGAN) 0.01 % ophthalmic drops Administer 1 Drop to both eyes every evening.  brimonidine-timolol (COMBIGAN) 0.2-0.5 % drop ophthalmic solution 1 Drop every twelve (12) hours.  levothyroxine (SYNTHROID) 100 mcg tablet Take 100 mcg by mouth Daily (before breakfast).  omeprazole (PRILOSEC) 20 mg capsule Take 20 mg by mouth daily.  calcium-cholecalciferol, d3, (CALCIUM 600 + D) 600-125 mg-unit tab Take  by mouth.          Past History     Past Medical History:  Past Medical History:   Diagnosis Date    Cancer ([facility] [address]     Diverticulitis  Hypertension     Hypothyroid        Past Surgical History:  Past Surgical History:   Procedure Laterality Date    HX MASTECTOMY      right    HX ORTHOPAEDIC      shoulder       Family History:  History reviewed. No pertinent family history. Social History:  Social History     Tobacco Use    Smoking status: Never Smoker    Smokeless tobacco: Never Used   Substance Use Topics    Alcohol use: No    Drug use: Not on file       Allergies: Allergies   Allergen Reactions    Levaquin [Levofloxacin] Other (comments)     Tremors       Pcn [Penicillins] Rash         Review of Systems   Review of Systems   Constitutional: Negative for fever. Respiratory: Negative for shortness of breath. Cardiovascular: Negative for chest pain. Gastrointestinal: Positive for nausea. Negative for abdominal pain. Neurological: Positive for light-headedness. Negative for syncope. All other systems reviewed and are negative.         Physical Exam     Vitals:    03/02/20 2315 03/02/20 2329 03/02/20 2331 03/02/20 2335   BP: 131/56 133/53 122/51 119/42   Pulse: 61 62 62 77   Resp: 16 19 21 20   Temp: 98.2 °F (36.8 °C)      SpO2: 100% 100% 100% 100%   Weight: 74.8 kg (164 lb 14.5 oz)        Physical Exam    Nursing notes and vital signs reviewed    Constitutional: Non toxic appearing, elderly, no acute distress  Head: Normocephalic, Atraumatic  Eyes: Pupils are equal, round, and reactive to light, EOMI  Neck: Supple  Cardiovascular: Regular rate and rhythm, no murmurs, rubs, or gallops  Chest: Normal work of breathing and chest excursion bilaterally  Lungs: Clear to ausculation bilaterally  Abdomen: Soft, non tender, non distended  Back: No evidence of trauma or deformity  Extremities: No evidence of trauma or deformity, no LE edema  Skin: Warm and dry, normal cap refill  Neuro: Alert and appropriate, CN intact, normal speech, strength and sensation symmetric bilaterally, normal coordination  Psychiatric: Normal mood and affect      Diagnostic Study Results     Labs -     Recent Results (from the past 12 hour(s))   EKG, 12 LEAD, INITIAL    Collection Time: 03/02/20  4:34 PM   Result Value Ref Range    Ventricular Rate 77 BPM    Atrial Rate 77 BPM    P-R Interval 212 ms    QRS Duration 136 ms    Q-T Interval 422 ms    QTC Calculation (Bezet) 477 ms    Calculated P Axis 77 degrees    Calculated R Axis -77 degrees    Calculated T Axis 10 degrees    Diagnosis       Sinus rhythm with 1st degree AV block  Left axis deviation  Right bundle branch block  Abnormal ECG  When compared with ECG of 30-MAR-2019 17:20,  No significant change was found     CBC WITH AUTOMATED DIFF    Collection Time: 03/02/20  4:40 PM   Result Value Ref Range    WBC 5.6 4.6 - 13.2 K/uL    RBC 3.71 (L) 4.20 - 5.30 M/uL    HGB 12.2 12.0 - 16.0 g/dL    HCT 35.8 35.0 - 45.0 %    MCV 96.5 74.0 - 97.0 FL    MCH 32.9 24.0 - 34.0 PG    MCHC 34.1 31.0 - 37.0 g/dL    RDW 12.3 11.6 - 14.5 %    PLATELET 384 674 - 134 K/uL    MPV 9.6 9.2 - 11.8 FL    NEUTROPHILS 68 40 - 73 %    LYMPHOCYTES 18 (L) 21 - 52 %    MONOCYTES 11 (H) 3 - 10 %    EOSINOPHILS 3 0 - 5 %    BASOPHILS 0 0 - 2 %    ABS. NEUTROPHILS 3.8 1.8 - 8.0 K/UL    ABS. LYMPHOCYTES 1.0 0.9 - 3.6 K/UL    ABS. MONOCYTES 0.6 0.05 - 1.2 K/UL    ABS. EOSINOPHILS 0.2 0.0 - 0.4 K/UL    ABS.  BASOPHILS 0.0 0.0 - 0.1 K/UL    DF AUTOMATED     CARDIAC PANEL,(CK, CKMB & TROPONIN)    Collection Time: 03/02/20  4:40 PM   Result Value Ref Range    CK 48 26 - 192 U/L    CK - MB 1.6 <3.6 ng/ml    CK-MB Index 3.3 0.0 - 4.0 %    Troponin-I, QT <0.02 0.0 - 4.021 NG/ML   METABOLIC PANEL, COMPREHENSIVE    Collection Time: 03/02/20  4:40 PM   Result Value Ref Range    Sodium 125 (L) 136 - 145 mmol/L    Potassium 3.8 3.5 - 5.5 mmol/L    Chloride 90 (L) 100 - 111 mmol/L    CO2 30 21 - 32 mmol/L    Anion gap 5 3.0 - 18 mmol/L    Glucose 97 74 - 99 mg/dL    BUN 17 7.0 - 18 MG/DL    Creatinine 0.69 0.6 - 1.3 MG/DL    BUN/Creatinine ratio 25 (H) 12 - 20      GFR est AA >60 >60 ml/min/1.73m2    GFR est non-AA >60 >60 ml/min/1.73m2    Calcium 8.6 8.5 - 10.1 MG/DL    Bilirubin, total 0.5 0.2 - 1.0 MG/DL    ALT (SGPT) 20 13 - 56 U/L    AST (SGOT) 22 10 - 38 U/L    Alk. phosphatase 74 45 - 117 U/L    Protein, total 6.4 6.4 - 8.2 g/dL    Albumin 3.6 3.4 - 5.0 g/dL    Globulin 2.8 2.0 - 4.0 g/dL    A-G Ratio 1.3 0.8 - 1.7     EKG, 12 LEAD, INITIAL    Collection Time: 03/02/20 11:18 PM   Result Value Ref Range    Ventricular Rate 61 BPM    Atrial Rate 61 BPM    P-R Interval 202 ms    QRS Duration 130 ms    Q-T Interval 470 ms    QTC Calculation (Bezet) 473 ms    Calculated P Axis 72 degrees    Calculated R Axis -32 degrees    Calculated T Axis 25 degrees    Diagnosis       Sinus rhythm with premature atrial complexes  Left axis deviation  Right bundle branch block  Abnormal ECG  When compared with ECG of 02-MAR-2020 16:34,  premature atrial complexes are now present     CBC WITH AUTOMATED DIFF    Collection Time: 03/02/20 11:25 PM   Result Value Ref Range    WBC 8.1 4.6 - 13.2 K/uL    RBC 3.31 (L) 4.20 - 5.30 M/uL    HGB 10.8 (L) 12.0 - 16.0 g/dL    HCT 31.8 (L) 35.0 - 45.0 %    MCV 96.1 74.0 - 97.0 FL    MCH 32.6 24.0 - 34.0 PG    MCHC 34.0 31.0 - 37.0 g/dL    RDW 12.2 11.6 - 14.5 %    PLATELET 520 678 - 430 K/uL    MPV 9.7 9.2 - 11.8 FL    NEUTROPHILS 71 40 - 73 %    LYMPHOCYTES 17 (L) 21 - 52 %    MONOCYTES 10 3 - 10 %    EOSINOPHILS 2 0 - 5 %    BASOPHILS 0 0 - 2 %    ABS. NEUTROPHILS 5.8 1.8 - 8.0 K/UL    ABS. LYMPHOCYTES 1.4 0.9 - 3.6 K/UL    ABS. MONOCYTES 0.8 0.05 - 1.2 K/UL    ABS. EOSINOPHILS 0.2 0.0 - 0.4 K/UL    ABS.  BASOPHILS 0.0 0.0 - 0.1 K/UL    DF AUTOMATED     METABOLIC PANEL, COMPREHENSIVE    Collection Time: 03/02/20 11:25 PM   Result Value Ref Range    Sodium 131 (L) 136 - 145 mmol/L    Potassium 3.6 3.5 - 5.5 mmol/L    Chloride 94 (L) 100 - 111 mmol/L    CO2 25 21 - 32 mmol/L    Anion gap 12 3.0 - 18 mmol/L    Glucose 102 (H) 74 - 99 mg/dL    BUN 22 (H) 7.0 - 18 MG/DL    Creatinine 0.93 0.6 - 1.3 MG/DL    BUN/Creatinine ratio 24 (H) 12 - 20      GFR est AA >60 >60 ml/min/1.73m2    GFR est non-AA 56 (L) >60 ml/min/1.73m2    Calcium 8.6 8.5 - 10.1 MG/DL    Bilirubin, total 0.4 0.2 - 1.0 MG/DL    ALT (SGPT) 20 13 - 56 U/L    AST (SGOT) 20 10 - 38 U/L    Alk. phosphatase 67 45 - 117 U/L    Protein, total 5.9 (L) 6.4 - 8.2 g/dL    Albumin 3.4 3.4 - 5.0 g/dL    Globulin 2.5 2.0 - 4.0 g/dL    A-G Ratio 1.4 0.8 - 1.7     CARDIAC PANEL,(CK, CKMB & TROPONIN)    Collection Time: 03/02/20 11:25 PM   Result Value Ref Range    CK 53 26 - 192 U/L    CK - MB 1.6 <3.6 ng/ml    CK-MB Index 3.0 0.0 - 4.0 %    Troponin-I, QT 0.02 0.0 - 0.045 NG/ML   GLUCOSE, POC    Collection Time: 03/02/20 11:31 PM   Result Value Ref Range    Glucose (POC) 98 70 - 110 mg/dL       Radiologic Studies -   XR CHEST PORT   Final Result   Impression:   --------------      No active cardiopulmonary disease. CT Results  (Last 48 hours)               03/02/20 1844  CT HEAD WO CONT Final result    Impression:  IMPRESSION:       No acute intracranial abnormalities. Narrative:  EXAM: CT head       INDICATION: Hypertension and blurry vision       COMPARISON: None. TECHNIQUE: Axial CT imaging of the head was performed without intravenous   contrast. One or more dose reduction techniques were used on this CT: automated   exposure control, adjustment of the mAs and/or kVp according to patient size,   and iterative reconstruction techniques. The specific techniques used on this   CT exam have been documented in the patient's electronic medical record. Digital   Imaging and Communications in Medicine (DICOM) format image data are available   to nonaffiliated external healthcare facilities or entities on a secure, media   free, reciprocally searchable basis with patient authorization for at least a   12-month period after this study.        _______________       FINDINGS:       BRAIN AND POSTERIOR FOSSA: The sulci, folia, ventricles and basal cisterns are   within normal limits for the patient's age. There is no intracranial hemorrhage,   mass effect, or midline shift. There is moderate small vessel ischemic disease. Is the       EXTRA-AXIAL SPACES AND MENINGES: There are no abnormal extra-axial fluid   collections. CALVARIUM: Intact. SINUSES: Clear. OTHER: None.       _______________               CXR Results  (Last 48 hours)               03/02/20 8869  XR CHEST PORT Final result    Impression:  Impression:   --------------       No active cardiopulmonary disease. Narrative:  ---------------------------------------------------------------------------   <<<<<<<<<           [address] [address] [facility]           >>>>>>>>>    ---------------------------------------------------------------------------       CLINICAL HISTORY: Near syncope. COMPARISON EXAMINATIONS:  March 30, 2019.           ---  SINGLE FRONTAL VIEW OF THE CHEST  ---       The lungs and pleural spaces are clear. The mediastinum is unremarkable in   appearance. No significant osseous abnormalities are identified.             --------------             Medications given in the ED-  Medications   sodium chloride 0.9 % bolus infusion 500 mL (500 mL IntraVENous New Bag 3/2/20 9684)         Medical Decision Making   I am the first provider for this patient. I reviewed the vital signs, available nursing notes, past medical history, past surgical history, family history and social history. Vital Signs-Reviewed the patient's vital signs.     Pulse Oximetry Analysis -100 % on room air    Cardiac Monitor:  Rate: 64 bpm  Rhythm: Normal sinus    EKG interpretation: (Preliminary)  EKG read by Dr. Augie Azevedo at 11:21 PM  Sinus rhythm at a rate of 61 bpm, IA interval of 202 ms, QRS duration 130 ms, right bundle branch block noted    Records Reviewed: Nursing Notes, Old Medical Records and Previous electrocardiograms    Provider Notes (Medical Decision Making): Vic Soriano is a 80 y.o. female presenting after a near syncopal episode in the setting patient's hypotension had resolved by the time she arrived in the emergency department. Her labs are benign and she feels much better now and is eager for discharge. Suspect that this hypotension episode was secondary to the dose of clonidine she received in the emergency department earlier in the day. She is currently asymptomatic and hemodynamically stable. Plan for discharge with early primary care follow-up and strict return precautions. Patient and her daughters understand and agree with this plan. Procedures:  Procedures    ED Course:   12:29 AM  Updated patient on all results and plan. All questions answered. Diagnosis and Disposition     Critical Care: None    DISCHARGE NOTE:    Ed Francois  results have been reviewed with her. She has been counseled regarding her diagnosis, treatment, and plan. She verbally conveys understanding and agreement of the signs, symptoms, diagnosis, treatment and prognosis and additionally agrees to follow up as discussed. She also agrees with the care-plan and conveys that all of her questions have been answered. I have also provided discharge instructions for her that include: educational information regarding their diagnosis and treatment, and list of reasons why they would want to return to the ED prior to their follow-up appointment, should her condition change. She has been provided with education for proper emergency department utilization. CLINICAL IMPRESSION:    1. Near syncope    2. Hypotension, unspecified hypotension type        PLAN:  1. D/C Home  2. Current Discharge Medication List        3.    Follow-up Information     Follow up With Specialties Details Why Eleuterio Linares DO Internal Medicine Schedule an appointment as soon as possible for a visit  [address] [address]  [phone]      [facility] EMERGENCY DEPT Emergency Medicine  If symptoms worsen [address] [address]  [phone]        _______________________________      Please note that this dictation was completed with [employer], the computer voice recognition software. Quite often unanticipated grammatical, syntax, homophones, and other interpretive errors are inadvertently transcribed by the computer software. Please disregard these errors. Please excuse any errors that have escaped final proofreading.

## 2020-03-03 NOTE — ED NOTES
Pt home with daughter at [facility], ambulatory with a steady gait. To f/u with her PCP for recently adjusted BP meds and HTN today    Patient armband removed and shredded  Discharge medications reviewed with patient and appropriate educational materials and side effects teaching were provided.   Follow-up Information     Follow up With Specialties Details Why Justin Linares DO Internal Medicine Schedule an appointment as soon as possible for a visit in 2 days  [address] [facility]      [facility] EMERGENCY DEPT Emergency Medicine  As needed if symptoms worsen [address] [place] [address] [phone]

## 2020-03-03 NOTE — ED NOTES
RN in to assess pt. Repositioned for comfort. Additional needs assessed. No apparent distress. No additional needs expressed at this time. Resting comfortably, awaiting further orders/procedures.     Pt able to stand with slight dizziness initially, resolved quickly

## 2020-03-03 NOTE — DISCHARGE INSTRUCTIONS
Patient Education        Lightheadedness or Faintness: Care Instructions  Your Care Instructions  Lightheadedness is a feeling that you are about to faint or \"pass out. \" You do not feel as if you or your surroundings are moving. It is different from vertigo, which is the feeling that you or things around you are spinning or tilting. Lightheadedness usually goes away or gets better when you lie down. If lightheadedness gets worse, it can lead to a fainting spell. It is common to feel lightheaded from time to time. Lightheadedness usually is not caused by a serious problem. It often is caused by a short-lasting drop in blood pressure and blood flow to your head that occurs when you get up too quickly from a seated or lying position. Follow-up care is a key part of your treatment and safety. Be sure to make and go to all appointments, and call your doctor if you are having problems. It's also a good idea to know your test results and keep a list of the medicines you take. How can you care for yourself at home? · Lie down for 1 or 2 minutes when you feel lightheaded. After lying down, sit up slowly and remain sitting for 1 to 2 minutes before slowly standing up. · Avoid movements, positions, or activities that have made you lightheaded in the past.  · Get plenty of rest, especially if you have a cold or flu, which can cause lightheadedness. · Make sure you drink plenty of fluids, especially if you have a fever or have been sweating. · Do not drive or put yourself and others in danger while you feel lightheaded. When should you call for help? Call 911 anytime you think you may need emergency care. For example, call if:    · You have symptoms of a stroke. These may include:  ? Sudden numbness, tingling, weakness, or loss of movement in your face, arm, or leg, especially on only one side of your body. ? Sudden vision changes. ? Sudden trouble speaking.   ? Sudden confusion or trouble understanding simple statements. ? Sudden problems with walking or balance. ? A sudden, severe headache that is different from past headaches.     · You have symptoms of a heart attack. These may include:  ? Chest pain or pressure, or a strange feeling in the chest.  ? Sweating. ? Shortness of breath. ? Nausea or vomiting. ? Pain, pressure, or a strange feeling in the back, neck, jaw, or upper belly or in one or both shoulders or arms. ? Lightheadedness or sudden weakness. ? A fast or irregular heartbeat. After you call 911, the  may tell you to chew 1 adult-strength or 2 to 4 low-dose aspirin. Wait for an ambulance. Do not try to drive yourself.    Watch closely for changes in your health, and be sure to contact your doctor if:    · Your lightheadedness gets worse or does not get better with home care. Where can you learn more? Go to http://wesley-joe.info/. Enter V058 in the search box to learn more about \"Lightheadedness or Faintness: Care Instructions. \"  Current as of: June 26, 2019  Content Version: 12.2  © 2175-2309 [employer]. Care instructions adapted under license by [employer] (which disclaims liability or warranty for this information). If you have questions about a medical condition or this instruction, always ask your healthcare professional. [address] [address] any warranty or liability for your use of this information. Patient Education        Low Blood Pressure: Care Instructions  Your Care Instructions    Blood pressure is a measurement of the force of the blood against the walls of the blood vessels during and after each beat of the heart. Low blood pressure is also called hypotension. It means that your blood pressure is much lower than normal. Some people, especially young, slim women, may have slightly low blood pressure without symptoms.  But in many people, low blood pressure can cause symptoms such as feeling dizzy or lightheaded. When your blood pressure is too low, your heart, brain, and other organs do not get enough blood. Low blood pressure can be caused by many things, including heart problems and some medicines. Diabetes that is not under control can cause your blood pressure to drop. And so can a severe allergic reaction or infection. Another cause is dehydration, which is when your body loses too much fluid. Treatment for low blood pressure depends on the cause. Follow-up care is a key part of your treatment and safety. Be sure to make and go to all appointments, and call your doctor if you are having problems. It's also a good idea to know your test results and keep a list of the medicines you take. How can you care for yourself at home? · Drink plenty of fluids, enough so that your urine is light yellow or clear like water. If you have kidney, heart, or liver disease and have to limit fluids, talk with your doctor before you increase the amount of fluids you drink. · Be safe with medicines. Call your doctor if you think you are having a problem with your medicine. You will get more details on the specific medicines your doctor prescribes. · Stand up or get out of bed very slowly to allow your body to adjust.  · Get plenty of rest.  · Do not smoke. Smoking increases your risk of heart attack. If you need help quitting, talk to your doctor about stop-smoking programs and medicines. These can increase your chances of quitting for good. · Limit alcohol to 2 drinks a day for men and 1 drink a day for women. Alcohol may interfere with your medicine. In addition, alcohol can make your low blood pressure worse by causing your body to lose water. When should you call for help? Call 911 anytime you think you may need emergency care. For example, call if:    · You passed out (lost consciousness).    Call your doctor now or seek immediate medical care if:    · You are dizzy or lightheaded, or you feel like you may faint.  Watch closely for changes in your health, and be sure to contact your doctor if you have any problems. Where can you learn more? Go to http://wesley-joe.info/. Enter C304 in the search box to learn more about \"Low Blood Pressure: Care Instructions. \"  Current as of: April 9, 2019  Content Version: 12.2  © 3584-0600 [employer]. Care instructions adapted under license by [employer] (which disclaims liability or warranty for this information). If you have questions about a medical condition or this instruction, always ask your healthcare professional. [address] any warranty or liability for your use of this information.

## 2020-03-03 NOTE — ED NOTES
Assumed c/o pt, report taken. Pt AOx4, Family at [facility]. Awaiting CT results and dispo.  VSS

## 2020-03-03 NOTE — DISCHARGE INSTRUCTIONS
You were seen and evaluated in the Emergency Department. Please understand that your work up is not all encompassing and you should follow up with your primary care physician for further management and continuity of care. You were noted to have mild low sodium on your lab work. Please make sure you follow-up with your PCP for recheck of your sodium. Please return to Emergency Department or seek medical attention immediately if you have acute worsening in your symptoms or develop chest pain, shortness of breath, repeated vomiting, fever, altered level of consciousness, coughing up blood, or start sweating and feel clammy. If you were prescribed any medicine for home, please take as prescribed by your health-care provider. If you were given any follow-up appointments or numbers to call, please do so as instructed. Avoid any tobacco products or excessive alcohol. Patient Education        Learning About High Blood Pressure  What is high blood pressure? Blood pressure is a measure of how hard the blood pushes against the walls of your arteries. It's normal for blood pressure to go up and down throughout the day, but if it stays up, you have high blood pressure. Another name for high blood pressure is hypertension. Two numbers tell you your blood pressure. The first number is the systolic pressure. It shows how hard the blood pushes when your heart is pumping. The second number is the diastolic pressure. It shows how hard the blood pushes between heartbeats, when your heart is relaxed and filling with blood. Your doctor will give you a goal for your blood pressure. Your goal will be based on your health and your age. High blood pressure (hypertension) means that the top number stays high, or the bottom number stays high, or both. High blood pressure increases the risk of stroke, heart attack, and other problems.  You and your doctor will talk about your risks of these problems based on your blood pressure. What happens when you have high blood pressure? · Blood flows through your arteries with too much force. Over time, this damages the walls of your arteries. But you can't feel it. High blood pressure usually doesn't cause symptoms. · Fat and calcium start to build up in your arteries. This buildup is called plaque. Plaque makes your arteries narrower and stiffer. Blood can't flow through them as easily. · This lack of good blood flow starts to damage some of the organs in your body. This can lead to problems such as coronary artery disease and heart attack, heart failure, stroke, kidney failure, and eye damage. How can you prevent high blood pressure? · Stay at a healthy weight. · Try to limit how much sodium you eat to less than 2,300 milligrams (mg) a day. If you limit your sodium to 1,500 mg a day, you can lower your blood pressure even more. ? Buy foods that are labeled \"unsalted,\" \"sodium-free,\" or \"low-sodium. \" Foods labeled \"reduced-sodium\" and \"light sodium\" may still have too much sodium. ? Flavor your food with garlic, lemon juice, onion, vinegar, herbs, and spices instead of salt. Do not use soy sauce, steak sauce, onion salt, garlic salt, mustard, or ketchup on your food. ? Use less salt (or none) when recipes call for it. You can often use half the salt a recipe calls for without losing flavor. · Be physically active. Get at least 30 minutes of exercise on most days of the week. Walking is a good choice. You also may want to do other activities, such as running, swimming, cycling, or playing tennis or team sports. · Limit alcohol to 2 drinks a day for men and 1 drink a day for women. · Eat plenty of fruits, vegetables, and low-fat dairy products. Eat less saturated and total fats. How is high blood pressure treated? · Your doctor will suggest making lifestyle changes to help your heart.  For example, your doctor may ask you to eat healthy foods, quit smoking, lose extra weight, and be more active. · If lifestyle changes don't help enough, your doctor may recommend that you take medicine. · When blood pressure is very high, medicines are needed to lower it. Follow-up care is a key part of your treatment and safety. Be sure to make and go to all appointments, and call your doctor if you are having problems. It's also a good idea to know your test results and keep a list of the medicines you take. Where can you learn more? Go to http://wesley-joe.info/. Enter P501 in the search box to learn more about \"Learning About High Blood Pressure. \"  Current as of: April 9, 2019  Content Version: 12.2  © 1345-3687 [employer]. Care instructions adapted under license by [employer] (which disclaims liability or warranty for this information). If you have questions about a medical condition or this instruction, always ask your healthcare professional. [place] [address] any warranty or liability for your use of this information. Patient Education        Learning About Diuretics for High Blood Pressure  Introduction  Diuretics help to lower blood pressure. This reduces your risk of a heart attack and stroke. It also reduces your risk of kidney disease. Diuretics cause your kidneys to remove sodium and water. They also relax the blood vessel walls. These help lower your blood pressure. Examples  · Chlorthalidone  · Hydrochlorothiazide  Possible side effects  There are some common side effects. They are:  · Too little potassium. · Feeling dizzy. · Rash. · Urinating a lot. · High blood sugar. (But this is not common.)  You may have other side effects. Check the information that comes with your medicine. What to know about taking this medicine  · You may take other medicines for blood pressure. Diuretics can help those work better. They can also prevent extra fluid in your body. · You may need to take potassium pills.  Or you may have to watch how much potassium is in your food. Ask your doctor about this. · You may need blood tests to check your kidneys and your potassium level. · Take your medicines exactly as prescribed. Call your doctor if you think you are having a problem with your medicine. · Check with your doctor or pharmacist before you use any other medicines. This includes over-the-counter medicines. Make sure your doctor knows all of the medicines, vitamins, herbal products, and supplements you take. Taking some medicines together can cause problems. Where can you learn more? Go to http://wesley-joe.info/. Enter A653 in the search box to learn more about \"Learning About Diuretics for High Blood Pressure. \"  Current as of: April 9, 2019  Content Version: 12.2  © 5735-2148 [employer]. Care instructions adapted under license by [employer] (which disclaims liability or warranty for this information). If you have questions about a medical condition or this instruction, always ask your healthcare professional. [address] any warranty or liability for your use of this information.

## 2020-03-03 NOTE — ED NOTES
Pt home ambulatory with steady gait. Assisted to car in [place]. Daughters at side as drivers. To f/u per orders  Patient armband removed and shredded  I have reviewed discharge instructions with the patient and caregiver. The patient and caregiver verbalized understanding.   Follow-up Information     Follow up With Specialties Details Why Contact Info    [address], Sunil Caldwell, DO Internal Medicine Schedule an appointment as soon as possible for a visit  [facility] [facility]      [facility] EMERGENCY DEPT Emergency Medicine  If symptoms worsen [address] [address]  [phone]

## 2020-03-05 LAB
ATRIAL RATE: 61 BPM
ATRIAL RATE: 77 BPM
CALCULATED P AXIS, ECG09: 72 DEGREES
CALCULATED P AXIS, ECG09: 77 DEGREES
CALCULATED R AXIS, ECG10: -32 DEGREES
CALCULATED R AXIS, ECG10: -77 DEGREES
CALCULATED T AXIS, ECG11: 10 DEGREES
CALCULATED T AXIS, ECG11: 25 DEGREES
DIAGNOSIS, 93000: NORMAL
DIAGNOSIS, 93000: NORMAL
P-R INTERVAL, ECG05: 202 MS
P-R INTERVAL, ECG05: 212 MS
Q-T INTERVAL, ECG07: 422 MS
Q-T INTERVAL, ECG07: 470 MS
QRS DURATION, ECG06: 130 MS
QRS DURATION, ECG06: 136 MS
QTC CALCULATION (BEZET), ECG08: 473 MS
QTC CALCULATION (BEZET), ECG08: 477 MS
VENTRICULAR RATE, ECG03: 61 BPM
VENTRICULAR RATE, ECG03: 77 BPM

## 2020-09-19 ENCOUNTER — HOSPITAL ENCOUNTER (EMERGENCY)
Age: 85
Discharge: HOME | End: 2020-09-19
Attending: EMERGENCY MEDICINE
Payer: MEDICARE

## 2020-09-19 ENCOUNTER — APPOINTMENT (OUTPATIENT)
Dept: GENERAL RADIOLOGY | Age: 85
End: 2020-09-19
Attending: EMERGENCY MEDICINE
Payer: MEDICARE

## 2020-09-19 ENCOUNTER — APPOINTMENT (OUTPATIENT)
Dept: CT IMAGING | Age: 85
End: 2020-09-19
Attending: EMERGENCY MEDICINE
Payer: MEDICARE

## 2020-09-19 VITALS
TEMPERATURE: 97.6 F | DIASTOLIC BLOOD PRESSURE: 61 MMHG | SYSTOLIC BLOOD PRESSURE: 160 MMHG | HEART RATE: 91 BPM | HEIGHT: 60 IN | WEIGHT: 164.8 LBS | OXYGEN SATURATION: 99 % | RESPIRATION RATE: 19 BRPM | BODY MASS INDEX: 32.35 KG/M2
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
## 2020-09-19 DIAGNOSIS — S49.91XA RIGHT SHOULDER INJURY, INITIAL ENCOUNTER: ICD-10-CM

## 2020-09-19 DIAGNOSIS — S20.211A RIB CONTUSION, RIGHT, INITIAL ENCOUNTER: ICD-10-CM

## 2020-09-19 DIAGNOSIS — S61.411A LACERATION OF RIGHT HAND WITHOUT FOREIGN BODY, INITIAL ENCOUNTER: ICD-10-CM

## 2020-09-19 PROCEDURE — 73130 X-RAY EXAM OF HAND: CPT

## 2020-09-19 PROCEDURE — 75810000293 HC SIMP/SUPERF WND  RPR

## 2020-09-19 PROCEDURE — 71250 CT THORAX DX C-: CPT

## 2020-09-19 PROCEDURE — 73030 X-RAY EXAM OF SHOULDER: CPT

## 2020-09-19 PROCEDURE — 99285 EMERGENCY DEPT VISIT HI MDM: CPT

## 2020-09-19 RX ORDER — LIDOCAINE 4 G/100G
1 PATCH TOPICAL EVERY 12 HOURS
Qty: 10 PATCH | Refills: 0 | Status: SHIPPED | OUTPATIENT
Start: 2020-09-19

## 2020-09-19 NOTE — ED PROVIDER NOTES
EMERGENCY DEPARTMENT HISTORY AND PHYSICAL EXAM    Date: 9/19/2020  Patient Name: Paulina Lovett    History of Presenting Illness     Chief Complaint   Patient presents with   [address] Fall         History Provided By: Patient and EMS    Paulina Lovett is a 80 y.o. female who presents to the emergency department C/O fall, right side injury and right hand laceration. Patient presents by EMS. States that she had a mechanical fall as she was trying to use the bathroom having her pants down at her legs causing her to trip and fall. She states she landed on the right side of her upper body injuring her right shoulder and right ribs and into the right scapular region. She also notes she cut her right hand but is unsure what she cut it on. She states she is still able to move all of her fingers. She denies any injury to her head, neck or back abdomen or hips or legs. She denies any elbow or wrist pain or any injury to the left side of her body. She states she does have a history of an impingement of her right shoulder. PCP: Dexter French, DO    Current Outpatient Medications   Medication Sig Dispense Refill    lidocaine 4 % patch 1 Patch by TransDERmal route every twelve (12) hours every twelve (12) hours. 10 Patch 0    valsartan-hydroCHLOROthiazide (DIOVAN-HCT) 160-25 mg per tablet Take 1 Tab by mouth daily.  sertraline (ZOLOFT) 50 mg tablet Take 50 mg by mouth daily.  bimatoprost (LUMIGAN) 0.01 % ophthalmic drops Administer 1 Drop to both eyes every evening.  brimonidine-timolol (COMBIGAN) 0.2-0.5 % drop ophthalmic solution 1 Drop every twelve (12) hours.  levothyroxine (SYNTHROID) 100 mcg tablet Take 100 mcg by mouth Daily (before breakfast).  omeprazole (PRILOSEC) 20 mg capsule Take 20 mg by mouth daily.  calcium-cholecalciferol, d3, (CALCIUM 600 + D) 600-125 mg-unit tab Take  by mouth.          Past History     Past Medical History:  Past Medical History:   Diagnosis Date  Cancer ([facility] [address]     Diverticulitis     Hypertension     Hypothyroid        Past Surgical History:  Past Surgical History:   Procedure Laterality Date    HX MASTECTOMY      right    HX ORTHOPAEDIC      shoulder       Family History:  History reviewed. No pertinent family history. Social History:  Social History     Tobacco Use    Smoking status: Never Smoker    Smokeless tobacco: Never Used   Substance Use Topics    Alcohol use: No    Drug use: Not on file       Allergies: Allergies   Allergen Reactions    Levaquin [Levofloxacin] Other (comments)     Tremors       Pcn [Penicillins] Rash         Review of Systems   Review of Systems   Constitutional: Negative for fever. Respiratory: Negative for choking, chest tightness, shortness of breath and wheezing. Cardiovascular: Positive for chest pain. Gastrointestinal: Negative for abdominal pain, nausea and vomiting. Genitourinary: Negative for dysuria. Musculoskeletal: Positive for arthralgias and myalgias. Skin: Positive for wound. Neurological: Negative for syncope and headaches. All other systems reviewed and are negative. All other systems reviewed and are negative. Physical Exam     Vitals:    09/19/20 1509 09/19/20 1545   BP: (!) 173/96 (!) 164/70   Pulse: 91 91   Resp: 19    Temp: 97.6 °F (36.4 °C)    SpO2: 100% 98%   Weight: 74.8 kg (164 lb 12.8 oz)    Height: 5' (1.524 m)      Physical Exam    Nursing notes and vital signs reviewed    Airway: intact, speaking normally  Breathing: No apparent distress, no cyanosis  Circulation: Peripheral pulses equal    Constitutional: Non toxic appearing, no acute distress, appearing stated age.   Chronically ill-appearing  HEENT:  Head: Normocephalic, Atraumatic  Eyes: PERRL, EOMI, No conjunctival injection  Ears: external ears normal  Nose: No rhinorrhea, external nose normal  Throat: mucous membranes moist  Neck: symmetric, trachea midline, no obvious swelling, no JVD  Cardiovascular: Regular rate and rhythm, no murmurs  Lungs: Clear to ausculation bilaterally, No stridor, Normal work of breathing and chest excursion bilaterally  Abdomen: Soft, non tender, non distended, normoactive bowel sounds, No rigidity, no peritoneal signs  Musculoskeletal: Reproducible tenderness to the right mid axillary rib region and the posterior ribs along the level of the scapula without any palpable crepitus or structural deformity. Decreased range of motion to the right shoulder that the patient notes is chronic due to her impingement, there is mild tenderness to palpation but no obvious deformity. No evidence of obvious deformity to the back, neck or lower extremities, no LE edema  Skin: Warm, dry, No obvious rashes, there is a 3 cm laceration to the right hand along the medial aspect of the palm at the base of the fifth digit, there is no active bleeding. There is no evidence of tendon injury as the patient has full range of motion. Neuro: Alert and oriented x 3, CN 2-12 intact, normal speech, strength and sensation full and symmetric bilaterally  Psychiatric: Normal mood and affect      Diagnostic Study Results     Labs -   No results found for this or any previous visit (from the past 72 hour(s)). Radiologic Studies -   CT CHEST WO CONT   Final Result   IMPRESSION:      1. No acute osseous abnormality in the chest.   2. No focal consolidative process in the lungs. XR HAND RT MIN 3 V   Final Result   IMPRESSION:      Negative for fracture or dislocation in the right hand. XR SHOULDER RT AP/LAT MIN 2 V   Final Result   IMPRESSION:      Advanced joint arthropathy but negative for fracture or dislocation in the right   shoulder. CT Results  (Last 48 hours)               09/19/20 1619  CT CHEST WO CONT Final result    Impression:  IMPRESSION:       1. No acute osseous abnormality in the chest.   2. No focal consolidative process in the lungs.        Narrative:  EXAM: CT chest        CLINICAL INDICATION/HISTORY:  Chest pain following trauma. COMPARISON: 03/02/2020. TECHNIQUE: Helical CT imaging from the thoracic inlet through the diaphragm   without intravenous contrast. Multiplanar reformats were generated. One or more dose reduction techniques were used on this CT: automated exposure   control, adjustment of the mAs and/or kVp according to patient size, and   iterative reconstruction techniques. The specific techniques used on this CT   exam have been documented in the patient's electronic medical record. Digital   Imaging and Communications in Medicine (DICOM) format image data are available   to nonaffiliated external healthcare facilities or entities on a secure, media   free, reciprocally searchable basis with patient authorization for at least a   12-month period after this study. _______________       FINDINGS:       LUNGS: No suspicious nodule or mass. No abnormal opacities. PLEURA: Normal.       AIRWAY: Normal.       MEDIASTINUM: Normal heart size. No pericardial effusion. Atherosclerosis of the   thoracic aorta and coronary arteries. LYMPH NODES: No enlarged lymph nodes. UPPER ABDOMEN: Unremarkable. OTHER: Postsurgical changes of left shoulder arthroplasty. There is degenerative   arthropathy of the right shoulder. There are old, healed fracture deformities of   the left lateral fifth - seventh ribs.       _______________               CXR Results  (Last 48 hours)    None          Medications given in the ED-  Medications - No data to display      Medical Decision Making     I reviewed the vital signs, available nursing notes, past medical history, past surgical history, family history and social history. Vital Signs interpretation- I have reviewed the patient's vital signs.     Pulse Oximetry interpretation - 100% on Room air     Cardiac Monitor interpretation:  Rate: 91 bpm  Rhythm: sinus    Records Reviewed: Nursing Notes and Old Medical Records    Procedures:  Wound Repair    Date/Time: 9/19/2020 4:45 PM  Performed by: [id number] [email or web address] ([email or web address]) [facility] provider: Cynthia Polanco   Preparation: skin prepped with [id number]  Pre-procedure re-eval: Immediately prior to the procedure, the patient was reevaluated and found suitable for the planned procedure and any planned medications. Time out: Immediately prior to the procedure a time out was called to verify the correct patient, procedure, equipment, staff and marking as appropriate. .  Location details: right hand  Wound length:2.5 cm or less  Anesthesia: local infiltration    Anesthesia:  Local Anesthetic: lidocaine 1% without epinephrine  Foreign bodies: no foreign bodies  Irrigation solution: saline  Skin closure: Prolene (5-0)  Number of sutures: 9  Technique: simple and interrupted  Approximation: close  Lip approximation: vermillion border well aligned  Dressing: 4x4  Patient tolerance: Patient tolerated the procedure well with no immediate complications  My total time at bedside, performing this procedure was 1-15 minutes. ED Course & MDM:   Risk stratification: Considering the advanced age of the patient will obtain a CT scan of her chest without contrast to evaluate for scapular or rib injury. Will obtain x-ray of the right shoulder and of the right hand. Data review: CT scan and x-ray show no evidence of fracture or bony process. Laceration repair was completed without immediate complication. Problem follow up: Discussed with the patient and daughter at bedside the results of her imaging findings. I recommended she keep the wound clean and dry and follow-up with her primary care physician in 2 weeks for suture removal otherwise come back to the emergency department earlier than that if she develops any signs or symptoms of infection or wound dehiscence. I stated that she is likely suffered some level of rib/chest wall contusion as well as a shoulder contusion.   Recommended lidocaine patches as directed for pain and Tylenol. Recommended to follow-up with her primary care physician for reevaluation otherwise come back to the emergency department if symptoms worsen. They understand and agree to plan    Diagnosis and Disposition         DISCHARGE NOTE:    Jose Luis Fregoso  results have been reviewed with her. She has been counseled regarding her diagnosis, treatment, and plan. She verbally conveys understanding and agreement of the signs, symptoms, diagnosis, treatment and prognosis and additionally agrees to follow up as discussed. She also agrees with the care-plan and conveys that all of her questions have been answered. I have also provided discharge instructions for her that include: educational information regarding their diagnosis and treatment, and list of reasons why they would want to return to the ED prior to their follow-up appointment, should her condition change. She has been provided with education for proper emergency department utilization. CLINICAL IMPRESSION:    1. Fall, initial encounter    2. Rib contusion, right, initial encounter    3. Right shoulder injury, initial encounter    4. Laceration of right hand without foreign body, initial encounter        PLAN:  1. D/C Home  2. Current Discharge Medication List      START taking these medications    Details   lidocaine 4 % patch 1 Patch by TransDERmal route every twelve (12) hours every twelve (12) hours. Qty: 10 Patch, Refills: 0           3.    Follow-up Information     Follow up With Specialties Details Why Contact Info    [address], Barbara Castellano,  Internal Medicine Schedule an appointment as soon as possible for a visit in 2 weeks For wound re-check, For suture removal [facility]      [facility] EMERGENCY DEPT Emergency Medicine Go to  If symptoms worsen [address] [address]  [phone]        _______________________________      Please note that this dictation was completed with [employer] voice recognition software. Quite often unanticipated grammatical, syntax, homophones, and other interpretive errors are inadvertently transcribed by the computer software. Please disregard these errors. Please excuse any errors that have escaped final proofreading.

## 2022-03-19 PROBLEM — S61.411A LACERATION OF RIGHT HAND WITHOUT FOREIGN BODY: Status: ACTIVE | Noted: 2020-09-19

## 2022-03-19 PROBLEM — K85.90 PANCREATITIS: Status: ACTIVE | Noted: 2018-07-27

## 2022-03-19 PROBLEM — S49.91XA RIGHT SHOULDER INJURY, INITIAL ENCOUNTER: Status: ACTIVE | Noted: 2020-09-19

## 2022-03-19 PROBLEM — S20.211A RIB CONTUSION, RIGHT, INITIAL ENCOUNTER: Status: ACTIVE | Noted: 2020-09-19

## 2022-03-19 PROBLEM — K85.90 ACUTE PANCREATITIS: Status: ACTIVE | Noted: 2018-07-27
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
## 2022-03-20 PROBLEM — K80.20 CHOLELITHIASIS: Status: ACTIVE | Noted: 2018-07-27

## 2022-03-22 ENCOUNTER — APPOINTMENT (OUTPATIENT)
Dept: GENERAL RADIOLOGY | Age: 87
End: 2022-03-22
Attending: STUDENT IN AN ORGANIZED HEALTH CARE EDUCATION/TRAINING PROGRAM
Payer: MEDICARE

## 2022-03-22 ENCOUNTER — HOSPITAL ENCOUNTER (EMERGENCY)
Age: 87
Discharge: HOME | End: 2022-03-22
Attending: STUDENT IN AN ORGANIZED HEALTH CARE EDUCATION/TRAINING PROGRAM
Payer: MEDICARE

## 2022-03-22 VITALS
SYSTOLIC BLOOD PRESSURE: 156 MMHG | HEART RATE: 86 BPM | HEIGHT: 61 IN | WEIGHT: 150 LBS | BODY MASS INDEX: 28.32 KG/M2 | DIASTOLIC BLOOD PRESSURE: 81 MMHG | RESPIRATION RATE: 19 BRPM | OXYGEN SATURATION: 94 % | TEMPERATURE: 97.3 F

## 2022-03-22 DIAGNOSIS — I10 ESSENTIAL HYPERTENSION: Primary | ICD-10-CM

## 2022-03-22 DIAGNOSIS — M25.473 ANKLE SWELLING, UNSPECIFIED LATERALITY: ICD-10-CM

## 2022-03-22 DIAGNOSIS — I49.3 ASYMPTOMATIC PVCS: ICD-10-CM

## 2022-03-22 LAB
ALBUMIN SERPL-MCNC: 3.6 G/DL (ref 3.4–5)
ALBUMIN/GLOB SERPL: 1.2 {RATIO} (ref 0.8–1.7)
ALP SERPL-CCNC: 89 U/L (ref 45–117)
ALT SERPL-CCNC: 28 U/L (ref 13–56)
ANION GAP SERPL CALC-SCNC: 7 MMOL/L (ref 3–18)
AST SERPL-CCNC: 24 U/L (ref 10–38)
BASOPHILS # BLD: 0 K/UL (ref 0–0.1)
BASOPHILS NFR BLD: 1 % (ref 0–2)
BILIRUB SERPL-MCNC: 0.5 MG/DL (ref 0.2–1)
BNP SERPL-MCNC: 2554 PG/ML (ref 0–1800)
BUN SERPL-MCNC: 8 MG/DL (ref 7–18)
BUN/CREAT SERPL: 11 (ref 12–20)
CALCIUM SERPL-MCNC: 8.7 MG/DL (ref 8.5–10.1)
CHLORIDE SERPL-SCNC: 108 MMOL/L (ref 100–111)
CO2 SERPL-SCNC: 25 MMOL/L (ref 21–32)
CREAT SERPL-MCNC: 0.74 MG/DL (ref 0.6–1.3)
DIFFERENTIAL METHOD BLD: ABNORMAL
EOSINOPHIL # BLD: 0.1 K/UL (ref 0–0.4)
EOSINOPHIL NFR BLD: 2 % (ref 0–5)
ERYTHROCYTE [DISTWIDTH] IN BLOOD BY AUTOMATED COUNT: 12.8 % (ref 11.6–14.5)
GLOBULIN SER CALC-MCNC: 2.9 G/DL (ref 2–4)
GLUCOSE SERPL-MCNC: 95 MG/DL (ref 74–99)
HCT VFR BLD AUTO: 37.6 % (ref 35–45)
HGB BLD-MCNC: 12 G/DL (ref 12–16)
IMM GRANULOCYTES # BLD AUTO: 0 K/UL (ref 0–0.04)
IMM GRANULOCYTES NFR BLD AUTO: 1 % (ref 0–0.5)
LYMPHOCYTES # BLD: 1.3 K/UL (ref 0.9–3.6)
LYMPHOCYTES NFR BLD: 22 % (ref 21–52)
MAGNESIUM SERPL-MCNC: 1.8 MG/DL (ref 1.6–2.6)
MCH RBC QN AUTO: 31.6 PG (ref 24–34)
MCHC RBC AUTO-ENTMCNC: 31.9 G/DL (ref 31–37)
MCV RBC AUTO: 98.9 FL (ref 78–100)
MONOCYTES # BLD: 0.7 K/UL (ref 0.05–1.2)
MONOCYTES NFR BLD: 12 % (ref 3–10)
NEUTS SEG # BLD: 3.8 K/UL (ref 1.8–8)
NEUTS SEG NFR BLD: 63 % (ref 40–73)
NRBC # BLD: 0 K/UL (ref 0–0.01)
NRBC BLD-RTO: 0 PER 100 WBC
PLATELET # BLD AUTO: 250 K/UL (ref 135–420)
PMV BLD AUTO: 10.4 FL (ref 9.2–11.8)
POTASSIUM SERPL-SCNC: 3.7 MMOL/L (ref 3.5–5.5)
PROT SERPL-MCNC: 6.5 G/DL (ref 6.4–8.2)
RBC # BLD AUTO: 3.8 M/UL (ref 4.2–5.3)
SODIUM SERPL-SCNC: 140 MMOL/L (ref 136–145)
TROPONIN-HIGH SENSITIVITY: 21 NG/L (ref 0–54)
TROPONIN-HIGH SENSITIVITY: 24 NG/L (ref 0–54)
WBC # BLD AUTO: 6 K/UL (ref 4.6–13.2)

## 2022-03-22 PROCEDURE — 83735 ASSAY OF MAGNESIUM: CPT

## 2022-03-22 PROCEDURE — 85025 COMPLETE CBC W/AUTO DIFF WBC: CPT

## 2022-03-22 PROCEDURE — 99285 EMERGENCY DEPT VISIT HI MDM: CPT

## 2022-03-22 PROCEDURE — 93005 ELECTROCARDIOGRAM TRACING: CPT

## 2022-03-22 PROCEDURE — 83880 ASSAY OF NATRIURETIC PEPTIDE: CPT

## 2022-03-22 PROCEDURE — 84484 ASSAY OF TROPONIN QUANT: CPT

## 2022-03-22 PROCEDURE — 96365 THER/PROPH/DIAG IV INF INIT: CPT

## 2022-03-22 PROCEDURE — 71045 X-RAY EXAM CHEST 1 VIEW: CPT

## 2022-03-22 PROCEDURE — 74011250636 HC RX REV CODE- 250/636: Performed by: STUDENT IN AN ORGANIZED HEALTH CARE EDUCATION/TRAINING PROGRAM

## 2022-03-22 PROCEDURE — 74011250637 HC RX REV CODE- 250/637: Performed by: STUDENT IN AN ORGANIZED HEALTH CARE EDUCATION/TRAINING PROGRAM

## 2022-03-22 PROCEDURE — 80053 COMPREHEN METABOLIC PANEL: CPT

## 2022-03-22 RX ORDER — MAGNESIUM SULFATE 1 G/100ML
1 INJECTION INTRAVENOUS ONCE
Status: COMPLETED | OUTPATIENT
Start: 2022-03-22 | End: 2022-03-22

## 2022-03-22 RX ORDER — AMLODIPINE BESYLATE 5 MG/1
5 TABLET ORAL
Status: COMPLETED | OUTPATIENT
Start: 2022-03-22 | End: 2022-03-22

## 2022-03-22 RX ADMIN — MAGNESIUM SULFATE HEPTAHYDRATE 1 G: 1 INJECTION, SOLUTION INTRAVENOUS at 15:47

## 2022-03-22 RX ADMIN — AMLODIPINE BESYLATE 5 MG: 5 TABLET ORAL at 14:26

## 2022-03-22 NOTE — ED NOTES
[address] HANDOFF      Patient was turned over to me at the regular change of shift by Dr. Toamsa Richard, at 9989. Patient presents emergency department with some leg swelling and increasing blood pressure concerns. Plan for this patient is: Patient pending repeat troponin for disposition. XR CHEST PORT   Final Result      No acute radiographic cardiopulmonary abnormality. Medications   magnesium sulfate 1 g/100 ml IVPB (premix or compounded) (1 g IntraVENous New Bag 3/22/22 1547)   amLODIPine (NORVASC) tablet 5 mg (5 mg Oral Given 3/22/22 1426)         Course:   ED Course as of 03/23/22 1058   Tue Mar 22, 2022   1539 Care was endorsed to me at the usual change of shift by Dr Tomasa Richard. Patient presented to the emergency department complaints of elevated blood pressure and leg swelling. Patient currently pending repeat high-sensitivity troponin which is scheduled for 3:50 PM. [EDUAR]   4739 I had a long discussion with the patient and her daughter. The patient is not interested in any sort of significant intervention. Discussed with him that she has very frequent PVCs. She not interested in hospitalization or intervention i.e. AICD or otherwise. I discussed with her that perhaps taking amlodipine at night with valsartan in the morning may be an acceptable option. She is interested in finding a new cardiologist.  I stressed that they are not interested in quality of life rather quality of life and thus would not be interested in any sort of tremendous intervention. Advised that we could try this regimen and as long as blood pressure is not dropping too quickly or should not become lightheaded especially acceptable. I have amlodipine at home. She would like a new cardiologist will have them see one of the cardiologist here at [facility]. [EDUAR]      ED Course User Index  [EDUAR] Kurt Rao,        Diagnosis:   1. Essential hypertension    2. Asymptomatic PVCs    3. Ankle swelling, unspecified laterality          Disposition: Discharge    Follow-up Information       Follow up With Specialties Details Why [address]    [facility] EMERGENCY DEPT Emergency Medicine  As needed, If symptoms worsen [address] [address]  [address] [facility], [address] [place] [address],  Internal Medicine Schedule an appointment as soon as possible for a visit   [facility]  [phone]              Patient's Medications   Start Taking    No medications on file   Continue Taking    BIMATOPROST (LUMIGAN) 0.01 % OPHTHALMIC DROPS    Administer 1 Drop to both eyes every evening. BRIMONIDINE-TIMOLOL (COMBIGAN) 0.2-0.5 % DROP OPHTHALMIC SOLUTION    1 Drop every twelve (12) hours. CALCIUM-CHOLECALCIFEROL, D3, (CALCIUM 600 + D) 600-125 MG-UNIT TAB    Take  by mouth. LEVOTHYROXINE (SYNTHROID) 100 MCG TABLET    Take 100 mcg by mouth Daily (before breakfast). LIDOCAINE 4 % PATCH    1 Patch by TransDERmal route every twelve (12) hours every twelve (12) hours. OMEPRAZOLE (PRILOSEC) 20 MG CAPSULE    Take 20 mg by mouth daily. SERTRALINE (ZOLOFT) 50 MG TABLET    Take 50 mg by mouth daily. VALSARTAN-HYDROCHLOROTHIAZIDE (DIOVAN-HCT) 160-25 MG PER TABLET    Take 1 Tab by mouth daily.    These Medications have changed    No medications on file   Stop Taking    No medications on file

## 2022-03-22 NOTE — DISCHARGE INSTRUCTIONS
Follow-up with either Dr. Thomas Perez or Dr. Varun Menchaca. If you are unable to see either then you should follow-up with your existing cardiologist if possible. I think it is reasonable to start taking the amlodipine at night and combination with valsartan however if you develop side effects or symptoms or your blood pressure becomes too low too quickly, then you should discontinue it. If you develop chest pain, shortness of breath, passing out or any other symptoms you really should return to the emergency room as soon as possible. Return to emergency department as needed.

## 2022-03-22 NOTE — ED PROVIDER NOTES
EMERGENCY DEPARTMENT HISTORY AND PHYSICAL EXAM    2:22 PM    Date: 3/22/2022  Patient Name: Kellie Otero    History of Presenting Illness     Chief Complaint   Patient presents with    Hypertension    Ankle swelling       History Provided By: Patient  Location/Duration/Severity/Modifying factors   HPI  Kellie Otero is a 80 y.o. female with past medical history of hypertension presenting for evaluation of hypertension and ankle swelling. She says that she has been monitoring her blood pressure daily, and notes that the diastolic number has been trending up. She discussed this with her cardiologist and primary care doctor, the cardiologist wanted to go up on her losartan dosing and her primary care doctor wanted start her on amlodipine, she says that she has a prescription and the pills for amlodipine at home but has not been taking them. Blood pressure has been trending to the 269-775 diastolic. She denies any severe headache, new changes in vision, vomiting, unilateral weakness or numbness or tingling. She denies any chest pain or shortness of breath but does endorse swelling of bilateral ankles over the last weeks. She has not mentioned this to her cardiologist.  Denies tobacco, alcohol or illicit drug use. No other medical complaint    PCP: Chung Aviles, DO    Current Facility-Administered Medications   Medication Dose Route Frequency Provider Last Rate Last Admin    magnesium sulfate 1 g/100 ml IVPB (premix or compounded)  1 g IntraVENous ONCE Gregorio Taylor  mL/hr at 03/22/22 1547 1 g at 03/22/22 1547     Current Outpatient Medications   Medication Sig Dispense Refill    lidocaine 4 % patch 1 Patch by TransDERmal route every twelve (12) hours every twelve (12) hours. 10 Patch 0    valsartan-hydroCHLOROthiazide (DIOVAN-HCT) 160-25 mg per tablet Take 1 Tab by mouth daily.  sertraline (ZOLOFT) 50 mg tablet Take 50 mg by mouth daily.       bimatoprost (LUMIGAN) 0.01 % ophthalmic drops Administer 1 Drop to both eyes every evening.  brimonidine-timolol (COMBIGAN) 0.2-0.5 % drop ophthalmic solution 1 Drop every twelve (12) hours.  levothyroxine (SYNTHROID) 100 mcg tablet Take 100 mcg by mouth Daily (before breakfast).  omeprazole (PRILOSEC) 20 mg capsule Take 20 mg by mouth daily.  calcium-cholecalciferol, d3, (CALCIUM 600 + D) 600-125 mg-unit tab Take  by mouth. Past History     Past Medical History:  Past Medical History:   Diagnosis Date    Cancer ([facility] [address]     Diverticulitis     Hypertension     Hypothyroid        Past Surgical History:  Past Surgical History:   Procedure Laterality Date    HX MASTECTOMY      right    HX ORTHOPAEDIC      shoulder       Family History:  History reviewed. No pertinent family history. Social History:  Social History     Tobacco Use    Smoking status: Never Smoker    Smokeless tobacco: Never Used   Substance Use Topics    Alcohol use: No    Drug use: Not on file       Allergies: Allergies   Allergen Reactions    Levaquin [Levofloxacin] Other (comments)     Tremors       Pcn [Penicillins] Rash       I reviewed and confirmed the above information with patient and updated as necessary. Review of Systems     Review of Systems   Constitutional: Negative for diaphoresis and fever. HENT: Negative for ear pain and sore throat. Eyes:        No acute change in vision   Respiratory: Negative for cough and shortness of breath. Cardiovascular: Positive for leg swelling. Negative for chest pain. Gastrointestinal: Negative for abdominal pain and vomiting. Genitourinary: Negative for dysuria. Musculoskeletal: Negative for neck pain. Skin: Negative for wound. Neurological: Negative for weakness and headaches.        Physical Exam     Visit Vitals  BP (!) 186/97   Pulse 99   Temp 97.3 °F (36.3 °C)   Resp 19   Ht 5' 1\" (1.549 m)   Wt 68 kg (150 lb)   SpO2 99%   BMI 28.34 kg/m²       Physical Exam  Vitals and nursing note reviewed. Constitutional:       Comments: Elderly adult female resting comfortably, nondistressed. HENT:      Mouth/Throat:      Mouth: Mucous membranes are moist.   Eyes:      Pupils: Pupils are equal, round, and reactive to light. Cardiovascular:      Rate and Rhythm: Normal rate and regular rhythm. Pulses: Normal pulses. Pulmonary:      Effort: Pulmonary effort is normal.      Breath sounds: Normal breath sounds. Abdominal:      Palpations: Abdomen is soft. Tenderness: There is no abdominal tenderness. Musculoskeletal:         General: Swelling (1+ pitting edema to bilateral ankles) present. No signs of injury. Normal range of motion. Cervical back: Normal range of motion. Skin:     General: Skin is warm. Neurological:      General: No focal deficit present. Mental Status: She is alert and oriented to person, place, and time. Mental status is at baseline. Diagnostic Study Results     Labs -        Radiologic Studies -   XR CHEST PORT   Final Result      No acute radiographic cardiopulmonary abnormality. Medical Decision Making   I am the first provider for this patient. I reviewed the vital signs, available nursing notes, past medical history, past surgical history, family history and social history. Vital Signs-Reviewed the patient's vital signs. EKG: Appears to be sinus mechanism with multiple PVCs, apparent on the monitor as well    Records Reviewed: Nursing Notes and Old Medical Records (Time of Review: 2:22 PM)    Provider Notes (Medical Decision Making):   MDM  80year-old female here with bilateral ankle swelling, hypertension. Likely benign essential hypertension, no evidence of hypertensive emergency or urgency. Doubt ACS, pneumonia. With ankle swelling could potentially be having some mild symptoms of heart failure but patient is not hypoxic.     ED Course: Progress Notes, Reevaluation, and Consults:  Patient has afebrile, hypertensive, high systolic blood pressure of 055 diastolic about 609, otherwise hemodynamically normal  Resting comfortably, nondistressed, mild pitting edema of the ankles    Discussed care plan with the patient and family, will give her initial dose of Norvasc 5 mg and check her blood pressure regularly. 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 CBC, CMP are unremarkable  Magnesium is slightly low at 1.8, will need a delta troponin as her initial troponin was 21, EKG nondiagnostic for ischemia  NT proBNP slightly elevated at 2500, could have some mild sequelae of heart failure but patient is not hypoxic, does not need admission for heart failure at this time  Chest x-ray is negative without pulmonary edema    Discussed all above findings with the patient and patient's daughter, they will need to follow-up with her cardiologist for further management of her symptoms and her blood pressure. She will take her Norvasc at home, blood pressure is now 180s over 90s. Patient signed out to Dr. Fabián Orta pending 2-hour delta troponin          ED Course as of 03/22/22 1558   Tue Mar 22, 2022   1539 Care was endorsed to me at the usual change of shift by Dr Lewis Pérez. Patient presented to the emergency department complaints of elevated blood pressure and leg swelling. Patient currently pending repeat high-sensitivity troponin which is scheduled for 3:50 PM. [EDUAR]      ED Course User Index  [EDUAR] Roddy Cadena DO         Procedures  Diagnosis     Clinical Impression:   1. Essential hypertension    2. Asymptomatic PVCs    3.  Ankle swelling, unspecified laterality        Disposition: TBD    Follow-up Information     Follow up With Specialties Details Why [facility]    THE [facility] EMERGENCY DEPT Emergency Medicine  As needed, If symptoms worsen 2 Kel Stout [facility] Medical [facility], Dmitry Chew DO Internal Medicine Schedule an appointment as soon as possible for a visit   Luis Jenkins [facility] 2. [phone]             Patient's Medications   Start Taking    No medications on file   Continue Taking    BIMATOPROST (LUMIGAN) 0.01 % OPHTHALMIC DROPS    Administer 1 Drop to both eyes every evening. BRIMONIDINE-TIMOLOL (COMBIGAN) 0.2-0.5 % DROP OPHTHALMIC SOLUTION    1 Drop every twelve (12) hours. CALCIUM-CHOLECALCIFEROL, D3, (CALCIUM 600 + D) 600-125 MG-UNIT TAB    Take  by mouth. LEVOTHYROXINE (SYNTHROID) 100 MCG TABLET    Take 100 mcg by mouth Daily (before breakfast). LIDOCAINE 4 % PATCH    1 Patch by TransDERmal route every twelve (12) hours every twelve (12) hours. OMEPRAZOLE (PRILOSEC) 20 MG CAPSULE    Take 20 mg by mouth daily. SERTRALINE (ZOLOFT) 50 MG TABLET    Take 50 mg by mouth daily. VALSARTAN-HYDROCHLOROTHIAZIDE (DIOVAN-HCT) 160-25 MG PER TABLET    Take 1 Tab by mouth daily. These Medications have changed    No medications on file   Stop Taking    No medications on file       Antoni Pinedo MD   Emergency Medicine   March 22, 2022, 2:22 PM     This note is dictated utilizing Dragon voice recognition software. Unfortunately this leads to occasional typographical errors using the voice recognition. I apologize in advance if the situation occurs. If questions occur please do not hesitate to contact me directly.     Michelle Stokes MD

## 2022-03-29 LAB
CALCULATED R AXIS, ECG10: -62 DEGREES
CALCULATED T AXIS, ECG11: 24 DEGREES
DIAGNOSIS, 93000: NORMAL
Q-T INTERVAL, ECG07: 396 MS
QRS DURATION, ECG06: 116 MS
QTC CALCULATION (BEZET), ECG08: 520 MS
VENTRICULAR RATE, ECG03: 104 BPM

## 2023-01-02 ENCOUNTER — APPOINTMENT (OUTPATIENT)
Dept: VASCULAR SURGERY | Age: 88
DRG: 291 | End: 2023-01-02
Attending: PHYSICIAN ASSISTANT
Payer: MEDICARE

## 2023-01-02 ENCOUNTER — APPOINTMENT (OUTPATIENT)
Dept: GENERAL RADIOLOGY | Age: 88
DRG: 291 | End: 2023-01-02
Attending: EMERGENCY MEDICINE
Payer: MEDICARE

## 2023-01-02 ENCOUNTER — HOSPITAL ENCOUNTER (INPATIENT)
Age: 88
LOS: 10 days | Discharge: HOME | DRG: 291 | End: 2023-01-12
Attending: EMERGENCY MEDICINE | Admitting: FAMILY MEDICINE
Payer: MEDICARE

## 2023-01-02 ENCOUNTER — APPOINTMENT (OUTPATIENT)
Dept: CT IMAGING | Age: 88
DRG: 291 | End: 2023-01-02
Attending: PHYSICIAN ASSISTANT
Payer: MEDICARE

## 2023-01-02 DIAGNOSIS — I10 PRIMARY HYPERTENSION: ICD-10-CM

## 2023-01-02 DIAGNOSIS — R60.9 PITTING EDEMA: ICD-10-CM

## 2023-01-02 DIAGNOSIS — I50.9 ACUTE CONGESTIVE HEART FAILURE, UNSPECIFIED HEART FAILURE TYPE (HCC): Primary | ICD-10-CM

## 2023-01-02 DIAGNOSIS — I35.0 AORTIC VALVE STENOSIS, ETIOLOGY OF CARDIAC VALVE DISEASE UNSPECIFIED: ICD-10-CM

## 2023-01-02 PROBLEM — I48.91 NEW ONSET ATRIAL FIBRILLATION (HCC): Status: ACTIVE | Noted: 2023-01-02

## 2023-01-02 PROBLEM — E83.42 HYPOMAGNESEMIA: Status: ACTIVE | Noted: 2023-01-02

## 2023-01-02 PROBLEM — E03.9 HYPOTHYROIDISM: Status: ACTIVE | Noted: 2023-01-02

## 2023-01-02 LAB
ALBUMIN SERPL-MCNC: 3.6 G/DL (ref 3.4–5)
ALBUMIN/GLOB SERPL: 1.2 (ref 0.8–1.7)
ALP SERPL-CCNC: 113 U/L (ref 45–117)
ALT SERPL-CCNC: 28 U/L (ref 13–56)
ANION GAP SERPL CALC-SCNC: 7 MMOL/L (ref 3–18)
AST SERPL-CCNC: 26 U/L (ref 10–38)
BASOPHILS # BLD: 0 K/UL (ref 0–0.1)
BASOPHILS NFR BLD: 0 % (ref 0–2)
BILIRUB SERPL-MCNC: 0.5 MG/DL (ref 0.2–1)
BNP SERPL-MCNC: 2379 PG/ML (ref 0–1800)
BUN SERPL-MCNC: 15 MG/DL (ref 7–18)
BUN/CREAT SERPL: 16 (ref 12–20)
CALCIUM SERPL-MCNC: 8.6 MG/DL (ref 8.5–10.1)
CHLORIDE SERPL-SCNC: 104 MMOL/L (ref 100–111)
CO2 SERPL-SCNC: 27 MMOL/L (ref 21–32)
CREAT SERPL-MCNC: 0.92 MG/DL (ref 0.6–1.3)
D DIMER PPP FEU-MCNC: 1.5 UG/ML(FEU)
DIFFERENTIAL METHOD BLD: ABNORMAL
EOSINOPHIL # BLD: 0.5 K/UL (ref 0–0.4)
EOSINOPHIL NFR BLD: 5 % (ref 0–5)
ERYTHROCYTE [DISTWIDTH] IN BLOOD BY AUTOMATED COUNT: 13.3 % (ref 11.6–14.5)
FLUAV RNA SPEC QL NAA+PROBE: NOT DETECTED
FLUBV RNA SPEC QL NAA+PROBE: NOT DETECTED
GLOBULIN SER CALC-MCNC: 2.9 G/DL (ref 2–4)
GLUCOSE SERPL-MCNC: 101 MG/DL (ref 74–99)
HCT VFR BLD AUTO: 35.8 % (ref 35–45)
HGB BLD-MCNC: 11.6 G/DL (ref 12–16)
IMM GRANULOCYTES # BLD AUTO: 0.1 K/UL (ref 0–0.04)
IMM GRANULOCYTES NFR BLD AUTO: 1 % (ref 0–0.5)
LYMPHOCYTES # BLD: 1.3 K/UL (ref 0.9–3.6)
LYMPHOCYTES NFR BLD: 14 % (ref 21–52)
MAGNESIUM SERPL-MCNC: 1.4 MG/DL (ref 1.6–2.6)
MCH RBC QN AUTO: 32 PG (ref 24–34)
MCHC RBC AUTO-ENTMCNC: 32.4 G/DL (ref 31–37)
MCV RBC AUTO: 98.6 FL (ref 78–100)
MONOCYTES # BLD: 0.9 K/UL (ref 0.05–1.2)
MONOCYTES NFR BLD: 10 % (ref 3–10)
NEUTS SEG # BLD: 6.3 K/UL (ref 1.8–8)
NEUTS SEG NFR BLD: 70 % (ref 40–73)
NRBC # BLD: 0 K/UL (ref 0–0.01)
NRBC BLD-RTO: 0 PER 100 WBC
PLATELET # BLD AUTO: 335 K/UL (ref 135–420)
PMV BLD AUTO: 9.8 FL (ref 9.2–11.8)
POTASSIUM SERPL-SCNC: 3.5 MMOL/L (ref 3.5–5.5)
PROT SERPL-MCNC: 6.5 G/DL (ref 6.4–8.2)
RBC # BLD AUTO: 3.63 M/UL (ref 4.2–5.3)
SARS-COV-2, COV2: NOT DETECTED
SODIUM SERPL-SCNC: 138 MMOL/L (ref 136–145)
TROPONIN-HIGH SENSITIVITY: 24 NG/L (ref 0–54)
TROPONIN-HIGH SENSITIVITY: 29 NG/L (ref 0–54)
TSH SERPL DL<=0.05 MIU/L-ACNC: 6.23 UIU/ML (ref 0.36–3.74)
WBC # BLD AUTO: 9.1 K/UL (ref 4.6–13.2)

## 2023-01-02 PROCEDURE — 93971 EXTREMITY STUDY: CPT

## 2023-01-02 PROCEDURE — 71275 CT ANGIOGRAPHY CHEST: CPT

## 2023-01-02 PROCEDURE — 71045 X-RAY EXAM CHEST 1 VIEW: CPT

## 2023-01-02 PROCEDURE — 74011250637 HC RX REV CODE- 250/637: Performed by: FAMILY MEDICINE

## 2023-01-02 PROCEDURE — 96374 THER/PROPH/DIAG INJ IV PUSH: CPT

## 2023-01-02 PROCEDURE — 84484 ASSAY OF TROPONIN QUANT: CPT

## 2023-01-02 PROCEDURE — 85025 COMPLETE CBC W/AUTO DIFF WBC: CPT

## 2023-01-02 PROCEDURE — 65270000046 HC RM TELEMETRY

## 2023-01-02 PROCEDURE — 83880 ASSAY OF NATRIURETIC PEPTIDE: CPT

## 2023-01-02 PROCEDURE — 85379 FIBRIN DEGRADATION QUANT: CPT

## 2023-01-02 PROCEDURE — 84443 ASSAY THYROID STIM HORMONE: CPT

## 2023-01-02 PROCEDURE — 74011250637 HC RX REV CODE- 250/637: Performed by: PHYSICIAN ASSISTANT

## 2023-01-02 PROCEDURE — 87636 SARSCOV2 & INF A&B AMP PRB: CPT

## 2023-01-02 PROCEDURE — 99285 EMERGENCY DEPT VISIT HI MDM: CPT

## 2023-01-02 PROCEDURE — 74011000636 HC RX REV CODE- 636: Performed by: EMERGENCY MEDICINE

## 2023-01-02 PROCEDURE — 93005 ELECTROCARDIOGRAM TRACING: CPT

## 2023-01-02 PROCEDURE — 74011250636 HC RX REV CODE- 250/636: Performed by: FAMILY MEDICINE

## 2023-01-02 PROCEDURE — 94761 N-INVAS EAR/PLS OXIMETRY MLT: CPT

## 2023-01-02 PROCEDURE — 80053 COMPREHEN METABOLIC PANEL: CPT

## 2023-01-02 PROCEDURE — 74011250636 HC RX REV CODE- 250/636: Performed by: PHYSICIAN ASSISTANT

## 2023-01-02 PROCEDURE — 83735 ASSAY OF MAGNESIUM: CPT

## 2023-01-02 RX ORDER — ONDANSETRON 4 MG/1
4 TABLET, ORALLY DISINTEGRATING ORAL
Status: DISCONTINUED | OUTPATIENT
Start: 2023-01-02 | End: 2023-01-12 | Stop reason: HOSPADM

## 2023-01-02 RX ORDER — BENZONATATE 100 MG/1
100 CAPSULE ORAL
Status: COMPLETED | OUTPATIENT
Start: 2023-01-02 | End: 2023-01-02

## 2023-01-02 RX ORDER — POLYETHYLENE GLYCOL 3350 17 G/17G
17 POWDER, FOR SOLUTION ORAL DAILY PRN
Status: DISCONTINUED | OUTPATIENT
Start: 2023-01-02 | End: 2023-01-12 | Stop reason: HOSPADM

## 2023-01-02 RX ORDER — FUROSEMIDE 10 MG/ML
40 INJECTION INTRAMUSCULAR; INTRAVENOUS
Status: COMPLETED | OUTPATIENT
Start: 2023-01-02 | End: 2023-01-02

## 2023-01-02 RX ORDER — ENOXAPARIN SODIUM 100 MG/ML
40 INJECTION SUBCUTANEOUS DAILY
Status: DISCONTINUED | OUTPATIENT
Start: 2023-01-03 | End: 2023-01-02

## 2023-01-02 RX ORDER — PANTOPRAZOLE SODIUM 40 MG/1
40 TABLET, DELAYED RELEASE ORAL
Status: DISCONTINUED | OUTPATIENT
Start: 2023-01-03 | End: 2023-01-12 | Stop reason: HOSPADM

## 2023-01-02 RX ORDER — SODIUM CHLORIDE 0.9 % (FLUSH) 0.9 %
5-40 SYRINGE (ML) INJECTION AS NEEDED
Status: DISCONTINUED | OUTPATIENT
Start: 2023-01-02 | End: 2023-01-12 | Stop reason: HOSPADM

## 2023-01-02 RX ORDER — ACETAMINOPHEN 650 MG/1
650 SUPPOSITORY RECTAL
Status: DISCONTINUED | OUTPATIENT
Start: 2023-01-02 | End: 2023-01-12 | Stop reason: HOSPADM

## 2023-01-02 RX ORDER — ENOXAPARIN SODIUM 100 MG/ML
1 INJECTION SUBCUTANEOUS DAILY
Status: DISCONTINUED | OUTPATIENT
Start: 2023-01-03 | End: 2023-01-09

## 2023-01-02 RX ORDER — ACETAMINOPHEN 325 MG/1
650 TABLET ORAL
Status: DISCONTINUED | OUTPATIENT
Start: 2023-01-02 | End: 2023-01-12 | Stop reason: HOSPADM

## 2023-01-02 RX ORDER — MAGNESIUM SULFATE HEPTAHYDRATE 40 MG/ML
4 INJECTION, SOLUTION INTRAVENOUS ONCE
Status: COMPLETED | OUTPATIENT
Start: 2023-01-02 | End: 2023-01-02

## 2023-01-02 RX ORDER — LEVOTHYROXINE SODIUM 100 UG/1
100 TABLET ORAL
Status: DISCONTINUED | OUTPATIENT
Start: 2023-01-03 | End: 2023-01-12 | Stop reason: HOSPADM

## 2023-01-02 RX ORDER — FERROUS SULFATE, DRIED 160(50) MG
1 TABLET, EXTENDED RELEASE ORAL DAILY
Status: DISCONTINUED | OUTPATIENT
Start: 2023-01-03 | End: 2023-01-12 | Stop reason: HOSPADM

## 2023-01-02 RX ORDER — LANOLIN ALCOHOL/MO/W.PET/CERES
250 CREAM (GRAM) TOPICAL DAILY
Status: DISCONTINUED | OUTPATIENT
Start: 2023-01-03 | End: 2023-01-12 | Stop reason: HOSPADM

## 2023-01-02 RX ORDER — AMLODIPINE BESYLATE 10 MG/1
2.5 TABLET ORAL
COMMUNITY
End: 2023-01-12

## 2023-01-02 RX ORDER — SODIUM CHLORIDE 0.9 % (FLUSH) 0.9 %
5-40 SYRINGE (ML) INJECTION EVERY 8 HOURS
Status: DISCONTINUED | OUTPATIENT
Start: 2023-01-03 | End: 2023-01-12 | Stop reason: HOSPADM

## 2023-01-02 RX ORDER — FUROSEMIDE 10 MG/ML
40 INJECTION INTRAMUSCULAR; INTRAVENOUS 2 TIMES DAILY
Status: DISCONTINUED | OUTPATIENT
Start: 2023-01-03 | End: 2023-01-09

## 2023-01-02 RX ORDER — BENZONATATE 100 MG/1
100 CAPSULE ORAL
Status: DISCONTINUED | OUTPATIENT
Start: 2023-01-02 | End: 2023-01-12 | Stop reason: HOSPADM

## 2023-01-02 RX ORDER — VALSARTAN 80 MG/1
80 TABLET ORAL DAILY
Status: DISCONTINUED | OUTPATIENT
Start: 2023-01-03 | End: 2023-01-04

## 2023-01-02 RX ORDER — ONDANSETRON 2 MG/ML
4 INJECTION INTRAMUSCULAR; INTRAVENOUS
Status: DISCONTINUED | OUTPATIENT
Start: 2023-01-02 | End: 2023-01-12 | Stop reason: HOSPADM

## 2023-01-02 RX ADMIN — POTASSIUM BICARBONATE 40 MEQ: 782 TABLET, EFFERVESCENT ORAL at 20:12

## 2023-01-02 RX ADMIN — FUROSEMIDE 40 MG: 10 INJECTION, SOLUTION INTRAMUSCULAR; INTRAVENOUS at 17:01

## 2023-01-02 RX ADMIN — MAGNESIUM SULFATE HEPTAHYDRATE 4 G: 40 INJECTION, SOLUTION INTRAVENOUS at 20:12

## 2023-01-02 RX ADMIN — IOPAMIDOL 100 ML: 755 INJECTION, SOLUTION INTRAVENOUS at 17:28

## 2023-01-02 RX ADMIN — BENZONATATE 100 MG: 100 CAPSULE ORAL at 17:01

## 2023-01-02 NOTE — ED NOTES
Patient not on Cardiac monitor.  Patient currently in [place] bed due to ED monitor beds unavailable at this time

## 2023-01-02 NOTE — ED PROVIDER NOTES
EMERGENCY DEPARTMENT HISTORY & PHYSICAL EXAM    THE [facility] EMERGENCY DEPT  1/2/2023, 3:07 PM    Clinical Impression:  1. Acute congestive heart failure, unspecified heart failure type ([address]    2. Pitting edema    3. Aortic valve stenosis, etiology of cardiac valve disease unspecified    4. Primary hypertension        Assessment/Differential Diagnosis:     Ddx cardiac event, dehydration, electrolyte abnormality, COVID, flu, pneumonia, viral illness, bacterial illness, PE, CHF, all considered    ED Course:   Initial assessment performed. The patients presenting problems have been discussed, and they are in agreement with the care plan formulated and outlined with them. I have encouraged them to ask questions as they arise throughout their visit. Pt here with daughters with several days worsening SOB, cough, orthopnea, exertional SOB, fatigue, extremity swelling, L>R. No fever, rhinorrhea, ST, productive cough. No abd symptoms. H/o HTN, AS, with no history a fib, CHF. Exam with pt appearing slight resp distress with persistent dry cough, worse with lying flat. Vitals wnl. + slt exp wheeze on left, faint crackles bilat. Heart with irreg irreg rhythm, + systolic murmur. + pitting edema to knees bilat, L>R with calf tenderness on left. Workup with cxr c/w pulm edema, EKG with PVCs, no obvious a fib, ST changes, regularly irreg rhythm. . EMS rhythm with PVCs runs of 3. Labs with elevated BNP, troponin normal, ddimer elevated. Venous doppler neg left leg  CTA chest neg for PE, + pulm edema  40mg lasix IV given with good urine output, cough improved    7:12 PM  Reviewed workup with pt and family. Pt vitals with RR 27-31, continued pitting edema to legs, lungs CTA  Will consult hospitalist for admission    7:27 PM  Discussed with Dr. Jordy Bradley, hospitalist, who will admit. No further evaluation, or treatment requested.             Medical Chart Review:  I have reviewed triage nursing documentation. Review of old medical records with the following pertinent information:       Disposition: admit. Chief Complaint   Patient presents with    Fatigue    Cough     HPI:    The history is provided by patient and daughters. No  used. Cheryle Sebastian is a 80 y.o. female presenting to the Emergency Department with complaints of worsening dry cough, SOB, extremity swelling, orthopnea and today increased fatigue and unsteadiness on feet. Became concerned because patient is in assisted living. For that reason she was brought to the ED for evaluation. There is been no fever, chills, myalgia, rhinorrhea, sore throat. She does have dry cough with no production. She denies any chest pain. She is exhibiting some shortness of breath worse when she lays flat or attempts to move around in her apartment. She is also noticed some swelling to her lower extremities that is new over the course of the last week. Left leg more swollen than right. Some calf tenderness noted. No history of PE, DVT. Patient does have history of hypertension and aortic stenosis. She has declined surgery in the past.  No history of CHF, A. fib. She is on no anticoagulation      I have reviewed all PMHX, FMHX and Social Hx as entered into the medical record in the chart below using the Epic Template. Review of Systems:  Constitutional: neg for fever, chills  ENT:  neg for URI symptoms  Respiratory:  + for cough, positive for shortness of breath  Cardiovascular:  negative for chest pain. + for pedal/LE edema. GI:  neg for abdominal pain. No n/v/d.  :  No urinary symptoms. No Flank pain. MSK: neg for back pain. Integumentary: no rashes, or skin trauma  Neurological: neg for headaches  All other systems reviewed negative with exception of positives in ROS and HPI.     Past Medical History:  Past Medical History:   Diagnosis Date    Cancer ([facility] [address]     Diverticulitis     Hypertension Hypothyroid        Past Surgical History:  Past Surgical History:   Procedure Laterality Date    HX MASTECTOMY      right    HX ORTHOPAEDIC      shoulder       Family History:  Family History   Problem Relation Age of Onset    Heart Disease Mother     Heart Disease Father        Social History:  Social History     Tobacco Use    Smoking status: Never    Smokeless tobacco: Never   Substance Use Topics    Alcohol use: No       Allergies: Allergies   Allergen Reactions    Levaquin [Levofloxacin] Other (comments)     Tremors       Pcn [Penicillins] Rash       Vital Signs:  Vitals:    01/02/23 1709 01/02/23 1743 01/02/23 1825 01/02/23 1910   BP: (!) 169/72 126/75 (!) 135/59    Pulse: 78 83 72 77   Resp: 26 13 24 24   Temp:       SpO2: 95% 95%  97%     Physical Exam:  Vital Signs Reviewed. Nursing Notes Reviewed. Constitutional:  Well developed, well nourished patient. Appearance and behavior are age and situation appropriate. Pt appears slt resp distress, persistent dry cough, tachypnea,   Head: Normocephalic, Atraumatic  Eyes: Conjunctiva clear, lids normal. Sclera anicteric. ENT:hearing grossly intact, throat clear   Neck:  supple, FROM, no meningismus, nontender  Lungs: slight respiratory distress. Lungs faint rales bilat, late left sided exp wheeze. + dry cough. no pain with inspiration  CV: irreg rhythm, + systolic murmur  Thorax: no chest wall tenderness. No signs of trauma. Skin without rash. Abdomen: no tenderness with palpation. BS normal throughout. No organomegaly appreciated. No mass. Extremities:  no tenderness with palpation to UE. + tender left post calf. .  ++ pitting pedal edema, bilat to knees. Feet warm, nontender, sensation intact. Neuro:  A&O x 3. CN II-XII grossly intact. No gross neuro deficits. Skin:  Warm, dry, no rash. Skin intact.      Diagnostics:    Labs -     Recent Results (from the past 12 hour(s))   EKG, 12 LEAD, INITIAL    Collection Time: 01/02/23  1:44 PM   Result Value Ref Range    Ventricular Rate 97 BPM    QRS Duration 110 ms    Q-T Interval 394 ms    QTC Calculation (Bezet) 500 ms    Calculated R Axis 89 degrees    Calculated T Axis -92 degrees    Diagnosis       Atrial fibrillation with a competing junctional pacemaker with premature   ventricular or aberrantly conducted complexes  Septal infarct , age undetermined  Marked ST abnormality, possible inferolateral subendocardial injury  Abnormal ECG  When compared with ECG of 22-MAR-2022 13:25,  Atrial fibrillation has replaced Sinus rhythm  Right bundle branch block is no longer present  Septal infarct is now present     CBC WITH AUTOMATED DIFF    Collection Time: 01/02/23  1:45 PM   Result Value Ref Range    WBC 9.1 4.6 - 13.2 K/uL    RBC 3.63 (L) 4.20 - 5.30 M/uL    HGB 11.6 (L) 12.0 - 16.0 g/dL    HCT 35.8 35.0 - 45.0 %    MCV 98.6 78.0 - 100.0 FL    MCH 32.0 24.0 - 34.0 PG    MCHC 32.4 31.0 - 37.0 g/dL    RDW 13.3 11.6 - 14.5 %    PLATELET 400 100 - 491 K/uL    MPV 9.8 9.2 - 11.8 FL    NRBC 0.0 0  WBC    ABSOLUTE NRBC 0.00 0.00 - 0.01 K/uL    NEUTROPHILS 70 40 - 73 %    LYMPHOCYTES 14 (L) 21 - 52 %    MONOCYTES 10 3 - 10 %    EOSINOPHILS 5 0 - 5 %    BASOPHILS 0 0 - 2 %    IMMATURE GRANULOCYTES 1 (H) 0.0 - 0.5 %    ABS. NEUTROPHILS 6.3 1.8 - 8.0 K/UL    ABS. LYMPHOCYTES 1.3 0.9 - 3.6 K/UL    ABS. MONOCYTES 0.9 0.05 - 1.2 K/UL    ABS. EOSINOPHILS 0.5 (H) 0.0 - 0.4 K/UL    ABS. BASOPHILS 0.0 0.0 - 0.1 K/UL    ABS. IMM.  GRANS. 0.1 (H) 0.00 - 0.04 K/UL    DF AUTOMATED     METABOLIC PANEL, COMPREHENSIVE    Collection Time: 01/02/23  1:45 PM   Result Value Ref Range    Sodium 138 136 - 145 mmol/L    Potassium 3.5 3.5 - 5.5 mmol/L    Chloride 104 100 - 111 mmol/L    CO2 27 21 - 32 mmol/L    Anion gap 7 3.0 - 18 mmol/L    Glucose 101 (H) 74 - 99 mg/dL    BUN 15 7.0 - 18 MG/DL    Creatinine 0.92 0.6 - 1.3 MG/DL    BUN/Creatinine ratio 16 12 - 20      eGFR 57 (L) >60 ml/min/1.73m2    Calcium 8.6 8.5 - 10.1 MG/DL    Bilirubin, total 0.5 0.2 - 1.0 MG/DL    ALT (SGPT) 28 13 - 56 U/L    AST (SGOT) 26 10 - 38 U/L    Alk. phosphatase 113 45 - 117 U/L    Protein, total 6.5 6.4 - 8.2 g/dL    Albumin 3.6 3.4 - 5.0 g/dL    Globulin 2.9 2.0 - 4.0 g/dL    A-G Ratio 1.2 0.8 - 1.7     NT-PRO BNP    Collection Time: 01/02/23  1:45 PM   Result Value Ref Range    NT pro-BNP 2,379 (H) 0 - 1,800 PG/ML   TROPONIN-HIGH SENSITIVITY    Collection Time: 01/02/23  1:45 PM   Result Value Ref Range    Troponin-High Sensitivity 24 0 - 54 ng/L   D DIMER    Collection Time: 01/02/23  1:45 PM   Result Value Ref Range    D DIMER 1.50 (H) <0.46 ug/ml(FEU)   COVID-19 WITH INFLUENZA A/B    Collection Time: 01/02/23  3:51 PM   Result Value Ref Range    SARS-CoV-2 by PCR Not detected NOTD      Influenza A by PCR Not detected NOTD      Influenza B by PCR Not detected NOTD         Radiologic Studies -   CTA CHEST W OR W WO CONT   Final Result      1. No evidence of pulmonary embolism. 2. Mosaic attenuation patchy groundglass and septal thickening may reflect   combination of pulmonary edema along with small airways disease. No focal   consolidation. 3. Trace left pleural effusion. XR CHEST PORT    (Results Pending)   DUPLEX LOWER EXT VENOUS LEFT    (Results Pending)     CT Results  (Last 48 hours)                 01/02/23 1737  CTA CHEST W OR W WO CONT Final result    Impression:      1. No evidence of pulmonary embolism. 2. Mosaic attenuation patchy groundglass and septal thickening may reflect   combination of pulmonary edema along with small airways disease. No focal   consolidation. 3. Trace left pleural effusion.            Narrative:  EXAM: CTA CHEST W OR W WO CONT       CLINICAL INDICATION/HISTORY: cough, SOB, elevated ddimer, left leg swelling       COMPARISON: Chest x-ray dated March 22, 2022 and chest CT dated September 19, 2020       TECHNIQUE: Axial CT imaging from the thoracic inlet through the diaphragm with   intravenous contrast. Coronal and sagittal MIP reformats were generated. One or   more dose reduction techniques were used on this CT: automated exposure control,   adjustment of the mAs and/or kVp according to patient size, and iterative   reconstruction techniques. The specific techniques used on this CT exam have   been documented in the patient's electronic medical record. Digital Imaging and   Communications in Medicine (DICOM) format image data are available to   nonaffiliated external healthcare facilities or entities on a secure, media   free, reciprocally searchable basis with patient authorization for at least a   12-month period after this study. _______________       FINDINGS:       EXAM QUALITY: Adequate for diagnosis. PULMONARY ARTERIES: No evidence of pulmonary embolism. MEDIASTINUM: Heart is enlarged. No pericardial effusion. Mitral annular   calcifications are noted. LUNGS: Mosaic attenuation pattern with patchy areas of groundglass and septal   thickening throughout the lungs is now seen. PLEURA: Trace left pleural effusion. AIRWAY: Mild scattered bronchial wall thickening with areas of mucous plugging. LYMPH NODES: No enlarged nodes. UPPER ABDOMEN: Status post cholecystectomy. Colonic diverticulosis. OTHER: Left shoulder arthroplasty hardware noted. Advanced degenerative changes   of the right shoulder.  Prior vertebral augmentation at L3.       _______________                 CXR Results  (Last 48 hours)      None            Medications given in the ED-  Medications   potassium bicarb-citric acid (EFFER-K) tablet 40 mEq (has no administration in time range)   furosemide (LASIX) injection 40 mg (40 mg IntraVENous Given 1/2/23 1701)   benzonatate (TESSALON) capsule 100 mg (100 mg Oral Given 1/2/23 1701)   iopamidoL (ISOVUE-370) 370 mg iodine /mL (76 %) injection 100 mL (100 mL IntraVENous Given 1/2/23 1728)       Please note that this dictation was completed with Justin the computer voice recognition software. Quite often unanticipated grammatical, syntax, homophones, and other interpretive errors are inadvertently transcribed by the computer software. Please disregard these errors. Please excuse any errors that have escaped final proofreading.

## 2023-01-03 ENCOUNTER — APPOINTMENT (OUTPATIENT)
Dept: NON INVASIVE DIAGNOSTICS | Age: 88
DRG: 291 | End: 2023-01-03
Attending: FAMILY MEDICINE
Payer: MEDICARE

## 2023-01-03 LAB
ALBUMIN SERPL-MCNC: 3.2 G/DL (ref 3.4–5)
ALBUMIN/GLOB SERPL: 1.2 (ref 0.8–1.7)
ALP SERPL-CCNC: 100 U/L (ref 45–117)
ALT SERPL-CCNC: 22 U/L (ref 13–56)
ANION GAP SERPL CALC-SCNC: 6 MMOL/L (ref 3–18)
APPEARANCE UR: CLEAR
AST SERPL-CCNC: 20 U/L (ref 10–38)
BACTERIA URNS QL MICRO: NEGATIVE /HPF
BILIRUB SERPL-MCNC: 0.6 MG/DL (ref 0.2–1)
BILIRUB UR QL: NEGATIVE
BUN SERPL-MCNC: 13 MG/DL (ref 7–18)
BUN/CREAT SERPL: 17 (ref 12–20)
CALCIUM SERPL-MCNC: 8.5 MG/DL (ref 8.5–10.1)
CALCULATED R AXIS, ECG10: 89 DEGREES
CALCULATED T AXIS, ECG11: -92 DEGREES
CHLORIDE SERPL-SCNC: 104 MMOL/L (ref 100–111)
CO2 SERPL-SCNC: 28 MMOL/L (ref 21–32)
COLOR UR: YELLOW
CREAT SERPL-MCNC: 0.77 MG/DL (ref 0.6–1.3)
DIAGNOSIS, 93000: NORMAL
ECHO AO ROOT DIAM: 2.8 CM
ECHO AO ROOT INDEX: 1.68 CM/M2
ECHO AR MAX VEL PISA: 3 M/S
ECHO AV AREA PEAK VELOCITY: 0.5 CM2
ECHO AV AREA VTI: 0.5 CM2
ECHO AV AREA/BSA PEAK VELOCITY: 0.3 CM2/M2
ECHO AV AREA/BSA VTI: 0.3 CM2/M2
ECHO AV MEAN GRADIENT: 41 MMHG
ECHO AV MEAN VELOCITY: 3.1 M/S
ECHO AV PEAK GRADIENT: 61 MMHG
ECHO AV PEAK VELOCITY: 3.9 M/S
ECHO AV REGURGITANT PHT: 602.7 MILLISECOND
ECHO AV VELOCITY RATIO: 0.21
ECHO AV VTI: 91.3 CM
ECHO EST RA PRESSURE: 3 MMHG
ECHO LA DIAMETER INDEX: 2.1 CM/M2
ECHO LA DIAMETER: 3.5 CM
ECHO LA TO AORTIC ROOT RATIO: 1.25
ECHO LA VOL 2C: 64 ML (ref 22–52)
ECHO LA VOL 4C: 45 ML (ref 22–52)
ECHO LA VOL BP: 55 ML (ref 22–52)
ECHO LA VOL/BSA BIPLANE: 33 ML/M2 (ref 16–34)
ECHO LA VOLUME AREA LENGTH: 58 ML
ECHO LA VOLUME INDEX A2C: 38 ML/M2 (ref 16–34)
ECHO LA VOLUME INDEX A4C: 27 ML/M2 (ref 16–34)
ECHO LA VOLUME INDEX AREA LENGTH: 35 ML/M2 (ref 16–34)
ECHO LV EDV A2C: 42 ML
ECHO LV EDV A4C: 45 ML
ECHO LV EDV BP: 44 ML (ref 56–104)
ECHO LV EDV INDEX A4C: 27 ML/M2
ECHO LV EDV INDEX BP: 26 ML/M2
ECHO LV EDV NDEX A2C: 25 ML/M2
ECHO LV EJECTION FRACTION A2C: 68 %
ECHO LV EJECTION FRACTION A4C: 72 %
ECHO LV EJECTION FRACTION BIPLANE: 70 % (ref 55–100)
ECHO LV ESV A2C: 13 ML
ECHO LV ESV A4C: 13 ML
ECHO LV ESV BP: 13 ML (ref 19–49)
ECHO LV ESV INDEX A2C: 8 ML/M2
ECHO LV ESV INDEX A4C: 8 ML/M2
ECHO LV ESV INDEX BP: 8 ML/M2
ECHO LV FRACTIONAL SHORTENING: 38 % (ref 28–44)
ECHO LV INTERNAL DIMENSION DIASTOLE INDEX: 2.22 CM/M2
ECHO LV INTERNAL DIMENSION DIASTOLIC: 3.7 CM (ref 3.9–5.3)
ECHO LV INTERNAL DIMENSION SYSTOLIC INDEX: 1.38 CM/M2
ECHO LV INTERNAL DIMENSION SYSTOLIC: 2.3 CM
ECHO LV IVSD: 1.4 CM (ref 0.6–0.9)
ECHO LV MASS 2D: 166.5 G (ref 67–162)
ECHO LV MASS INDEX 2D: 99.7 G/M2 (ref 43–95)
ECHO LV POSTERIOR WALL DIASTOLIC: 1.2 CM (ref 0.6–0.9)
ECHO LV RELATIVE WALL THICKNESS RATIO: 0.65
ECHO LVOT AREA: 2.3 CM2
ECHO LVOT AV VTI INDEX: 0.22
ECHO LVOT DIAM: 1.7 CM
ECHO LVOT MEAN GRADIENT: 2 MMHG
ECHO LVOT PEAK GRADIENT: 3 MMHG
ECHO LVOT PEAK VELOCITY: 0.8 M/S
ECHO LVOT STROKE VOLUME INDEX: 27.2 ML/M2
ECHO LVOT SV: 45.4 ML
ECHO LVOT VTI: 20 CM
ECHO PULMONARY ARTERY SYSTOLIC PRESSURE (PASP): 56 MMHG
ECHO PV MAX VELOCITY: 0.9 M/S
ECHO PV PEAK GRADIENT: 3 MMHG
ECHO RIGHT VENTRICULAR SYSTOLIC PRESSURE (RVSP): 56 MMHG
ECHO RV FREE WALL PEAK S': 18 CM/S
ECHO RV INTERNAL DIMENSION: 4.2 CM
ECHO RV TAPSE: 2.3 CM (ref 1.7–?)
ECHO TV REGURGITANT MAX VELOCITY: 3.63 M/S
ECHO TV REGURGITANT PEAK GRADIENT: 53 MMHG
EPITH CASTS URNS QL MICRO: NORMAL /LPF (ref 0–5)
ERYTHROCYTE [DISTWIDTH] IN BLOOD BY AUTOMATED COUNT: 13.3 % (ref 11.6–14.5)
GLOBULIN SER CALC-MCNC: 2.7 G/DL (ref 2–4)
GLUCOSE SERPL-MCNC: 108 MG/DL (ref 74–99)
GLUCOSE UR STRIP.AUTO-MCNC: NEGATIVE MG/DL
HCT VFR BLD AUTO: 34.9 % (ref 35–45)
HGB BLD-MCNC: 11.6 G/DL (ref 12–16)
HGB UR QL STRIP: ABNORMAL
KETONES UR QL STRIP.AUTO: ABNORMAL MG/DL
LEUKOCYTE ESTERASE UR QL STRIP.AUTO: ABNORMAL
MAGNESIUM SERPL-MCNC: 2.6 MG/DL (ref 1.6–2.6)
MCH RBC QN AUTO: 32.5 PG (ref 24–34)
MCHC RBC AUTO-ENTMCNC: 33.2 G/DL (ref 31–37)
MCV RBC AUTO: 97.8 FL (ref 78–100)
NITRITE UR QL STRIP.AUTO: NEGATIVE
NRBC # BLD: 0 K/UL (ref 0–0.01)
NRBC BLD-RTO: 0 PER 100 WBC
PH UR STRIP: 6.5 (ref 5–8)
PLATELET # BLD AUTO: 294 K/UL (ref 135–420)
PMV BLD AUTO: 9.8 FL (ref 9.2–11.8)
POTASSIUM SERPL-SCNC: 3.7 MMOL/L (ref 3.5–5.5)
PROT SERPL-MCNC: 5.9 G/DL (ref 6.4–8.2)
PROT UR STRIP-MCNC: NEGATIVE MG/DL
Q-T INTERVAL, ECG07: 394 MS
QRS DURATION, ECG06: 110 MS
QTC CALCULATION (BEZET), ECG08: 500 MS
RBC # BLD AUTO: 3.57 M/UL (ref 4.2–5.3)
RBC #/AREA URNS HPF: NORMAL /HPF (ref 0–5)
SODIUM SERPL-SCNC: 138 MMOL/L (ref 136–145)
SP GR UR REFRACTOMETRY: 1.02 (ref 1–1.03)
TROPONIN-HIGH SENSITIVITY: 29 NG/L (ref 0–54)
TROPONIN-HIGH SENSITIVITY: 32 NG/L (ref 0–54)
UROBILINOGEN UR QL STRIP.AUTO: 0.2 EU/DL (ref 0.2–1)
VENTRICULAR RATE, ECG03: 97 BPM
WBC # BLD AUTO: 8.9 K/UL (ref 4.6–13.2)
WBC URNS QL MICRO: NORMAL /HPF (ref 0–5)

## 2023-01-03 PROCEDURE — 36415 COLL VENOUS BLD VENIPUNCTURE: CPT

## 2023-01-03 PROCEDURE — 80053 COMPREHEN METABOLIC PANEL: CPT

## 2023-01-03 PROCEDURE — 74011250637 HC RX REV CODE- 250/637: Performed by: FAMILY MEDICINE

## 2023-01-03 PROCEDURE — 93306 TTE W/DOPPLER COMPLETE: CPT

## 2023-01-03 PROCEDURE — 81001 URINALYSIS AUTO W/SCOPE: CPT

## 2023-01-03 PROCEDURE — 84484 ASSAY OF TROPONIN QUANT: CPT

## 2023-01-03 PROCEDURE — 85027 COMPLETE CBC AUTOMATED: CPT

## 2023-01-03 PROCEDURE — 65270000046 HC RM TELEMETRY

## 2023-01-03 PROCEDURE — 83735 ASSAY OF MAGNESIUM: CPT

## 2023-01-03 PROCEDURE — 74011250636 HC RX REV CODE- 250/636: Performed by: FAMILY MEDICINE

## 2023-01-03 PROCEDURE — 74011000250 HC RX REV CODE- 250: Performed by: FAMILY MEDICINE

## 2023-01-03 PROCEDURE — 74011250637 HC RX REV CODE- 250/637: Performed by: INTERNAL MEDICINE

## 2023-01-03 RX ORDER — METOPROLOL TARTRATE 25 MG/1
12.5 TABLET, FILM COATED ORAL EVERY 12 HOURS
Status: DISCONTINUED | OUTPATIENT
Start: 2023-01-03 | End: 2023-01-04

## 2023-01-03 RX ADMIN — VALSARTAN 80 MG: 80 TABLET, FILM COATED ORAL at 00:11

## 2023-01-03 RX ADMIN — ACETAMINOPHEN 650 MG: 325 TABLET ORAL at 03:45

## 2023-01-03 RX ADMIN — SODIUM CHLORIDE, PRESERVATIVE FREE 10 ML: 5 INJECTION INTRAVENOUS at 06:16

## 2023-01-03 RX ADMIN — PANTOPRAZOLE SODIUM 40 MG: 40 TABLET, DELAYED RELEASE ORAL at 10:17

## 2023-01-03 RX ADMIN — LEVOTHYROXINE SODIUM 100 MCG: 0.1 TABLET ORAL at 10:14

## 2023-01-03 RX ADMIN — FUROSEMIDE 40 MG: 10 INJECTION, SOLUTION INTRAMUSCULAR; INTRAVENOUS at 10:15

## 2023-01-03 RX ADMIN — FUROSEMIDE 40 MG: 10 INJECTION, SOLUTION INTRAMUSCULAR; INTRAVENOUS at 20:41

## 2023-01-03 RX ADMIN — SODIUM CHLORIDE, PRESERVATIVE FREE 10 ML: 5 INJECTION INTRAVENOUS at 00:17

## 2023-01-03 RX ADMIN — BENZONATATE 100 MG: 100 CAPSULE ORAL at 17:10

## 2023-01-03 RX ADMIN — ACETAMINOPHEN 650 MG: 325 TABLET ORAL at 20:41

## 2023-01-03 RX ADMIN — Medication 1 TABLET: at 10:14

## 2023-01-03 RX ADMIN — CYANOCOBALAMIN TAB 500 MCG 250 MCG: 500 TAB at 10:14

## 2023-01-03 RX ADMIN — METOPROLOL TARTRATE 12.5 MG: 25 TABLET, FILM COATED ORAL at 20:41

## 2023-01-03 RX ADMIN — SODIUM CHLORIDE, PRESERVATIVE FREE 5 ML: 5 INJECTION INTRAVENOUS at 17:10

## 2023-01-03 NOTE — CONSULTS
[facility] Consult Note      Patient: Cheryle Sebastian MRN: [id number]  SSN: [id number]    YOB: 1927  Age: 80 y.o. Sex: female    Date of Consultation: 1/3/2023    Reason for Consultation: SOB    HPI:  I was asked by Michael Rob to see this pleasant patient for CHF and possible Afib. Cheryle Sebastian is a 80year-old pleasant patient came to the hospital with symptoms of shortness of breath and found to have bleeding possible atrial fibrillation also. Patient known history of aortic valve stenosis does not want TAVR or SAVR. Cardiology was called for further management. Patient is sitting on the side of the bed eating her food not in any distress. Patient clearly said she does not aortic valve intervention. Patient have history of recurrent fall without any known history of bleeding.                     Past Medical History:   Diagnosis Date    Cancer ([facility] [address]     Diverticulitis     Hypertension     Hypothyroid      Past Surgical History:   Procedure Laterality Date    HX MASTECTOMY      right    HX ORTHOPAEDIC      shoulder     Current Facility-Administered Medications   Medication Dose Route Frequency    levothyroxine (SYNTHROID) tablet 100 mcg  100 mcg Oral ACB    pantoprazole (PROTONIX) tablet 40 mg  40 mg Oral ACB    calcium-vitamin D (OS-NOÉ +D3) 500 mg-200 unit per tablet 1 Tablet  1 Tablet Oral DAILY    cyanocobalamin (VITAMIN B12) tablet 250 mcg  250 mcg Oral DAILY    benzonatate (TESSALON) capsule 100 mg  100 mg Oral TID PRN    sodium chloride (NS) flush 5-40 mL  5-40 mL IntraVENous Q8H    sodium chloride (NS) flush 5-40 mL  5-40 mL IntraVENous PRN    acetaminophen (TYLENOL) tablet 650 mg  650 mg Oral Q6H PRN    Or    acetaminophen (TYLENOL) suppository 650 mg  650 mg Rectal Q6H PRN    polyethylene glycol (MIRALAX) packet 17 g  17 g Oral DAILY PRN    ondansetron (ZOFRAN ODT) tablet 4 mg  4 mg Oral Q8H PRN    Or    ondansetron (ZOFRAN) injection 4 mg  4 mg IntraVENous Q6H PRN    furosemide (LASIX) injection 40 mg  40 mg IntraVENous BID    valsartan (DIOVAN) tablet 80 mg  80 mg Oral DAILY    enoxaparin (LOVENOX) injection 70 mg  1 mg/kg SubCUTAneous DAILY       Allergies and Intolerances: Allergies   Allergen Reactions    Levaquin [Levofloxacin] Other (comments)     Tremors       Pcn [Penicillins] Rash       Family History:   Family History   Problem Relation Age of Onset    Heart Disease Mother     Heart Disease Father        Social History:   She  reports that she has never smoked. She has never used smokeless tobacco.  She  reports no history of alcohol use. Review of Systems:     Review of Systems  Gen: No fever, chills, malaise, weight loss/gain. Heent: No headache, rhinorrhea, epistaxis, ear pain, hearing loss, sinus pain, neck pain/stiffness, sore throat. Heart: No chest pain, palpitations, RODRIGUEZ, pnd, or orthopnea. Resp: No cough, hemoptysis, wheezing and +shortness of breath. GI: No nausea, vomiting, diarrhea, constipation, melena or hematochezia. : No urinary obstruction, dysuria or hematuria. Derm: No rash, new skin lesion or pruritis. Musc/skeletal: no bone or joint complains. Vasc: No edema, cyanosis or claudication. Endo: No heat/cold intolerance, no polyuria,polydipsia or polyphagia. Neuro: No unilateral weakness, numbness, tingling. No seizures. Heme: No easy bruising or bleeding. Physical:   Patient Vitals for the past 6 hrs:   Temp Pulse Resp BP SpO2   01/03/23 1633 98.1 °F (36.7 °C) 80 18 (!) 142/57 91 %   01/03/23 1518 -- -- -- (!) 157/69 --   01/03/23 1157 97.7 °F (36.5 °C) 83 18 (!) 144/73 95 %         Exam:   General Appearance: Comfortable, not using accessory muscles of respiration. HEENT: PENNY. HEAD: Atraumatic  NECK: No JVD, no thyroidomeglay.    LUNGS: Bilateral air entry positive  HEART: S1+S2, grade 3 ejection systolic murmur in aortic valve area    ABD: Non-tender, BS Audible    EXT: Trace to mild bilateral edema, and no cysnosis. VASCULAR EXAM: Pulses are intact. PSYCHIATRIC EXAM: Mood is appropriate. MUSCULOSKELETAL: Grossly no joint deformity. NEUROLOGICAL: Motor and sensory sytem intact and Cranial nerves II-XII intact. Review of Data:   LABS:   Lab Results   Component Value Date/Time    WBC 8.9 01/03/2023 02:28 AM    HGB 11.6 (L) 01/03/2023 02:28 AM    HCT 34.9 (L) 01/03/2023 02:28 AM    PLATELET 428 32/11/5936 02:28 AM     Lab Results   Component Value Date/Time    Sodium 138 01/03/2023 02:28 AM    Potassium 3.7 01/03/2023 02:28 AM    Chloride 104 01/03/2023 02:28 AM    CO2 28 01/03/2023 02:28 AM    Glucose 108 (H) 01/03/2023 02:28 AM    BUN 13 01/03/2023 02:28 AM    Creatinine 0.77 01/03/2023 02:28 AM     No results found for: CHOL, CHOLX, CHLST, CHOLV, HDL, HDLP, LDL, LDLC, DLDLP, TGLX, TRIGL, TRIGP  No components found for: GPT  No results found for: HBA1C, IFH0YCLB, NNY7GUVG    RADIOLOGY:  CT Results  (Last 48 hours)                 01/02/23 1737  CTA CHEST W OR W WO CONT Final result    Impression:      1. No evidence of pulmonary embolism. 2. Mosaic attenuation patchy groundglass and septal thickening may reflect   combination of pulmonary edema along with small airways disease. No focal   consolidation. 3. Trace left pleural effusion. Narrative:  EXAM: CTA CHEST W OR W WO CONT       CLINICAL INDICATION/HISTORY: cough, SOB, elevated ddimer, left leg swelling       COMPARISON: Chest x-ray dated March 22, 2022 and chest CT dated September 19, 2020       TECHNIQUE: Axial CT imaging from the thoracic inlet through the diaphragm with   intravenous contrast. Coronal and sagittal MIP reformats were generated. One or   more dose reduction techniques were used on this CT: automated exposure control,   adjustment of the mAs and/or kVp according to patient size, and iterative   reconstruction techniques.   The specific techniques used on this CT exam have   been documented in the patient's electronic medical record. Digital Imaging and   Communications in Medicine (DICOM) format image data are available to   nonaffiliated external healthcare facilities or entities on a secure, media   free, reciprocally searchable basis with patient authorization for at least a   12-month period after this study. _______________       FINDINGS:       EXAM QUALITY: Adequate for diagnosis. PULMONARY ARTERIES: No evidence of pulmonary embolism. MEDIASTINUM: Heart is enlarged. No pericardial effusion. Mitral annular   calcifications are noted. LUNGS: Mosaic attenuation pattern with patchy areas of groundglass and septal   thickening throughout the lungs is now seen. PLEURA: Trace left pleural effusion. AIRWAY: Mild scattered bronchial wall thickening with areas of mucous plugging. LYMPH NODES: No enlarged nodes. UPPER ABDOMEN: Status post cholecystectomy. Colonic diverticulosis. OTHER: Left shoulder arthroplasty hardware noted. Advanced degenerative changes   of the right shoulder. Prior vertebral augmentation at L3.       _______________                 CXR Results  (Last 48 hours)                 01/02/23 1427  XR CHEST PORT Final result    Impression:      Chronic interstitial changes, possibly mild component of interstitial edema. Otherwise, no active cardiopulmonary disease. Narrative:  EXAM: XR CHEST PORT       CLINICAL INDICATION/HISTORY: cough     > Additional: None. COMPARISON: March 22, 2022       TECHNIQUE: Portable chest       _______________       FINDINGS:       SUPPORT DEVICES: None. HEART AND MEDIASTINUM: Normal size and contour. Normal pulmonary vasculature. LUNGS AND PLEURAL SPACES: Chronic reticular interstitial thickening noted,   slightly more pronounced compared to prior studies. No developing consolidation. No effusion or pneumothorax. BONY THORAX AND SOFT TISSUES: No acute osseous abnormality.        _______________ Cardiology Procedures:   Results for orders placed or performed during the hospital encounter of 01/02/23   EKG, 12 LEAD, INITIAL   Result Value Ref Range    Ventricular Rate 97 BPM    QRS Duration 110 ms    Q-T Interval 394 ms    QTC Calculation (Bezet) 500 ms    Calculated R Axis 89 degrees    Calculated T Axis -92 degrees    Diagnosis       Atrial fibrillation with a competing junctional pacemaker with premature   ventricular or aberrantly conducted complexes  Septal infarct , age undetermined  Marked ST abnormality, possible inferolateral subendocardial injury  Abnormal ECG  When compared with ECG of 22-MAR-2022 13:25,  Atrial fibrillation has replaced Sinus rhythm  Right bundle branch block is no longer present  Septal infarct is now present        Echo Results  (Last 48 hours)      None         Cardiolite (Tc-99m Sestamibi) stress test        Impression / Plan:    Patient Active Problem List   Diagnosis Code    Acute pancreatitis K85.90    Cholelithiasis K80.20    Pancreatitis K85.90    Fall W19. XXXA    Rib contusion, right, initial encounter S20.211A    Right shoulder injury, initial encounter S49. 91XA    Laceration of right hand without foreign body S61.411A    Acute exacerbation of CHF (congestive heart failure) (HCC) I50.9    HTN (hypertension) I10    Hypothyroidism E03.9    New onset atrial fibrillation (HCC) I48.91    Aortic stenosis I35.0    Hypomagnesemia E83.42       A/P    Acute on chronic diastolic heart failure  Possible paroxysmal atrial fibrillation  Frequent nonsustained VT, 3 beat runs, monomorphic  Severe aortic valve stenosis  Hypertension  History of recurrent fall      Plan:  Start low-dose metoprolol 12.5 mg twice daily and titrate with blood pressure and heart rate  Patient clearly declined for any procedure on the aortic valve  Continue supportive treatment  Patient have a history of recurrent falls which has improved according to the patient.   At this point patient is unable to make a final decision about anticoagulation. I have called patient's daughter unable to get connected with her. Aurelio Cargo [phone]     At this point with this advanced age and history of recurrent fall probably no anticoagulation is preferable. Will discuss with the family when available. Thank you for allowing me to participate in management of this pleasant patient. These feel free to call me if you have any questions or concerns.     Signed By: Ciara Lucero MD     January 3, 2023

## 2023-01-03 NOTE — H&P
History & Physical    Patient: Haylee Govea MRN: [id number]  CSN: [id number]    YOB: 1927  Age: 80 y.o. Sex: female      DOA: 1/2/2023  Primary Care Provider:  Katey Steele DO      Assessment/Plan     Patient Active Problem List   Diagnosis Code    Acute pancreatitis K85.90    Cholelithiasis K80.20    Pancreatitis K85.90    Fall W19. XXXA    Rib contusion, right, initial encounter S20.211A    Right shoulder injury, initial encounter S49. 91XA    Laceration of right hand without foreign body S61.411A    Acute exacerbation of CHF (congestive heart failure) (HCC) I50.9    HTN (hypertension) I10    Hypothyroidism E03.9    New onset atrial fibrillation (HCC) I48.91    Aortic stenosis I35.0    Hypomagnesemia E83.42     79 y/o female with HTN, hypothyroidism, and aortic stenosis is admitted for an acute CHF exacerbation with new onset atrial fibrillation.     Acute CHF exacerbation-new onset  --Telemetry  --Trend troponins  --IV lasix diuresis  --Strict I/O  --Echocardiogram  --Cardiology consult    New onset atrial fibrillation-rate controlled  --Anticoagulation with lovenox  --Will need to discuss further regarding long term anticoagulation given age  and unsteadiness on her feet  --Diltiazem if needed given she also has heart failure    Aortic stenosis-declined TAVR in the past due to risk of anesthesia  --Echocardiogram    Hypomagnesemia  --Replete for goal of 2, goal K of 4    HTN  --Continue home diovan at half dose in case she also needs medication for rate control    Hypothyroidism  --Check TSH with atrial fibrillation  --Continue synthroid    DNR/DNI    Prophylaxis: protonix PO, lovenox SQ    Estimated length of stay : 2-3 nights    Caitlyn Acevedo MD  No[employer]urnist  -----------------------------------------------------------------------------------------------------------------------------------------------------------------------------------------------------------------------  CC: shortness of breath       HPI:     Tierra Gray is a 80 y.o. female with HTN, hypothyroidism, and aortic stenosis who presents to the ED with worsening lower extremity swelling for the past 2 months as well as shortness of breath and cough. She has a strong family history of heart disease but she has no prior history of CHF or MI. She does have aortic stenosis but declined TAVR due to the risk of anesthesia. She has been doing well until recently, when she feels very short of breath when walking around with her walker. She is unsteady on her feet and lives in assisted living. She denies fever, nausea, vomiting, or diarrhea. Past Medical History:   Diagnosis Date    Cancer ([address]     Diverticulitis     Hypertension     Hypothyroid        Past Surgical History:   Procedure Laterality Date    HX MASTECTOMY      right    HX ORTHOPAEDIC      shoulder       Family History   Problem Relation Age of Onset    Heart Disease Mother     Heart Disease Father        Social History     Socioeconomic History    Marital status:    Tobacco Use    Smoking status: Never    Smokeless tobacco: Never   Substance and Sexual Activity    Alcohol use: No       Prior to Admission medications    Medication Sig Start Date End Date Taking? Authorizing Provider   amLODIPine (NORVASC) 10 mg tablet Take 2.5 mg by mouth nightly. Yes Provider, Historical   levothyroxine (SYNTHROID) 100 mcg tablet Take 100 mcg by mouth Daily (before breakfast). Yes Anthony, MD David   lidocaine 4 % patch 1 Patch by TransDERmal route every twelve (12) hours every twelve (12) hours. 9/19/20   Mirta BURGER DO   valsartan-hydroCHLOROthiazide (DIOVAN-HCT) 160-25 mg per tablet Take 1 Tab by mouth daily. David Cruz MD   sertraline (ZOLOFT) 50 mg tablet Take 50 mg by mouth daily. Patient not taking: Reported on 1/2/2023    OtherDavid MD   bimatoprost (LUMIGAN) 0.01 % ophthalmic drops Administer 1 Drop to both eyes every evening.   Patient not taking: Reported on 2023    David Cruz MD   brimonidine-timoloL (COMBIGAN) 0.2-0.5 % drop ophthalmic solution 1 Drop every twelve (12) hours. Patient not taking: Reported on 2023    David Cruz MD   omeprazole (PRILOSEC) 20 mg capsule Take 20 mg by mouth daily. David Cruz MD   calcium-cholecalciferol, d3, (CALCIUM 600 + D) 600-125 mg-unit tab Take  by mouth. David Cruz MD       Allergies   Allergen Reactions    Levaquin [Levofloxacin] Other (comments)     Tremors       Pcn [Penicillins] Rash       Review of Systems  Gen: No fever, chills, malaise, weight loss/gain. Heent: No headache, sore throat. Heart: No chest pain, palpitations, +RODRIGUEZ, pnd, or orthopnea. Resp: +cough, hemoptysis, wheezing and shortness of breath. GI: No nausea, vomiting, diarrhea, constipation, melena or hematochezia. : No urinary obstruction, dysuria or hematuria. Derm: No rash, new skin lesion or pruritis. Musc/skeletal: no bone or joint complaints. Vasc: +edema, no cyanosis or claudication. Endo: No heat/cold intolerance, no polyuria,polydipsia or polyphagia. Neuro: No unilateral weakness, numbness, tingling. No seizures. Heme: No easy bruising or bleeding. Physical Exam:     Physical Exam:  Visit Vitals  BP (!) 166/78   Pulse 79   Temp 98.4 °F (36.9 °C)   Resp 21   SpO2 97%      O2 Device: None (Room air)    Temp (24hrs), Av.4 °F (36.9 °C), Min:98.4 °F (36.9 °C), Max:98.4 °F (36.9 °C)    No intake/output data recorded.  0701 -  1900  In: -   Out: 300 [Urine:300]    General:  Awake, cooperative, no distress. Head:  Normocephalic, without obvious abnormality, atraumatic. Eyes:  Conjunctivae/corneas clear, sclera anicteric. Neck: Supple, symmetrical, trachea midline. Lungs:   Clear to auscultation bilaterally. Heart:  Regular rate and rhythm, S1, S2 normal, no murmur, click, rub or gallop. Abdomen: Soft, non-tender.  Bowel sounds normal. No masses,  No organomegaly. Extremities: Extremities normal, atraumatic, no cyanosis. 2+ pitting edema to the knees with slight left>right swelling. Capillary refill normal.   Pulses: 2+ and symmetric all extremities. Skin: Skin color pink, turgor normal. No rashes or lesions   Neurologic: No focal motor or sensory deficit. Labs Reviewed: All lab results for the last 24 hours reviewed. Recent Results (from the past 24 hour(s))   EKG, 12 LEAD, INITIAL    Collection Time: 01/02/23  1:44 PM   Result Value Ref Range    Ventricular Rate 97 BPM    QRS Duration 110 ms    Q-T Interval 394 ms    QTC Calculation (Bezet) 500 ms    Calculated R Axis 89 degrees    Calculated T Axis -92 degrees    Diagnosis       Atrial fibrillation with a competing junctional pacemaker with premature   ventricular or aberrantly conducted complexes  Septal infarct , age undetermined  Marked ST abnormality, possible inferolateral subendocardial injury  Abnormal ECG  When compared with ECG of 22-MAR-2022 13:25,  Atrial fibrillation has replaced Sinus rhythm  Right bundle branch block is no longer present  Septal infarct is now present     CBC WITH AUTOMATED DIFF    Collection Time: 01/02/23  1:45 PM   Result Value Ref Range    WBC 9.1 4.6 - 13.2 K/uL    RBC 3.63 (L) 4.20 - 5.30 M/uL    HGB 11.6 (L) 12.0 - 16.0 g/dL    HCT 35.8 35.0 - 45.0 %    MCV 98.6 78.0 - 100.0 FL    MCH 32.0 24.0 - 34.0 PG    MCHC 32.4 31.0 - 37.0 g/dL    RDW 13.3 11.6 - 14.5 %    PLATELET 730 096 - 376 K/uL    MPV 9.8 9.2 - 11.8 FL    NRBC 0.0 0  WBC    ABSOLUTE NRBC 0.00 0.00 - 0.01 K/uL    NEUTROPHILS 70 40 - 73 %    LYMPHOCYTES 14 (L) 21 - 52 %    MONOCYTES 10 3 - 10 %    EOSINOPHILS 5 0 - 5 %    BASOPHILS 0 0 - 2 %    IMMATURE GRANULOCYTES 1 (H) 0.0 - 0.5 %    ABS. NEUTROPHILS 6.3 1.8 - 8.0 K/UL    ABS. LYMPHOCYTES 1.3 0.9 - 3.6 K/UL    ABS. MONOCYTES 0.9 0.05 - 1.2 K/UL    ABS. EOSINOPHILS 0.5 (H) 0.0 - 0.4 K/UL    ABS. BASOPHILS 0.0 0.0 - 0.1 K/UL    ABS. IMM. GRANS. 0.1 (H) 0.00 - 0.04 K/UL    DF AUTOMATED     METABOLIC PANEL, COMPREHENSIVE    Collection Time: 01/02/23  1:45 PM   Result Value Ref Range    Sodium 138 136 - 145 mmol/L    Potassium 3.5 3.5 - 5.5 mmol/L    Chloride 104 100 - 111 mmol/L    CO2 27 21 - 32 mmol/L    Anion gap 7 3.0 - 18 mmol/L    Glucose 101 (H) 74 - 99 mg/dL    BUN 15 7.0 - 18 MG/DL    Creatinine 0.92 0.6 - 1.3 MG/DL    BUN/Creatinine ratio 16 12 - 20      eGFR 57 (L) >60 ml/min/1.73m2    Calcium 8.6 8.5 - 10.1 MG/DL    Bilirubin, total 0.5 0.2 - 1.0 MG/DL    ALT (SGPT) 28 13 - 56 U/L    AST (SGOT) 26 10 - 38 U/L    Alk. phosphatase 113 45 - 117 U/L    Protein, total 6.5 6.4 - 8.2 g/dL    Albumin 3.6 3.4 - 5.0 g/dL    Globulin 2.9 2.0 - 4.0 g/dL    A-G Ratio 1.2 0.8 - 1.7     NT-PRO BNP    Collection Time: 01/02/23  1:45 PM   Result Value Ref Range    NT pro-BNP 2,379 (H) 0 - 1,800 PG/ML   TROPONIN-HIGH SENSITIVITY    Collection Time: 01/02/23  1:45 PM   Result Value Ref Range    Troponin-High Sensitivity 24 0 - 54 ng/L   D DIMER    Collection Time: 01/02/23  1:45 PM   Result Value Ref Range    D DIMER 1.50 (H) <0.46 ug/ml(FEU)   MAGNESIUM    Collection Time: 01/02/23  1:45 PM   Result Value Ref Range    Magnesium 1.4 (L) 1.6 - 2.6 mg/dL   TSH 3RD GENERATION    Collection Time: 01/02/23  1:45 PM   Result Value Ref Range    TSH 6.23 (H) 0.36 - 3.74 uIU/mL   COVID-19 WITH INFLUENZA A/B    Collection Time: 01/02/23  3:51 PM   Result Value Ref Range    SARS-CoV-2 by PCR Not detected NOTD      Influenza A by PCR Not detected NOTD      Influenza B by PCR Not detected NOTD     TROPONIN-HIGH SENSITIVITY    Collection Time: 01/02/23  7:30 PM   Result Value Ref Range    Troponin-High Sensitivity 29 0 - 54 ng/L     Results  CTA CHEST W OR W WO CONT (Accession [id number]) (Order [id number])  Allergies       Not Specified: Levaquin [Levofloxacin];   Pcn [Penicillins]     Exam Information    Status Exam Begun  Exam Ended    Final [99] 1/02/2023 17:06 1/02/2023 5:37 PM [id number]  5:37 PM     Result Information    Status: Final result (Exam End: 1/2/2023 17:37) Provider Status: Open       CTA CHEST W OR W WO CONT: Patient Communication     Released  Not seen     Study Result    Narrative & Impression   EXAM: CTA CHEST W OR W WO CONT     CLINICAL INDICATION/HISTORY: cough, SOB, elevated ddimer, left leg swelling     COMPARISON: Chest x-ray dated March 22, 2022 and chest CT dated September 19, 2020     TECHNIQUE: Axial CT imaging from the thoracic inlet through the diaphragm with  intravenous contrast. Coronal and sagittal MIP reformats were generated. One or  more dose reduction techniques were used on this CT: automated exposure control,  adjustment of the mAs and/or kVp according to patient size, and iterative  reconstruction techniques. The specific techniques used on this CT exam have  been documented in the patient's electronic medical record. Digital Imaging and  Communications in Medicine (DICOM) format image data are available to  nonaffiliated external healthcare facilities or entities on a secure, media  free, reciprocally searchable basis with patient authorization for at least a  12-month period after this study. _______________     FINDINGS:     EXAM QUALITY: Adequate for diagnosis. PULMONARY ARTERIES: No evidence of pulmonary embolism. MEDIASTINUM: Heart is enlarged. No pericardial effusion. Mitral annular  calcifications are noted. LUNGS: Mosaic attenuation pattern with patchy areas of groundglass and septal  thickening throughout the lungs is now seen. PLEURA: Trace left pleural effusion. AIRWAY: Mild scattered bronchial wall thickening with areas of mucous plugging. LYMPH NODES: No enlarged nodes. UPPER ABDOMEN: Status post cholecystectomy. Colonic diverticulosis. OTHER: Left shoulder arthroplasty hardware noted. Advanced degenerative changes  of the right shoulder. Prior vertebral augmentation at L3. _______________     IMPRESSION     1. No evidence of pulmonary embolism. 2. Mosaic attenuation patchy groundglass and septal thickening may reflect  combination of pulmonary edema along with small airways disease. No focal  consolidation. 3. Trace left pleural effusion. Results  XR CHEST PORT (Accession [id number]) (Order [id number])  Allergies       Not Specified: Levaquin [Levofloxacin]; Pcn [Penicillins]     Exam Information    Status Exam Begun  Exam Ended    Final [99] 2023 13:52 2023  2:27 PM [id number]  2:27 PM     Result Information    Status: Final result (Exam End: 2023 14:27) Provider Status: Open       XR CHEST PORT: Patient Communication     Released  Not seen     Study Result    Narrative & Impression   EXAM: XR CHEST PORT     CLINICAL INDICATION/HISTORY: cough    > Additional: None. COMPARISON: 2022     TECHNIQUE: Portable chest     _______________     FINDINGS:     SUPPORT DEVICES: None. HEART AND MEDIASTINUM: Normal size and contour. Normal pulmonary vasculature. LUNGS AND PLEURAL SPACES: Chronic reticular interstitial thickening noted,  slightly more pronounced compared to prior studies. No developing consolidation. No effusion or pneumothorax. BONY THORAX AND SOFT TISSUES: No acute osseous abnormality. _______________     IMPRESSION     Chronic interstitial changes, possibly mild component of interstitial edema. Otherwise, no active cardiopulmonary disease.        Albaro Vega  DUPLEX LOWER EXT VENOUS LEFT  Order# [id number]  Ordering physician: Melvia Kehr, PA-C Study date: 23     Patient Information    Patient Name   Albaro Vega MRN   [id number] Legal Sex   Female      3/22/1927 (89 y.o.)     Vascular History    DUPLEX LOWER EXT VENOUS LEFT (Order #[id number]) on 23     Reason for Exam  Priority: STAT  Edema     Procedure Technique    Duplex images were obtained using 2-D gray scale, color flow, and spectral Doppler analysis. Important Information    THIS IS A PRELIMINARY RESULT      Vitals    Weight Height BSA (calculated - sq m) BP Pulse (Heart Rate)      137/57      Interpretation Summary    No evidence of deep vein thrombosis in the left lower extremity.

## 2023-01-03 NOTE — PROGRESS NOTES
D/C PLan: Return to [address] [place] [address] at [facility] on the [facility] with BARRY Yao Camacho 105 versus return to [facility] on the [facility]    Reason for Admission: acute exacerbation of CHF    Chart reviewed. Per H&P: \"Erinn Boone is a 80 y.o. female with HTN, hypothyroidism, and aortic stenosis who presents to the ED with worsening lower extremity swelling for the past 2 months as well as shortness of breath and cough. She has a strong family history of heart disease but she has no prior history of CHF or MI. She does have aortic stenosis but declined TAVR due to the risk of anesthesia. She has been doing well until recently, when she feels very short of breath when walking around with her walker. She is unsteady on her feet and lives in assisted living. She denies fever, nausea, vomiting, or diarrhea    Prior to admission patient was living: in [address] [place] [address] at [facility] on the [facility]    Prior to admission patient was using the following DME: walker, came and an electric lift carla (like a recliner that lifts her up to standing)                    RUR Score: 9%                   Plan for utilizing home health: CM anticipates possible HH versus none           PCP: First and Last name: Faustino Field DO    Name of Practice:    Are you a current patient: Yes/No: yes   Approximate date of last visit:    Can you participate in a virtual visit with your PCP:                     Current Advanced Directive/Advance Care Plan: DNR    Healthcare Decision Maker: pawan Blank, [phone]  Click here to [address] including [address] [facility] Relationship (ie \"Primary\")                         Transition of Care Plan: Return to [address] [place] [address] at [facility] on the [facility] with [facility] versus none    CM met with the patient at bedside with son Sanjana Mahmood sitting at the bedside. Patient verified that she lives at [facility] on the [facility] and stated her plan is to return there upon discharge.  The patient verified her facesheet and PCP and that she wants her daughter Mary Alice Polanco to be her decision maker. The patient states she uses a walker mainly but also uses a cane at night, and has an electric chair that lifts her to a standing position (she says she does not use it often). THe rafitne t states she walks al the way to the dining room with her walker, makes her own bed, handles her own medications and is as independent as possible at the AL facility. Patient is [place]. Patient denies using home O2, denies being active with [facility] and denies having been to a rehab facility in the past 60 days. Care Management Interventions  PCP Verified by CM: Yes  Mode of Transport at Discharge:  Other (see comment) (family to transport)  Transition of Care Consult (CM Consult): Assisted Living  Health Maintenance Reviewed: Yes  Physical Therapy Consult: No  Occupational Therapy Consult: No  Support Systems: Child(ana)  Confirm Follow Up Transport: Family  The Plan for Transition of Care is Related to the Following Treatment Goals : return to AL at [facility] on the [facility]  The Patient and/or Patient Representative was Provided with a Choice of Provider and Agrees with the Discharge Plan?: Yes  Freedom of Choice List was Provided with Basic Dialogue that Supports the Patient's Individualized Plan of Care/Goals, Treatment Preferences and Shares the Quality Data Associated with the Providers?: Yes  Discharge Location  Patient Expects to be Discharged to[de-identified] Assisted Living

## 2023-01-03 NOTE — PROGRESS NOTES
Hospitalist Progress Note    Patient: Jacob Gregg MRN: [id number]  CSN: [id number]    YOB: 1927  Age: 80 y.o. Sex: female    DOA: 1/2/2023 LOS:  LOS: 1 day                Assessment/Plan     Patient Active Problem List   Diagnosis Code    Acute pancreatitis K85.90    Cholelithiasis K80.20    Pancreatitis K85.90    Fall W19. XXXA    Rib contusion, right, initial encounter S20.211A    Right shoulder injury, initial encounter S49. 91XA    Laceration of right hand without foreign body S61.411A    Acute exacerbation of CHF (congestive heart failure) (HCC) I50.9    HTN (hypertension) I10    Hypothyroidism E03.9    New onset atrial fibrillation (HCC) I48.91    Aortic stenosis I35.0    Hypomagnesemia E83.42        Chief complaint :  SOB  81 y/o female with HTN, hypothyroidism, and aortic stenosis is admitted for an acute CHF exacerbation with new onset atrial fibrillation. Acute CHF exacerbation-  new onset  IV lasix diuresis  Strict I/O  Echocardiogram  Cardiology consulted     New onset atrial fibrillation-  rate controlled  Patient refused anticoagulation     Aortic stenosis-  declined TAVR in the past      Hypomagnesemia  Replete for goal of 2, goal K of 4     HTN  Monitor BP     Hypothyroidism  Check TSH  Continue synthroid    Discussed with son and 2 daughters at bedside along with patient. Disposition : TBD    Review of systems  General: No fevers or chills. Cardiovascular: No chest pain or pressure. No palpitations. Pulmonary: No shortness of breath. Gastrointestinal: No nausea, vomiting. Physical Exam:  General: Awake, cooperative, no acute distress    HEENT: NC, Atraumatic. PERRLA, anicteric sclerae. Lungs: Rales lower lungs bilaterally. Heart:  S1 S2,  + murmur, No Rubs, No Gallops  Abdomen: Soft, Non distended, Non tender. +Bowel sounds,   Extremities: Mild edema bilaterally  Psych:   Not anxious or agitated. Neurologic:  No acute neurological deficit.          Vital signs/Intake and Output:  Visit Vitals  BP (!) 142/57   Pulse 80   Temp 98.1 °F (36.7 °C)   Resp 18   Ht 5' 1\" (1.549 m)   Wt 68 kg (150 lb)   SpO2 91%   BMI 28.34 kg/m²     Current Shift:  No intake/output data recorded. Last three shifts:  01/01 1901 - 01/03 0700  In: -   Out: 300 [Urine:300]            Labs: Results:       Chemistry Recent Labs     01/03/23 0228 01/02/23  1345   * 101*    138   K 3.7 3.5    104   CO2 28 27   BUN 13 15   CREA 0.77 0.92   CA 8.5 8.6   AGAP 6 7   BUCR 17 16    113   TP 5.9* 6.5   ALB 3.2* 3.6   GLOB 2.7 2.9   AGRAT 1.2 1.2      CBC w/Diff Recent Labs     01/03/23 0228 01/02/23  1345   WBC 8.9 9.1   RBC 3.57* 3.63*   HGB 11.6* 11.6*   HCT 34.9* 35.8    335   GRANS  --  70   LYMPH  --  14*   EOS  --  5      Cardiac Enzymes No results for input(s): CPK, CKND1, ENZO in the last 72 hours. No lab exists for component: CKRMB, TROIP   Coagulation No results for input(s): PTP, INR, APTT, INREXT in the last 72 hours. Lipid Panel No results found for: CHOL, CHOLPOCT, CHOLX, CHLST, CHOLV, 362695, HDL, HDLP, LDL, LDLC, DLDLP, 441918, VLDLC, VLDL, TGLX, TRIGL, TRIGP, TGLPOCT, CHHD, CHHDX   BNP No results for input(s): BNPP in the last 72 hours.    Liver Enzymes Recent Labs     01/03/23 0228   TP 5.9*   ALB 3.2*         Thyroid Studies Lab Results   Component Value Date/Time    TSH 6.23 (H) 01/02/2023 01:45 PM        Procedures/imaging: see electronic medical records for all procedures/Xrays and details which were not copied into this note but were reviewed prior to creation of Plan

## 2023-01-03 NOTE — PROGRESS NOTES
Problem: Falls - Risk of  Goal: *Absence of Falls  Description: Document Viv Don Fall Risk and appropriate interventions in the flowsheet.   Outcome: Progressing Towards Goal  Note: Fall Risk Interventions:  Mobility Interventions: Bed/chair exit alarm, Utilize walker, cane, or other assistive device         Medication Interventions: Bed/chair exit alarm, Evaluate medications/consider consulting pharmacy         History of Falls Interventions: Bed/chair exit alarm, Door open when patient unattended, Evaluate medications/consider consulting pharmacy, Investigate reason for fall         Problem: Patient Education: Go to Patient Education Activity  Goal: Patient/Family Education  Outcome: Progressing Towards Goal

## 2023-01-04 LAB
ALBUMIN SERPL-MCNC: 3.3 G/DL (ref 3.4–5)
ALBUMIN/GLOB SERPL: 1 (ref 0.8–1.7)
ALP SERPL-CCNC: 102 U/L (ref 45–117)
ALT SERPL-CCNC: 22 U/L (ref 13–56)
ANION GAP SERPL CALC-SCNC: 7 MMOL/L (ref 3–18)
AST SERPL-CCNC: 20 U/L (ref 10–38)
BILIRUB SERPL-MCNC: 0.7 MG/DL (ref 0.2–1)
BUN SERPL-MCNC: 16 MG/DL (ref 7–18)
BUN/CREAT SERPL: 17 (ref 12–20)
CALCIUM SERPL-MCNC: 9 MG/DL (ref 8.5–10.1)
CHLORIDE SERPL-SCNC: 101 MMOL/L (ref 100–111)
CO2 SERPL-SCNC: 28 MMOL/L (ref 21–32)
CREAT SERPL-MCNC: 0.92 MG/DL (ref 0.6–1.3)
ERYTHROCYTE [DISTWIDTH] IN BLOOD BY AUTOMATED COUNT: 13.4 % (ref 11.6–14.5)
GLOBULIN SER CALC-MCNC: 3.3 G/DL (ref 2–4)
GLUCOSE SERPL-MCNC: 105 MG/DL (ref 74–99)
HCT VFR BLD AUTO: 35.7 % (ref 35–45)
HGB BLD-MCNC: 11.6 G/DL (ref 12–16)
MAGNESIUM SERPL-MCNC: 1.9 MG/DL (ref 1.6–2.6)
MCH RBC QN AUTO: 31.8 PG (ref 24–34)
MCHC RBC AUTO-ENTMCNC: 32.5 G/DL (ref 31–37)
MCV RBC AUTO: 97.8 FL (ref 78–100)
NRBC # BLD: 0 K/UL (ref 0–0.01)
NRBC BLD-RTO: 0 PER 100 WBC
PLATELET # BLD AUTO: 317 K/UL (ref 135–420)
PMV BLD AUTO: 10 FL (ref 9.2–11.8)
POTASSIUM SERPL-SCNC: 3.7 MMOL/L (ref 3.5–5.5)
PROT SERPL-MCNC: 6.6 G/DL (ref 6.4–8.2)
RBC # BLD AUTO: 3.65 M/UL (ref 4.2–5.3)
SODIUM SERPL-SCNC: 136 MMOL/L (ref 136–145)
WBC # BLD AUTO: 10.1 K/UL (ref 4.6–13.2)

## 2023-01-04 PROCEDURE — 74011250636 HC RX REV CODE- 250/636: Performed by: FAMILY MEDICINE

## 2023-01-04 PROCEDURE — 80053 COMPREHEN METABOLIC PANEL: CPT

## 2023-01-04 PROCEDURE — 36415 COLL VENOUS BLD VENIPUNCTURE: CPT

## 2023-01-04 PROCEDURE — 74011250637 HC RX REV CODE- 250/637: Performed by: FAMILY MEDICINE

## 2023-01-04 PROCEDURE — 85027 COMPLETE CBC AUTOMATED: CPT

## 2023-01-04 PROCEDURE — 65270000046 HC RM TELEMETRY

## 2023-01-04 PROCEDURE — 74011000250 HC RX REV CODE- 250: Performed by: FAMILY MEDICINE

## 2023-01-04 PROCEDURE — 83735 ASSAY OF MAGNESIUM: CPT

## 2023-01-04 PROCEDURE — 74011250637 HC RX REV CODE- 250/637: Performed by: INTERNAL MEDICINE

## 2023-01-04 RX ORDER — METOPROLOL TARTRATE 25 MG/1
25 TABLET, FILM COATED ORAL EVERY 12 HOURS
Status: DISCONTINUED | OUTPATIENT
Start: 2023-01-04 | End: 2023-01-12 | Stop reason: HOSPADM

## 2023-01-04 RX ADMIN — VALSARTAN 80 MG: 80 TABLET, FILM COATED ORAL at 09:24

## 2023-01-04 RX ADMIN — PANTOPRAZOLE SODIUM 40 MG: 40 TABLET, DELAYED RELEASE ORAL at 09:25

## 2023-01-04 RX ADMIN — METOPROLOL TARTRATE 25 MG: 25 TABLET, FILM COATED ORAL at 21:28

## 2023-01-04 RX ADMIN — FUROSEMIDE 40 MG: 10 INJECTION, SOLUTION INTRAMUSCULAR; INTRAVENOUS at 21:28

## 2023-01-04 RX ADMIN — Medication 1 TABLET: at 09:25

## 2023-01-04 RX ADMIN — CYANOCOBALAMIN TAB 500 MCG 250 MCG: 500 TAB at 09:24

## 2023-01-04 RX ADMIN — SODIUM CHLORIDE, PRESERVATIVE FREE 10 ML: 5 INJECTION INTRAVENOUS at 21:29

## 2023-01-04 RX ADMIN — FUROSEMIDE 40 MG: 10 INJECTION, SOLUTION INTRAMUSCULAR; INTRAVENOUS at 09:25

## 2023-01-04 RX ADMIN — LEVOTHYROXINE SODIUM 100 MCG: 0.1 TABLET ORAL at 09:24

## 2023-01-04 RX ADMIN — BENZONATATE 100 MG: 100 CAPSULE ORAL at 03:59

## 2023-01-04 RX ADMIN — METOPROLOL TARTRATE 12.5 MG: 25 TABLET, FILM COATED ORAL at 09:24

## 2023-01-04 NOTE — PROGRESS NOTES
Cardiology Progress Note        Patient: Albaro Vega        Sex: female          DOA: 1/2/2023  YOB: 1927      Age:  80 y.o.        LOS:  LOS: 2 days   Assessment/Plan     Principal Problem:    Acute exacerbation of CHF (congestive heart failure) ([facility] [address] (1/2/2023)    Active Problems:    HTN (hypertension) (1/2/2023)      Hypothyroidism (1/2/2023)      New onset atrial fibrillation ([facility] [address] (1/2/2023)      Aortic stenosis (1/2/2023)      Hypomagnesemia (1/2/2023)        Plan:    Cardiac telemetry with PVCs  Patient is complaining of cough on and off with minimal sputum  DC valsartan  Increase metoprolol titrate from 12.5 mg to 25 mg twice daily  Discussed with patient. I have also tried to call her daughter again today unable to get connected. Subjective:    cc:  Shortness of breath        REVIEW OF SYSTEMS:     General: No fevers or chills. Cardiovascular: No chest pain or pressure. No palpitations. No ankle swelling  Pulmonary: No SOB, orthopnea, PND  Gastrointestinal: No nausea, vomiting or diarrhea      Objective:      Visit Vitals  BP (!) 141/50   Pulse 78   Temp 98 °F (36.7 °C)   Resp 18   Ht 5' 1\" (1.549 m)   Wt 70.3 kg (154 lb 15.7 oz)   SpO2 97%   BMI 29.28 kg/m²     Body mass index is 29.28 kg/m². Physical Exam:  General Appearance: Comfortable, not using accessory muscles of respiration. NECK: No JVD, no thyroidomeglay. LUNGS: Clear bilaterally. HEART: S1+S2 audible, grade 3 ejection systolic murmur at aortic area    ABD: Non-tender, BS Audible    EXT: No edema, and no cysnosis. VASCULAR EXAM: Pulses are intact. PSYCHIATRIC EXAM: Mood is appropriate.     Medication:  Current Facility-Administered Medications   Medication Dose Route Frequency    metoprolol tartrate (LOPRESSOR) tablet 25 mg  25 mg Oral Q12H    levothyroxine (SYNTHROID) tablet 100 mcg  100 mcg Oral ACB    pantoprazole (PROTONIX) tablet 40 mg  40 mg Oral ACB calcium-vitamin D (OS-NOÉ +D3) 500 mg-200 unit per tablet 1 Tablet  1 Tablet Oral DAILY    cyanocobalamin (VITAMIN B12) tablet 250 mcg  250 mcg Oral DAILY    benzonatate (TESSALON) capsule 100 mg  100 mg Oral TID PRN    sodium chloride (NS) flush 5-40 mL  5-40 mL IntraVENous Q8H    sodium chloride (NS) flush 5-40 mL  5-40 mL IntraVENous PRN    acetaminophen (TYLENOL) tablet 650 mg  650 mg Oral Q6H PRN    Or    acetaminophen (TYLENOL) suppository 650 mg  650 mg Rectal Q6H PRN    polyethylene glycol (MIRALAX) packet 17 g  17 g Oral DAILY PRN    ondansetron (ZOFRAN ODT) tablet 4 mg  4 mg Oral Q8H PRN    Or    ondansetron (ZOFRAN) injection 4 mg  4 mg IntraVENous Q6H PRN    furosemide (LASIX) injection 40 mg  40 mg IntraVENous BID    enoxaparin (LOVENOX) injection 70 mg  1 mg/kg SubCUTAneous DAILY               Lab/Data Reviewed:  Procedures/imaging: see electronic medical records for all procedures/Xrays   and details which were not copied into this note but were reviewed prior to creation of Plan       All lab results for the last 24 hours reviewed. Recent Labs     01/04/23  0353 01/03/23  0228 01/02/23  1345   WBC 10.1 8.9 9.1   HGB 11.6* 11.6* 11.6*   HCT 35.7 34.9* 35.8    294 335     Recent Labs     01/04/23  0353 01/03/23  0228 01/02/23  1345    138 138   K 3.7 3.7 3.5    104 104   CO2 28 28 27   * 108* 101*   BUN 16 13 15   CREA 0.92 0.77 0.92   CA 9.0 8.5 8.6       RADIOLOGY:  CT Results  (Last 48 hours)                 01/02/23 1737  CTA CHEST W OR W WO CONT Final result    Impression:      1. No evidence of pulmonary embolism. 2. Mosaic attenuation patchy groundglass and septal thickening may reflect   combination of pulmonary edema along with small airways disease. No focal   consolidation. 3. Trace left pleural effusion.            Narrative:  EXAM: CTA CHEST W OR W WO CONT       CLINICAL INDICATION/HISTORY: cough, SOB, elevated ddimer, left leg swelling       COMPARISON: Chest x-ray dated March 22, 2022 and chest CT dated September 19, 2020       TECHNIQUE: Axial CT imaging from the thoracic inlet through the diaphragm with   intravenous contrast. Coronal and sagittal MIP reformats were generated. One or   more dose reduction techniques were used on this CT: automated exposure control,   adjustment of the mAs and/or kVp according to patient size, and iterative   reconstruction techniques. The specific techniques used on this CT exam have   been documented in the patient's electronic medical record. Digital Imaging and   Communications in Medicine (DICOM) format image data are available to   nonaffiliated external healthcare facilities or entities on a secure, media   free, reciprocally searchable basis with patient authorization for at least a   12-month period after this study. _______________       FINDINGS:       EXAM QUALITY: Adequate for diagnosis. PULMONARY ARTERIES: No evidence of pulmonary embolism. MEDIASTINUM: Heart is enlarged. No pericardial effusion. Mitral annular   calcifications are noted. LUNGS: Mosaic attenuation pattern with patchy areas of groundglass and septal   thickening throughout the lungs is now seen. PLEURA: Trace left pleural effusion. AIRWAY: Mild scattered bronchial wall thickening with areas of mucous plugging. LYMPH NODES: No enlarged nodes. UPPER ABDOMEN: Status post cholecystectomy. Colonic diverticulosis. OTHER: Left shoulder arthroplasty hardware noted. Advanced degenerative changes   of the right shoulder.  Prior vertebral augmentation at L3.       _______________                 CXR Results  (Last 48 hours)      None              Cardiology Procedures:   Results for orders placed or performed during the hospital encounter of 01/02/23   EKG, 12 LEAD, INITIAL   Result Value Ref Range    Ventricular Rate 97 BPM    QRS Duration 110 ms    Q-T Interval 394 ms    QTC Calculation (Bezet) 500 ms    Calculated R Axis 89 degrees    Calculated T Axis -92 degrees    Diagnosis       Atrial fibrillation with a competing junctional pacemaker with premature   ventricular or aberrantly conducted complexes  Septal infarct , age undetermined  Marked ST abnormality, possible inferolateral subendocardial injury  Abnormal ECG  When compared with ECG of 22-MAR-2022 13:25,  Atrial fibrillation has replaced Sinus rhythm  Right bundle branch block is no longer present  Septal infarct is now present  Confirmed by Mirella Carmona MD. ([id number]) on 1/3/2023 10:13:42 PM        Echo Results  (Last 48 hours)      None         Cardiolite (Tc-99m Sestamibi) stress test    Signed By: Douglas Ceja MD     January 4, 2023

## 2023-01-04 NOTE — PROGRESS NOTES
Problem: Falls - Risk of  Goal: *Absence of Falls  Description: Document Sondra Kemp Fall Risk and appropriate interventions in the flowsheet.   Outcome: Progressing Towards Goal  Note: Fall Risk Interventions:  Mobility Interventions: Utilize walker, cane, or other assistive device, Patient to call before getting OOB         Medication Interventions: Patient to call before getting OOB, Teach patient to arise slowly         History of Falls Interventions: Door open when patient unattended         Problem: Patient Education: Go to Patient Education Activity  Goal: Patient/Family Education  Outcome: Progressing Towards Goal     Problem: Pain  Goal: *Control of Pain  Outcome: Progressing Towards Goal     Problem: Patient Education: Go to Patient Education Activity  Goal: Patient/Family Education  Outcome: Progressing Towards Goal     Problem: Patient Education: Go to Patient Education Activity  Goal: Patient/Family Education  Outcome: Progressing Towards Goal     Problem: Heart Failure: Day 2  Goal: Off Pathway (Use only if patient is Off Pathway)  Outcome: Progressing Towards Goal  Goal: Activity/Safety  Outcome: Progressing Towards Goal  Goal: Consults, if ordered  Outcome: Progressing Towards Goal  Goal: Diagnostic Test/Procedures  Outcome: Progressing Towards Goal  Goal: Nutrition/Diet  Outcome: Progressing Towards Goal  Goal: Discharge Planning  Outcome: Progressing Towards Goal  Goal: Medications  Outcome: Progressing Towards Goal  Goal: Respiratory  Outcome: Progressing Towards Goal  Goal: Treatments/Interventions/Procedures  Outcome: Progressing Towards Goal  Goal: Psychosocial  Outcome: Progressing Towards Goal  Goal: *Oxygen saturation within defined limits  Outcome: Progressing Towards Goal  Goal: *Hemodynamically stable  Outcome: Progressing Towards Goal  Goal: *Optimal pain control at patient's stated goal  Outcome: Progressing Towards Goal  Goal: *Anxiety reduced or absent  Outcome: Progressing Towards Goal  Goal: *Demonstrates progressive activity  Outcome: Progressing Towards Goal     Problem: Heart Failure: Day 3  Goal: Off Pathway (Use only if patient is Off Pathway)  Outcome: Progressing Towards Goal  Goal: Activity/Safety  Outcome: Progressing Towards Goal  Goal: Diagnostic Test/Procedures  Outcome: Progressing Towards Goal  Goal: Nutrition/Diet  Outcome: Progressing Towards Goal  Goal: Discharge Planning  Outcome: Progressing Towards Goal  Goal: Medications  Outcome: Progressing Towards Goal  Goal: Respiratory  Outcome: Progressing Towards Goal  Goal: Treatments/Interventions/Procedures  Outcome: Progressing Towards Goal  Goal: Psychosocial  Outcome: Progressing Towards Goal  Goal: *Oxygen saturation within defined limits  Outcome: Progressing Towards Goal  Goal: *Hemodynamically stable  Outcome: Progressing Towards Goal  Goal: *Optimal pain control at patient's stated goal  Outcome: Progressing Towards Goal  Goal: *Anxiety reduced or absent  Outcome: Progressing Towards Goal  Goal: *Demonstrates progressive activity  Outcome: Progressing Towards Goal     Problem: Heart Failure: Day 4  Goal: Off Pathway (Use only if patient is Off Pathway)  Outcome: Progressing Towards Goal  Goal: Activity/Safety  Outcome: Progressing Towards Goal  Goal: Diagnostic Test/Procedures  Outcome: Progressing Towards Goal  Goal: Nutrition/Diet  Outcome: Progressing Towards Goal  Goal: Discharge Planning  Outcome: Progressing Towards Goal  Goal: Medications  Outcome: Progressing Towards Goal  Goal: Respiratory  Outcome: Progressing Towards Goal  Goal: Treatments/Interventions/Procedures  Outcome: Progressing Towards Goal  Goal: Psychosocial  Outcome: Progressing Towards Goal  Goal: *Oxygen saturation within defined limits  Outcome: Progressing Towards Goal  Goal: *Hemodynamically stable  Outcome: Progressing Towards Goal  Goal: *Optimal pain control at patient's stated goal  Outcome: Progressing Towards Goal  Goal: *Anxiety reduced or absent  Outcome: Progressing Towards Goal  Goal: *Demonstrates progressive activity  Outcome: Progressing Towards Goal     Problem: Heart Failure: Day 5  Goal: Off Pathway (Use only if patient is Off Pathway)  Outcome: Progressing Towards Goal  Goal: Activity/Safety  Outcome: Progressing Towards Goal  Goal: Diagnostic Test/Procedures  Outcome: Progressing Towards Goal  Goal: Nutrition/Diet  Outcome: Progressing Towards Goal  Goal: Discharge Planning  Outcome: Progressing Towards Goal  Goal: Medications  Outcome: Progressing Towards Goal  Goal: Respiratory  Outcome: Progressing Towards Goal  Goal: Treatments/Interventions/Procedures  Outcome: Progressing Towards Goal  Goal: Psychosocial  Outcome: Progressing Towards Goal     Problem: Heart Failure: Discharge Outcomes  Goal: *Demonstrates ability to perform prescribed activity without shortness of breath or discomfort  Outcome: Progressing Towards Goal  Goal: *Left ventricular function assessment completed prior to or during stay, or planned for post-discharge  Outcome: Progressing Towards Goal  Goal: *ACEI prescribed if LVEF less than 40% and no contraindications or ARB prescribed  Outcome: Progressing Towards Goal  Goal: *Verbalizes understanding and describes prescribed diet  Outcome: Progressing Towards Goal  Goal: *Verbalizes understanding/describes prescribed medications  Outcome: Progressing Towards Goal  Goal: *Describes available resources and support systems  Description: (eg: Home Health, Palliative Care, Advanced Medical Directive)  Outcome: Progressing Towards Goal  Goal: *Describes smoking cessation resources  Outcome: Progressing Towards Goal  Goal: *Understands and describes signs and symptoms to report to providers(Stroke Metric)  Outcome: Progressing Towards Goal  Goal: *Describes/verbalizes understanding of follow-up/return appt  Description: (eg: to physicians, diabetes treatment coordinator, and other resources  Outcome: Progressing Towards Goal  Goal: *Describes importance of continuing daily weights and changes to report to physician  Outcome: Progressing Towards Goal

## 2023-01-04 NOTE — PROGRESS NOTES
1900 - 0730: Patient remained stable throughout shift. No significant clinical changes noted. Bedside and Verbal shift change report given to [address] (oncoming nurse) by Violetta Florez (offgoing nurse). Report included the following information SBAR, Kardex, and MAR.

## 2023-01-04 NOTE — PROGRESS NOTES
Hospitalist Progress Note    Patient: Cheryle Sebastian MRN: [id number]  CSN: [id number]    YOB: 1927  Age: 80 y.o. Sex: female    DOA: 1/2/2023 LOS:  LOS: 2 days                Assessment/Plan     Patient Active Problem List   Diagnosis Code    Fall W19. XXXA    Rib contusion, right, initial encounter S20.211A    Right shoulder injury, initial encounter S49. 91XA    Acute exacerbation of CHF (congestive heart failure) ([facility]) I50.9    HTN (hypertension) I10    Hypothyroidism E03.9    New onset atrial fibrillation (HCC) I48.91    Aortic stenosis I35.0    Hypomagnesemia E83.42        Chief complaint :  SOB  81 y/o female with HTN, hypothyroidism, and aortic stenosis is admitted for an acute CHF exacerbation with new onset atrial fibrillation. Complains of cough. Acute CHF exacerbation-  IV lasix diuresis  Strict I/O  Echocardiogram showed normal left ventricular systolic function, EF of 60 to 65%, normal wall motion severe aortic stenosis  Cardiology following     New onset atrial fibrillation-  rate controlled on lopressor  High risk for fall, not on anticoagulation     Severe Aortic stenosis-  declined TAVR in the past      Hypomagnesemia  Replete for goal of 2, goal K of 4     HTN  Monitor BP     Hypothyroidism  TSH 6.23  Continue synthroid        Disposition : TBD    Review of systems  General: No fevers or chills. Cardiovascular: No chest pain or pressure. No palpitations. Pulmonary: No shortness of breath. Gastrointestinal: No nausea, vomiting. Physical Exam:  General: Awake, cooperative, no acute distress    HEENT: NC, Atraumatic. PERRLA, anicteric sclerae. Lungs: Rales lower lungs bilaterally. Heart:  S1 S2,  + murmur, No Rubs, No Gallops  Abdomen: Soft, Non distended, Non tender. +Bowel sounds,   Extremities: Mild edema bilaterally  Psych:   Not anxious or agitated. Neurologic:  No acute neurological deficit.          Vital signs/Intake and Output:  Visit Vitals  BP (!) 151/52   Pulse 84   Temp 97.9 °F (36.6 °C)   Resp 18   Ht 5' 1\" (1.549 m)   Wt 70.3 kg (154 lb 15.7 oz)   SpO2 95%   BMI 29.28 kg/m²     Current Shift:  01/04 0701 - 01/04 1900  In: 240 [P.O.:240]  Out: -   Last three shifts:  01/02 1901 - 01/04 0700  In: -   Out: 450 [Urine:450]            Labs: Results:       Chemistry Recent Labs     01/04/23 0353 01/03/23 0228 01/02/23  1345   * 108* 101*    138 138   K 3.7 3.7 3.5    104 104   CO2 28 28 27   BUN 16 13 15   CREA 0.92 0.77 0.92   CA 9.0 8.5 8.6   AGAP 7 6 7   BUCR 17 17 16    100 113   TP 6.6 5.9* 6.5   ALB 3.3* 3.2* 3.6   GLOB 3.3 2.7 2.9   AGRAT 1.0 1.2 1.2      CBC w/Diff Recent Labs     01/04/23 0353 01/03/23 0228 01/02/23  1345   WBC 10.1 8.9 9.1   RBC 3.65* 3.57* 3.63*   HGB 11.6* 11.6* 11.6*   HCT 35.7 34.9* 35.8    294 335   GRANS  --   --  70   LYMPH  --   --  14*   EOS  --   --  5      Cardiac Enzymes No results for input(s): CPK, CKND1, ENZO in the last 72 hours. No lab exists for component: CKRMB, TROIP   Coagulation No results for input(s): PTP, INR, APTT, INREXT, INREXT in the last 72 hours. Lipid Panel No results found for: CHOL, CHOLPOCT, CHOLX, CHLST, CHOLV, 057224, HDL, HDLP, LDL, LDLC, DLDLP, 265387, VLDLC, VLDL, TGLX, TRIGL, TRIGP, TGLPOCT, CHHD, CHHDX   BNP No results for input(s): BNPP in the last 72 hours.    Liver Enzymes Recent Labs     01/04/23  0353   TP 6.6   ALB 3.3*         Thyroid Studies Lab Results   Component Value Date/Time    TSH 6.23 (H) 01/02/2023 01:45 PM        Procedures/imaging: see electronic medical records for all procedures/Xrays and details which were not copied into this note but were reviewed prior to creation of Plan

## 2023-01-04 NOTE — PROGRESS NOTES
Problem: Falls - Risk of  Goal: *Absence of Falls  Description: Document Marcela Marking Fall Risk and appropriate interventions in the flowsheet.   Outcome: Progressing Towards Goal  Note: Fall Risk Interventions:  Mobility Interventions: Bed/chair exit alarm, Communicate number of staff needed for ambulation/transfer, Patient to call before getting OOB         Medication Interventions: Patient to call before getting OOB, Bed/chair exit alarm, Teach patient to arise slowly         History of Falls Interventions: Door open when patient unattended         Problem: Patient Education: Go to Patient Education Activity  Goal: Patient/Family Education  Outcome: Progressing Towards Goal     Problem: Pain  Goal: *Control of Pain  Outcome: Progressing Towards Goal     Problem: Patient Education: Go to Patient Education Activity  Goal: Patient/Family Education  Outcome: Progressing Towards Goal     Problem: Patient Education: Go to Patient Education Activity  Goal: Patient/Family Education  Outcome: Progressing Towards Goal     Problem: Heart Failure: Day 1  Goal: Off Pathway (Use only if patient is Off Pathway)  Outcome: Progressing Towards Goal  Goal: Activity/Safety  Outcome: Progressing Towards Goal  Goal: Consults, if ordered  Outcome: Progressing Towards Goal  Goal: Diagnostic Test/Procedures  Outcome: Progressing Towards Goal  Goal: Nutrition/Diet  Outcome: Progressing Towards Goal  Goal: Discharge Planning  Outcome: Progressing Towards Goal  Goal: Medications  Outcome: Progressing Towards Goal  Goal: Respiratory  Outcome: Progressing Towards Goal  Goal: Treatments/Interventions/Procedures  Outcome: Progressing Towards Goal  Goal: Psychosocial  Outcome: Progressing Towards Goal  Goal: *Oxygen saturation within defined limits  Outcome: Progressing Towards Goal  Goal: *Hemodynamically stable  Outcome: Progressing Towards Goal  Goal: *Optimal pain control at patient's stated goal  Outcome: Progressing Towards Goal  Goal: *Anxiety reduced or absent  Outcome: Progressing Towards Goal     Problem: Heart Failure: Day 2  Goal: Off Pathway (Use only if patient is Off Pathway)  Outcome: Progressing Towards Goal  Goal: Activity/Safety  Outcome: Progressing Towards Goal  Goal: Consults, if ordered  Outcome: Progressing Towards Goal  Goal: Diagnostic Test/Procedures  Outcome: Progressing Towards Goal  Goal: Nutrition/Diet  Outcome: Progressing Towards Goal  Goal: Discharge Planning  Outcome: Progressing Towards Goal  Goal: Medications  Outcome: Progressing Towards Goal  Goal: Respiratory  Outcome: Progressing Towards Goal  Goal: Treatments/Interventions/Procedures  Outcome: Progressing Towards Goal  Goal: Psychosocial  Outcome: Progressing Towards Goal  Goal: *Oxygen saturation within defined limits  Outcome: Progressing Towards Goal  Goal: *Hemodynamically stable  Outcome: Progressing Towards Goal  Goal: *Optimal pain control at patient's stated goal  Outcome: Progressing Towards Goal  Goal: *Anxiety reduced or absent  Outcome: Progressing Towards Goal  Goal: *Demonstrates progressive activity  Outcome: Progressing Towards Goal     Problem: Heart Failure: Day 3  Goal: Off Pathway (Use only if patient is Off Pathway)  Outcome: Progressing Towards Goal  Goal: Activity/Safety  Outcome: Progressing Towards Goal  Goal: Diagnostic Test/Procedures  Outcome: Progressing Towards Goal  Goal: Nutrition/Diet  Outcome: Progressing Towards Goal  Goal: Discharge Planning  Outcome: Progressing Towards Goal  Goal: Medications  Outcome: Progressing Towards Goal  Goal: Respiratory  Outcome: Progressing Towards Goal  Goal: Treatments/Interventions/Procedures  Outcome: Progressing Towards Goal  Goal: Psychosocial  Outcome: Progressing Towards Goal  Goal: *Oxygen saturation within defined limits  Outcome: Progressing Towards Goal  Goal: *Hemodynamically stable  Outcome: Progressing Towards Goal  Goal: *Optimal pain control at patient's stated goal  Outcome: Progressing Towards Goal  Goal: *Anxiety reduced or absent  Outcome: Progressing Towards Goal  Goal: *Demonstrates progressive activity  Outcome: Progressing Towards Goal     Problem: Heart Failure: Day 4  Goal: Off Pathway (Use only if patient is Off Pathway)  Outcome: Progressing Towards Goal  Goal: Activity/Safety  Outcome: Progressing Towards Goal  Goal: Diagnostic Test/Procedures  Outcome: Progressing Towards Goal  Goal: Nutrition/Diet  Outcome: Progressing Towards Goal  Goal: Discharge Planning  Outcome: Progressing Towards Goal  Goal: Medications  Outcome: Progressing Towards Goal  Goal: Respiratory  Outcome: Progressing Towards Goal  Goal: Treatments/Interventions/Procedures  Outcome: Progressing Towards Goal  Goal: Psychosocial  Outcome: Progressing Towards Goal  Goal: *Oxygen saturation within defined limits  Outcome: Progressing Towards Goal  Goal: *Hemodynamically stable  Outcome: Progressing Towards Goal  Goal: *Optimal pain control at patient's stated goal  Outcome: Progressing Towards Goal  Goal: *Anxiety reduced or absent  Outcome: Progressing Towards Goal  Goal: *Demonstrates progressive activity  Outcome: Progressing Towards Goal     Problem: Heart Failure: Day 5  Goal: Off Pathway (Use only if patient is Off Pathway)  Outcome: Progressing Towards Goal  Goal: Activity/Safety  Outcome: Progressing Towards Goal  Goal: Diagnostic Test/Procedures  Outcome: Progressing Towards Goal  Goal: Nutrition/Diet  Outcome: Progressing Towards Goal  Goal: Discharge Planning  Outcome: Progressing Towards Goal  Goal: Medications  Outcome: Progressing Towards Goal  Goal: Respiratory  Outcome: Progressing Towards Goal  Goal: Treatments/Interventions/Procedures  Outcome: Progressing Towards Goal  Goal: Psychosocial  Outcome: Progressing Towards Goal

## 2023-01-05 LAB
ALBUMIN SERPL-MCNC: 3.4 G/DL (ref 3.4–5)
ALBUMIN/GLOB SERPL: 1.1 (ref 0.8–1.7)
ALP SERPL-CCNC: 103 U/L (ref 45–117)
ALT SERPL-CCNC: 20 U/L (ref 13–56)
ANION GAP SERPL CALC-SCNC: 9 MMOL/L (ref 3–18)
AST SERPL-CCNC: 18 U/L (ref 10–38)
BILIRUB SERPL-MCNC: 0.6 MG/DL (ref 0.2–1)
BUN SERPL-MCNC: 18 MG/DL (ref 7–18)
BUN/CREAT SERPL: 19 (ref 12–20)
CALCIUM SERPL-MCNC: 9 MG/DL (ref 8.5–10.1)
CHLORIDE SERPL-SCNC: 100 MMOL/L (ref 100–111)
CO2 SERPL-SCNC: 28 MMOL/L (ref 21–32)
CREAT SERPL-MCNC: 0.94 MG/DL (ref 0.6–1.3)
ERYTHROCYTE [DISTWIDTH] IN BLOOD BY AUTOMATED COUNT: 13.3 % (ref 11.6–14.5)
GLOBULIN SER CALC-MCNC: 3.1 G/DL (ref 2–4)
GLUCOSE SERPL-MCNC: 116 MG/DL (ref 74–99)
HCT VFR BLD AUTO: 36.8 % (ref 35–45)
HGB BLD-MCNC: 12.3 G/DL (ref 12–16)
MAGNESIUM SERPL-MCNC: 1.6 MG/DL (ref 1.6–2.6)
MCH RBC QN AUTO: 32.4 PG (ref 24–34)
MCHC RBC AUTO-ENTMCNC: 33.4 G/DL (ref 31–37)
MCV RBC AUTO: 96.8 FL (ref 78–100)
NRBC # BLD: 0 K/UL (ref 0–0.01)
NRBC BLD-RTO: 0 PER 100 WBC
PLATELET # BLD AUTO: 277 K/UL (ref 135–420)
PMV BLD AUTO: 10.7 FL (ref 9.2–11.8)
POTASSIUM SERPL-SCNC: 3.4 MMOL/L (ref 3.5–5.5)
PROT SERPL-MCNC: 6.5 G/DL (ref 6.4–8.2)
RBC # BLD AUTO: 3.8 M/UL (ref 4.2–5.3)
SODIUM SERPL-SCNC: 137 MMOL/L (ref 136–145)
WBC # BLD AUTO: 8.7 K/UL (ref 4.6–13.2)

## 2023-01-05 PROCEDURE — 36415 COLL VENOUS BLD VENIPUNCTURE: CPT

## 2023-01-05 PROCEDURE — 83735 ASSAY OF MAGNESIUM: CPT

## 2023-01-05 PROCEDURE — 74011250637 HC RX REV CODE- 250/637: Performed by: FAMILY MEDICINE

## 2023-01-05 PROCEDURE — 74011250637 HC RX REV CODE- 250/637: Performed by: INTERNAL MEDICINE

## 2023-01-05 PROCEDURE — 85027 COMPLETE CBC AUTOMATED: CPT

## 2023-01-05 PROCEDURE — 80053 COMPREHEN METABOLIC PANEL: CPT

## 2023-01-05 PROCEDURE — 65270000046 HC RM TELEMETRY

## 2023-01-05 PROCEDURE — 74011250636 HC RX REV CODE- 250/636: Performed by: FAMILY MEDICINE

## 2023-01-05 PROCEDURE — 74011250637 HC RX REV CODE- 250/637: Performed by: HOSPITALIST

## 2023-01-05 PROCEDURE — 74011000250 HC RX REV CODE- 250: Performed by: FAMILY MEDICINE

## 2023-01-05 RX ORDER — CODEINE PHOSPHATE AND GUAIFENESIN 10; 100 MG/5ML; MG/5ML
5 SOLUTION ORAL ONCE
Status: COMPLETED | OUTPATIENT
Start: 2023-01-05 | End: 2023-01-05

## 2023-01-05 RX ORDER — POTASSIUM CHLORIDE 20 MEQ/1
40 TABLET, EXTENDED RELEASE ORAL
Status: COMPLETED | OUTPATIENT
Start: 2023-01-05 | End: 2023-01-05

## 2023-01-05 RX ADMIN — SODIUM CHLORIDE, PRESERVATIVE FREE 10 ML: 5 INJECTION INTRAVENOUS at 11:10

## 2023-01-05 RX ADMIN — FUROSEMIDE 40 MG: 10 INJECTION, SOLUTION INTRAMUSCULAR; INTRAVENOUS at 22:58

## 2023-01-05 RX ADMIN — METOPROLOL TARTRATE 25 MG: 25 TABLET, FILM COATED ORAL at 10:43

## 2023-01-05 RX ADMIN — POTASSIUM CHLORIDE 40 MEQ: 1500 TABLET, EXTENDED RELEASE ORAL at 12:34

## 2023-01-05 RX ADMIN — Medication 1 TABLET: at 10:42

## 2023-01-05 RX ADMIN — FUROSEMIDE 40 MG: 10 INJECTION, SOLUTION INTRAMUSCULAR; INTRAVENOUS at 10:44

## 2023-01-05 RX ADMIN — SODIUM CHLORIDE, PRESERVATIVE FREE 10 ML: 5 INJECTION INTRAVENOUS at 22:58

## 2023-01-05 RX ADMIN — METOPROLOL TARTRATE 25 MG: 25 TABLET, FILM COATED ORAL at 22:58

## 2023-01-05 RX ADMIN — PANTOPRAZOLE SODIUM 40 MG: 40 TABLET, DELAYED RELEASE ORAL at 07:22

## 2023-01-05 RX ADMIN — BENZONATATE 100 MG: 100 CAPSULE ORAL at 11:10

## 2023-01-05 RX ADMIN — CYANOCOBALAMIN TAB 500 MCG 250 MCG: 500 TAB at 10:42

## 2023-01-05 RX ADMIN — SODIUM CHLORIDE, PRESERVATIVE FREE 10 ML: 5 INJECTION INTRAVENOUS at 12:35

## 2023-01-05 RX ADMIN — GUAIFENESIN AND CODEINE PHOSPHATE 5 ML: 100; 10 SOLUTION ORAL at 22:58

## 2023-01-05 RX ADMIN — LEVOTHYROXINE SODIUM 100 MCG: 0.1 TABLET ORAL at 07:22

## 2023-01-05 RX ADMIN — SODIUM CHLORIDE, PRESERVATIVE FREE 10 ML: 5 INJECTION INTRAVENOUS at 19:18

## 2023-01-05 NOTE — PROGRESS NOTES
Hospitalist Progress Note    Patient: Avani Galvez MRN: [id number]  CSN: [id number]    YOB: 1927  Age: 80 y.o. Sex: female    DOA: 1/2/2023 LOS:  LOS: 3 days                Assessment/Plan     Patient Active Problem List   Diagnosis Code    Fall W19. XXXA    Rib contusion, right, initial encounter S20.211A    Right shoulder injury, initial encounter S49. 91XA    Acute exacerbation of CHF (congestive heart failure) ([facility]) I50.9    HTN (hypertension) I10    Hypothyroidism E03.9    New onset atrial fibrillation (HCC) I48.91    Aortic stenosis I35.0    Hypomagnesemia E83.42        Chief complaint :  SOB  79 y/o female with HTN, hypothyroidism, and aortic stenosis is admitted for an acute CHF exacerbation with new onset atrial fibrillation. Complains of cough. Started on tessalon     Acute CHF exacerbation-  IV lasix diuresis  Strict I/O  Echocardiogram showed normal left ventricular systolic function, EF of 60 to 65%, normal wall motion severe aortic stenosis  Cardiology following     New onset atrial fibrillation-  rate controlled on lopressor  High risk for fall, not on anticoagulation     Severe Aortic stenosis-  declined TAVR in the past      Hypomagnesemia  Replete for goal of 2, goal K of 4     HTN  Monitor BP     Hypothyroidism  TSH 6.23  Continue synthroid        Disposition : TBD    Review of systems  General: No fevers or chills. Cardiovascular: No chest pain or pressure. No palpitations. Pulmonary: No shortness of breath. Gastrointestinal: No nausea, vomiting. Physical Exam:  General: Awake, cooperative, no acute distress    HEENT: NC, Atraumatic. PERRLA, anicteric sclerae. Lungs: Rales lower lungs bilaterally. Heart:  S1 S2,  + murmur, No Rubs, No Gallops  Abdomen: Soft, Non distended, Non tender. +Bowel sounds,   Extremities: Mild edema bilaterally  Psych:   Not anxious or agitated. Neurologic:  No acute neurological deficit.          Vital signs/Intake and Output:  Visit Vitals  /68   Pulse 86   Temp 97.9 °F (36.6 °C)   Resp 18   Ht 5' 1\" (1.549 m)   Wt 70.3 kg (154 lb 15.7 oz)   SpO2 93%   BMI 29.28 kg/m²     Current Shift:  No intake/output data recorded. Last three shifts:  01/03 1901 - 01/05 0700  In: 240 [P.O.:240]  Out: 1950 [Urine:1950]            Labs: Results:       Chemistry Recent Labs     01/05/23  0600 01/04/23 0353 01/03/23 0228   * 105* 108*    136 138   K 3.4* 3.7 3.7    101 104   CO2 28 28 28   BUN 18 16 13   CREA 0.94 0.92 0.77   CA 9.0 9.0 8.5   AGAP 9 7 6   BUCR 19 17 17    102 100   TP 6.5 6.6 5.9*   ALB 3.4 3.3* 3.2*   GLOB 3.1 3.3 2.7   AGRAT 1.1 1.0 1.2      CBC w/Diff Recent Labs     01/05/23  0600 01/04/23 0353 01/03/23 0228   WBC 8.7 10.1 8.9   RBC 3.80* 3.65* 3.57*   HGB 12.3 11.6* 11.6*   HCT 36.8 35.7 34.9*    317 294      Cardiac Enzymes No results for input(s): CPK, CKND1, ENZO in the last 72 hours. No lab exists for component: CKRMB, TROIP   Coagulation No results for input(s): PTP, INR, APTT, INREXT, INREXT in the last 72 hours. Lipid Panel No results found for: CHOL, CHOLPOCT, CHOLX, CHLST, CHOLV, 277580, HDL, HDLP, LDL, LDLC, DLDLP, 747019, VLDLC, VLDL, TGLX, TRIGL, TRIGP, TGLPOCT, CHHD, CHHDX   BNP No results for input(s): BNPP in the last 72 hours.    Liver Enzymes Recent Labs     01/05/23  0600   TP 6.5   ALB 3.4         Thyroid Studies Lab Results   Component Value Date/Time    TSH 6.23 (H) 01/02/2023 01:45 PM        Procedures/imaging: see electronic medical records for all procedures/Xrays and details which were not copied into this note but were reviewed prior to creation of Plan

## 2023-01-05 NOTE — PROGRESS NOTES
Problem: Falls - Risk of  Goal: *Absence of Falls  Description: Document Paresh Larson Fall Risk and appropriate interventions in the flowsheet.   Outcome: Progressing Towards Goal  Note: Fall Risk Interventions:  Mobility Interventions: Utilize walker, cane, or other assistive device, Patient to call before getting OOB         Medication Interventions: Patient to call before getting OOB, Teach patient to arise slowly         History of Falls Interventions: Door open when patient unattended         Problem: Patient Education: Go to Patient Education Activity  Goal: Patient/Family Education  Outcome: Progressing Towards Goal     Problem: Pain  Goal: *Control of Pain  Outcome: Progressing Towards Goal     Problem: Patient Education: Go to Patient Education Activity  Goal: Patient/Family Education  Outcome: Progressing Towards Goal     Problem: Patient Education: Go to Patient Education Activity  Goal: Patient/Family Education  Outcome: Progressing Towards Goal     Problem: Heart Failure: Day 1  Goal: Off Pathway (Use only if patient is Off Pathway)  Outcome: Progressing Towards Goal  Goal: Activity/Safety  Outcome: Progressing Towards Goal  Goal: Consults, if ordered  Outcome: Progressing Towards Goal  Goal: Diagnostic Test/Procedures  Outcome: Progressing Towards Goal  Goal: Nutrition/Diet  Outcome: Progressing Towards Goal  Goal: Discharge Planning  Outcome: Progressing Towards Goal  Goal: Medications  Outcome: Progressing Towards Goal  Goal: Respiratory  Outcome: Progressing Towards Goal  Goal: Treatments/Interventions/Procedures  Outcome: Progressing Towards Goal  Goal: Psychosocial  Outcome: Progressing Towards Goal  Goal: *Oxygen saturation within defined limits  Outcome: Progressing Towards Goal  Goal: *Hemodynamically stable  Outcome: Progressing Towards Goal  Goal: *Optimal pain control at patient's stated goal  Outcome: Progressing Towards Goal  Goal: *Anxiety reduced or absent  Outcome: Progressing Towards Goal Problem: Heart Failure: Day 2  Goal: Off Pathway (Use only if patient is Off Pathway)  Outcome: Progressing Towards Goal  Goal: Activity/Safety  Outcome: Progressing Towards Goal  Goal: Consults, if ordered  Outcome: Progressing Towards Goal  Goal: Diagnostic Test/Procedures  Outcome: Progressing Towards Goal  Goal: Nutrition/Diet  Outcome: Progressing Towards Goal  Goal: Discharge Planning  Outcome: Progressing Towards Goal  Goal: Medications  Outcome: Progressing Towards Goal  Goal: Respiratory  Outcome: Progressing Towards Goal  Goal: Treatments/Interventions/Procedures  Outcome: Progressing Towards Goal  Goal: Psychosocial  Outcome: Progressing Towards Goal  Goal: *Oxygen saturation within defined limits  Outcome: Progressing Towards Goal  Goal: *Hemodynamically stable  Outcome: Progressing Towards Goal  Goal: *Optimal pain control at patient's stated goal  Outcome: Progressing Towards Goal  Goal: *Anxiety reduced or absent  Outcome: Progressing Towards Goal  Goal: *Demonstrates progressive activity  Outcome: Progressing Towards Goal     Problem: Heart Failure: Day 3  Goal: Off Pathway (Use only if patient is Off Pathway)  Outcome: Progressing Towards Goal  Goal: Activity/Safety  Outcome: Progressing Towards Goal  Goal: Diagnostic Test/Procedures  Outcome: Progressing Towards Goal  Goal: Nutrition/Diet  Outcome: Progressing Towards Goal  Goal: Discharge Planning  Outcome: Progressing Towards Goal  Goal: Medications  Outcome: Progressing Towards Goal  Goal: Respiratory  Outcome: Progressing Towards Goal  Goal: Treatments/Interventions/Procedures  Outcome: Progressing Towards Goal  Goal: Psychosocial  Outcome: Progressing Towards Goal  Goal: *Oxygen saturation within defined limits  Outcome: Progressing Towards Goal  Goal: *Hemodynamically stable  Outcome: Progressing Towards Goal  Goal: *Optimal pain control at patient's stated goal  Outcome: Progressing Towards Goal  Goal: *Anxiety reduced or absent  Outcome: Progressing Towards Goal  Goal: *Demonstrates progressive activity  Outcome: Progressing Towards Goal     Problem: Heart Failure: Day 4  Goal: Off Pathway (Use only if patient is Off Pathway)  Outcome: Progressing Towards Goal  Goal: Activity/Safety  Outcome: Progressing Towards Goal  Goal: Diagnostic Test/Procedures  Outcome: Progressing Towards Goal  Goal: Nutrition/Diet  Outcome: Progressing Towards Goal  Goal: Discharge Planning  Outcome: Progressing Towards Goal  Goal: Medications  Outcome: Progressing Towards Goal  Goal: Respiratory  Outcome: Progressing Towards Goal  Goal: Treatments/Interventions/Procedures  Outcome: Progressing Towards Goal  Goal: Psychosocial  Outcome: Progressing Towards Goal  Goal: *Oxygen saturation within defined limits  Outcome: Progressing Towards Goal  Goal: *Hemodynamically stable  Outcome: Progressing Towards Goal  Goal: *Optimal pain control at patient's stated goal  Outcome: Progressing Towards Goal  Goal: *Anxiety reduced or absent  Outcome: Progressing Towards Goal  Goal: *Demonstrates progressive activity  Outcome: Progressing Towards Goal     Problem: Heart Failure: Day 5  Goal: Off Pathway (Use only if patient is Off Pathway)  Outcome: Progressing Towards Goal  Goal: Activity/Safety  Outcome: Progressing Towards Goal  Goal: Diagnostic Test/Procedures  Outcome: Progressing Towards Goal  Goal: Nutrition/Diet  Outcome: Progressing Towards Goal  Goal: Discharge Planning  Outcome: Progressing Towards Goal  Goal: Medications  Outcome: Progressing Towards Goal  Goal: Respiratory  Outcome: Progressing Towards Goal  Goal: Treatments/Interventions/Procedures  Outcome: Progressing Towards Goal  Goal: Psychosocial  Outcome: Progressing Towards Goal     Problem: Heart Failure: Discharge Outcomes  Goal: *Demonstrates ability to perform prescribed activity without shortness of breath or discomfort  Outcome: Progressing Towards Goal  Goal: *Left ventricular function assessment completed prior to or during stay, or planned for post-discharge  Outcome: Progressing Towards Goal  Goal: *ACEI prescribed if LVEF less than 40% and no contraindications or ARB prescribed  Outcome: Progressing Towards Goal  Goal: *Verbalizes understanding and describes prescribed diet  Outcome: Progressing Towards Goal  Goal: *Verbalizes understanding/describes prescribed medications  Outcome: Progressing Towards Goal  Goal: *Describes available resources and support systems  Description: (eg: Home Health, Palliative Care, [employer] Directive)  Outcome: Progressing Towards Goal  Goal: *Describes smoking cessation resources  Outcome: Progressing Towards Goal  Goal: *Understands and describes signs and symptoms to report to providers(Stroke Metric)  Outcome: Progressing Towards Goal  Goal: *Describes/verbalizes understanding of follow-up/return appt  Description: (eg: to physicians, diabetes treatment coordinator, and other resources  Outcome: Progressing Towards Goal  Goal: *Describes importance of continuing daily weights and changes to report to physician  Outcome: Progressing Towards Goal

## 2023-01-05 NOTE — PROGRESS NOTES
Cardiology Progress Note        Patient: Ryan Parrish        Sex: female          DOA: 1/2/2023  YOB: 1927      Age:  80 y.o.        LOS:  LOS: 3 days   Assessment/Plan     Principal Problem:    Acute exacerbation of CHF (congestive heart failure) ([facility] [address] (1/2/2023)    Active Problems:    HTN (hypertension) (1/2/2023)      Hypothyroidism (1/2/2023)      New onset atrial fibrillation ([facility] [address] (1/2/2023)      Aortic stenosis (1/2/2023)      Hypomagnesemia (1/2/2023)      Plan:    Cardiac telemetry with PVCs  Patient is complaining of cough on and off with minimal sputum  DC valsartan  Increase metoprolol titrate from 12.5 mg to 25 mg twice daily  Discussed with patient. I have also tried to call her daughter again today unable to get connected. Discussed with patient and daughter in the room  Daughter at this point agreed not to start therapeutic anticoagulation  Continue with current dose of metoprolol tartrate 25 mg twice daily  Continue with diuretics Lasix                  Subjective:    cc:  Shortness of breath        REVIEW OF SYSTEMS:     General: No fevers or chills. Cardiovascular: No chest pain or pressure. No palpitations. No ankle swelling  Pulmonary: No SOB, orthopnea, PND  Gastrointestinal: No nausea, vomiting or diarrhea      Objective:      Visit Vitals  /68   Pulse 86   Temp 97.9 °F (36.6 °C)   Resp 18   Ht 5' 1\" (1.549 m)   Wt 70.3 kg (154 lb 15.7 oz)   SpO2 93%   BMI 29.28 kg/m²     Body mass index is 29.28 kg/m². Physical Exam:  General Appearance: Comfortable, not using accessory muscles of respiration. NECK: No JVD, no thyroidomeglay. LUNGS: Clear bilaterally. HEART: S1+S2 audible, grade 3 ejection systolic murmur at aortic area    ABD: Non-tender, BS Audible    EXT: No edema, and no cysnosis. VASCULAR EXAM: Pulses are intact. PSYCHIATRIC EXAM: Mood is appropriate.     Medication:  Current Facility-Administered Medications Medication Dose Route Frequency    metoprolol tartrate (LOPRESSOR) tablet 25 mg  25 mg Oral Q12H    levothyroxine (SYNTHROID) tablet 100 mcg  100 mcg Oral ACB    pantoprazole (PROTONIX) tablet 40 mg  40 mg Oral ACB    calcium-vitamin D (OS-NOÉ +D3) 500 mg-200 unit per tablet 1 Tablet  1 Tablet Oral DAILY    cyanocobalamin (VITAMIN B12) tablet 250 mcg  250 mcg Oral DAILY    benzonatate (TESSALON) capsule 100 mg  100 mg Oral TID PRN    sodium chloride (NS) flush 5-40 mL  5-40 mL IntraVENous Q8H    sodium chloride (NS) flush 5-40 mL  5-40 mL IntraVENous PRN    acetaminophen (TYLENOL) tablet 650 mg  650 mg Oral Q6H PRN    Or    acetaminophen (TYLENOL) suppository 650 mg  650 mg Rectal Q6H PRN    polyethylene glycol (MIRALAX) packet 17 g  17 g Oral DAILY PRN    ondansetron (ZOFRAN ODT) tablet 4 mg  4 mg Oral Q8H PRN    Or    ondansetron (ZOFRAN) injection 4 mg  4 mg IntraVENous Q6H PRN    furosemide (LASIX) injection 40 mg  40 mg IntraVENous BID    enoxaparin (LOVENOX) injection 70 mg  1 mg/kg SubCUTAneous DAILY               Lab/Data Reviewed:  Procedures/imaging: see electronic medical records for all procedures/Xrays   and details which were not copied into this note but were reviewed prior to creation of Plan       All lab results for the last 24 hours reviewed.      Recent Labs     01/05/23  0600 01/04/23  0353 01/03/23 0228   WBC 8.7 10.1 8.9   HGB 12.3 11.6* 11.6*   HCT 36.8 35.7 34.9*    317 294       Recent Labs     01/05/23  0600 01/04/23  0353 01/03/23 0228    136 138   K 3.4* 3.7 3.7    101 104   CO2 28 28 28   * 105* 108*   BUN 18 16 13   CREA 0.94 0.92 0.77   CA 9.0 9.0 8.5         RADIOLOGY:  CT Results  (Last 48 hours)      None          CXR Results  (Last 48 hours)      None              Cardiology Procedures:   Results for orders placed or performed during the hospital encounter of 01/02/23   EKG, 12 LEAD, INITIAL   Result Value Ref Range    Ventricular Rate 97 BPM    QRS Duration 110 ms    Q-T Interval 394 ms    QTC Calculation (Bezet) 500 ms    Calculated R Axis 89 degrees    Calculated T Axis -92 degrees    Diagnosis       Atrial fibrillation with a competing junctional pacemaker with premature   ventricular or aberrantly conducted complexes  Septal infarct , age undetermined  Marked ST abnormality, possible inferolateral subendocardial injury  Abnormal ECG  When compared with ECG of 22-MAR-2022 13:25,  Atrial fibrillation has replaced Sinus rhythm  Right bundle branch block is no longer present  Septal infarct is now present  Confirmed by Tang Jeong MD. ([id number]) on 1/3/2023 10:13:42 PM        Echo Results  (Last 48 hours)      None         Cardiolite (Tc-99m Sestamibi) stress test    Signed By: Samuel Rendon MD     January 5, 2023

## 2023-01-05 NOTE — PROGRESS NOTES
D/c plan: Return to [place]     CM rounded w/ Dr. Tonya Linn. Anticipate d/c in 24 to 48 hours. Discussed PT/OT evals. Dr. Tonya Linn and pt are in agreement. Orders placed. CM to follow.

## 2023-01-06 LAB — MAGNESIUM SERPL-MCNC: 1.4 MG/DL (ref 1.6–2.6)

## 2023-01-06 PROCEDURE — 97165 OT EVAL LOW COMPLEX 30 MIN: CPT

## 2023-01-06 PROCEDURE — 74011250636 HC RX REV CODE- 250/636: Performed by: HOSPITALIST

## 2023-01-06 PROCEDURE — 74011250637 HC RX REV CODE- 250/637: Performed by: INTERNAL MEDICINE

## 2023-01-06 PROCEDURE — 97162 PT EVAL MOD COMPLEX 30 MIN: CPT

## 2023-01-06 PROCEDURE — 74011250637 HC RX REV CODE- 250/637: Performed by: HOSPITALIST

## 2023-01-06 PROCEDURE — 65270000046 HC RM TELEMETRY

## 2023-01-06 PROCEDURE — 97116 GAIT TRAINING THERAPY: CPT

## 2023-01-06 PROCEDURE — 74011250636 HC RX REV CODE- 250/636: Performed by: FAMILY MEDICINE

## 2023-01-06 PROCEDURE — 83735 ASSAY OF MAGNESIUM: CPT

## 2023-01-06 PROCEDURE — 36415 COLL VENOUS BLD VENIPUNCTURE: CPT

## 2023-01-06 PROCEDURE — 74011000250 HC RX REV CODE- 250: Performed by: FAMILY MEDICINE

## 2023-01-06 PROCEDURE — 74011250637 HC RX REV CODE- 250/637: Performed by: FAMILY MEDICINE

## 2023-01-06 PROCEDURE — 97535 SELF CARE MNGMENT TRAINING: CPT

## 2023-01-06 RX ORDER — POTASSIUM CHLORIDE 20 MEQ/1
20 TABLET, EXTENDED RELEASE ORAL DAILY
Status: DISCONTINUED | OUTPATIENT
Start: 2023-01-06 | End: 2023-01-09

## 2023-01-06 RX ORDER — MAGNESIUM SULFATE 1 G/100ML
1 INJECTION INTRAVENOUS ONCE
Status: COMPLETED | OUTPATIENT
Start: 2023-01-06 | End: 2023-01-06

## 2023-01-06 RX ADMIN — POTASSIUM CHLORIDE 20 MEQ: 1500 TABLET, EXTENDED RELEASE ORAL at 17:14

## 2023-01-06 RX ADMIN — FUROSEMIDE 40 MG: 10 INJECTION, SOLUTION INTRAMUSCULAR; INTRAVENOUS at 21:04

## 2023-01-06 RX ADMIN — Medication 1 TABLET: at 08:47

## 2023-01-06 RX ADMIN — LEVOTHYROXINE SODIUM 100 MCG: 0.1 TABLET ORAL at 06:03

## 2023-01-06 RX ADMIN — ENOXAPARIN SODIUM 70 MG: 100 INJECTION SUBCUTANEOUS at 08:47

## 2023-01-06 RX ADMIN — SODIUM CHLORIDE, PRESERVATIVE FREE 10 ML: 5 INJECTION INTRAVENOUS at 06:03

## 2023-01-06 RX ADMIN — BENZONATATE 100 MG: 100 CAPSULE ORAL at 08:47

## 2023-01-06 RX ADMIN — FUROSEMIDE 40 MG: 10 INJECTION, SOLUTION INTRAMUSCULAR; INTRAVENOUS at 08:47

## 2023-01-06 RX ADMIN — METOPROLOL TARTRATE 25 MG: 25 TABLET, FILM COATED ORAL at 08:47

## 2023-01-06 RX ADMIN — MAGNESIUM SULFATE HEPTAHYDRATE 1 G: 1 INJECTION, SOLUTION INTRAVENOUS at 11:48

## 2023-01-06 RX ADMIN — SODIUM CHLORIDE, PRESERVATIVE FREE 10 ML: 5 INJECTION INTRAVENOUS at 21:12

## 2023-01-06 RX ADMIN — BENZONATATE 100 MG: 100 CAPSULE ORAL at 17:25

## 2023-01-06 RX ADMIN — ACETAMINOPHEN 650 MG: 325 TABLET ORAL at 21:11

## 2023-01-06 RX ADMIN — CYANOCOBALAMIN TAB 500 MCG 250 MCG: 500 TAB at 08:48

## 2023-01-06 RX ADMIN — PANTOPRAZOLE SODIUM 40 MG: 40 TABLET, DELAYED RELEASE ORAL at 06:03

## 2023-01-06 RX ADMIN — METOPROLOL TARTRATE 25 MG: 25 TABLET, FILM COATED ORAL at 21:04

## 2023-01-06 NOTE — PROGRESS NOTES
Problem: Mobility Impaired (Adult and Pediatric)  Goal: *Acute Goals and Plan of Care (Insert Text)  Description: Physical Therapy Goals   Initiated 1/6/2023 and to be accomplished within 7 day(s)  1. Patient will move from supine <> sit with mod I in prep for out of bed activity and change of position. 2.  Patient will perform sit<> stand with S with RW in prep for transfers/ambulation. 3.  Patient will transfer from bed <> chair with S with RW for time up in chair for completion of ADL activity. 4.  Patient will ambulate 100 feet with S/RW for improved functional mobility at discharge. Outcome: Progressing Towards Goal    PHYSICAL THERAPY EVALUATION    Patient: Kymberly Sebastian (40 y.o. female)  Date: 1/6/2023  Primary Diagnosis: CHF (congestive heart failure) ([facility] [address] [I50.9]  Precautions: Fall  PLOF: amb with FWW, living at [facility], walks to meals in dining room mod I.  Uses bed rail (anchored between mattress and box spring) for assist with transition supine <>sit. ASSESSMENT :  Based on the objective data described below, the patient is seen on telemetry unit. Pt presents supine in bed, A&Ox4, bilat hearing aides and pur wick in place,   Pt with decr'd independence in functional mobility r/t decr'd functional strength LE's, and decr'd activity tolerance with above dx (note chronic cough (non productive) during session. Pt seen with OT for second set of skilled hands. Reports no pain at this time. Demonstrates transition to sit EOB with SBA using bed rail with HOB elevated, transfers to stand with CGA and able to participate with GT/RW/CGA ~28 ft in room. Patricia slow with decr'd foot clearance. Returned to bed with CGA and left with HOB elevated (due to coughing), all needs in reach, and nurse Seema Cha notified. Pt educated in PT POC. Recommend SNF for follow up physical therapy upon discharge to reach maximal level of independence/safety with functional mobility prior to return to [place].      Pt Education: Role of physical therapy in acute care setting, fall prevention and safety/technique during functional mobility tasks    Patient will benefit from skilled intervention to address the above impairments. Patient's rehabilitation potential is considered to be Good  Factors which may influence rehabilitation potential include:   []         None noted  []         Mental ability/status  [x]         Medical condition  []         Home/family situation and support systems  []         Safety awareness  []         Pain tolerance/management  []         Other:      PLAN :  Recommendations and Planned Interventions:   [x]           Bed Mobility Training             []    Neuromuscular Re-Education  [x]           Transfer Training                   []    Orthotic/Prosthetic Training  [x]           Gait Training                          []    Modalities  [x]           Therapeutic Exercises           []    Edema Management/Control  [x]           Therapeutic Activities            []    Family Training/Education  [x]           Patient Education  []           Other (comment):    Frequency/Duration: Patient will be followed by physical therapy 1-2 times per day/4-7 days per week to address goals. Discharge Recommendations: Skilled Nursing Facility  Further Equipment Recommendations for Discharge: N/A    AMPAC: 15/20    This AMPAC score should be considered in conjunction with interdisciplinary team recommendations to determine the most appropriate discharge setting. Patient's social support, diagnosis, medical stability, and prior level of function should also be taken into consideration. SUBJECTIVE:   Patient stated I'm okay.     OBJECTIVE DATA SUMMARY:     Past Medical History:   Diagnosis Date    Cancer ([facility] [address]     Diverticulitis     Hypertension     Hypothyroid      Past Surgical History:   Procedure Laterality Date    HX MASTECTOMY      right    HX ORTHOPAEDIC      shoulder     Barriers to Learning/Limitations: yes; physical  Compensate with: Visual Cues, Verbal Cues, and Tactile Cues  Home Situation:  Home Situation  Home Environment: Assisted living  [address] Name: Brian Parr  One/Two Story Residence: One story  Living Alone: No  Support Systems: Assisted Living  Patient Expects to be Discharged to[de-identified] Assisted Living  Current DME Used/Available at Home: Elverna Corti, rolling, Grab bars, Cane, straight  Tub or Shower Type: Shower (built in bench)  Critical Behavior:  Neurologic State: Alert; Appropriate for age  Orientation Level: Oriented X4  Cognition: Appropriate decision making; Appropriate for age attention/concentration; Follows commands  Psychosocial  Patient Behaviors: Calm; Cooperative  Needs Expressed: Educational  Purposeful Interaction: Yes  Pt Identified Daily Priority: Clinical issues (comment)  Caritas Process: Nurture loving kindness;Establish trust;Nurture spiritual self;Teaching/learning; Attend basic human needs;Create healing environment  Caring Interventions: Reassure; Therapeutic modalities  Reassure: Therapeutic listening; Informing; Acceptance;Caring rounds;Quiet presence  Therapeutic Modalities: Humor  Strength:    Strength: Generally decreased, functional  Tone & Sensation:   Sensation: Intact  Range Of Motion:  AROM: Generally decreased, functional (L TSR; R shd limited ROM)  Functional Mobility:  Bed Mobility:  Supine to Sit: Stand-by assistance;Bed Modified (HOB elevated)  Sit to Supine: Contact guard assistance  Transfers:  Sit to Stand: Contact guard assistance  Stand to Sit: Contact guard assistance  Balance:   Sitting: Intact  Standing: Intact; With support  Ambulation/Gait Training:  Distance (ft): 28 Feet (ft)  Assistive Device: Gait belt;Walker, rolling  Ambulation - Level of Assistance: Contact guard assistance  Gait Description (WDL): Exceptions to WDL  Gait Abnormalities: Decreased step clearance  Speed/Patricia: Slow  Step Length: Right shortened;Left shortened  Interventions: Safety awareness training  Pain:  Pain level pre-treatment: 0/10   Pain level post-treatment: 0/10   Pain Intervention(s) : Medication (see MAR); Rest, Ice, Repositioning  Response to intervention: Nurse notified, See doc flow  Activity Tolerance:   Fair   Please refer to the flowsheet for vital signs taken during this treatment. After treatment:   []         Patient left in no apparent distress sitting up in chair  [x]         Patient left in no apparent distress in bed  [x]         Call bell left within reach  [x]         Nursing notified  []         Caregiver present  []         Bed alarm activated  []         SCDs applied    COMMUNICATION/EDUCATION:   [x]         Role of Physical Therapy in the acute care setting. [x]         Fall prevention education was provided and the patient/caregiver indicated understanding. [x]         Patient/family have participated as able in goal setting and plan of care. [x]         Patient/family agree to work toward stated goals and plan of care. []         Patient understands intent and goals of therapy, but is neutral about his/her participation. []         Patient is unable to participate in goal setting/plan of care: ongoing with therapy staff.  []         Other:     Thank you for this referral.  Paola Lawrence, PT   Time Calculation: 31 mins      Eval Complexity: History: HIGH Complexity :3+ comorbidities / personal factors will impact the outcome/ POC Exam:MEDIUM Complexity : 3 Standardized tests and measures addressing body structure, function, activity limitation and / or participation in recreation  Presentation: MEDIUM Complexity : Evolving with changing characteristics  Clinical Decision Making:Medium Complexity    Overall Complexity:MEDIUM    [employer] AM-PAC® Basic Mobility Inpatient Short Form (6-Clicks) Version 2    How much HELP from another person does the patient currently need    (If the patient hasn't done an activity recently, how much help from another person do you think he/she would need if he/she tried?)   Total (Total A or Dep)   A Lot  (Mod to Max A)   A Little (Sup or Min A)   None (Mod I to I)   Turning from your back to your side while in a flat bed without using bedrails? [] 1 [] 2 [x] 3 [] 4   2. Moving from lying on your back to sitting on the side of a flat bed without using bedrails? [] 1 [] 2 [x] 3 [] 4   3. Moving to and from a bed to a chair (including a wheelchair)? [] 1 [] 2 [x] 3 [] 4   4. Standing up from a chair using your arms (e.g., wheelchair, or bedside chair)? [] 1 [] 2 [x] 3 [] 4   5. Walking in hospital room? [] 1 [] 2 [x] 3 [] 4   6. Climbing 3-5 steps with a railing?+   [] 1 [] 2 [] 3 [] 4   +If stair climbing cannot be assessed, skip item #6. Sum responses from items 1-5. Based on an AM-PAC score of /24 (15/20 if omitting stairs) and their current functional mobility deficits, it is recommended that the patient have 3-5 sessions per week of Physical Therapy at d/c to increase the patient's independence.

## 2023-01-06 NOTE — PROGRESS NOTES
D/c plan: Return to [address] [facility] at [facility] on the [place] w/ [facility] vs SNF pending medical progression. PT/OT evals are pending. Pt lives at [facility] on the [address]. Pt ambulates w/ a RW. Pt and family are in agreement w/ SNF if PT/OT recommend SNF. They are in agreement w/ referrals being sent to the  area facilities and they will choose one from the accepting facilities if SNF is recommended. If the pt will return home to [place] she will need [facility] FOC. CM to follow. Care Management Interventions  PCP Verified by CM: Yes  Mode of Transport at Discharge: Other (see comment) (family to transport)  Transition of Care Consult (CM Consult):  ([place] w/ HH vs SNF pending medical progression. )  Discharge Durable Medical Equipment: No (Pt has a RW.)  Health Maintenance Reviewed: Yes  Physical Therapy Consult: Yes  Occupational Therapy Consult: Yes  Support Systems: Child(ana)  Confirm Follow Up Transport: Family  The Plan for Transition of Care is Related to the Following Treatment Goals : AL at [facility] on the [place] vs SNF pending medical progression.   The Patient and/or Patient Representative was Provided with a Choice of Provider and Agrees with the Discharge Plan?: Yes  Freedom of Choice List was Provided with Basic Dialogue that Supports the Patient's Individualized Plan of Care/Goals, Treatment Preferences and Shares the Quality Data Associated with the Providers?: Yes  Discharge Location  Patient Expects to be Discharged to[de-identified] Assisted Living

## 2023-01-06 NOTE — PROGRESS NOTES
Cardiology Progress Note        Patient: Evy Gaytan        Sex: female          DOA: 1/2/2023  YOB: 1927      Age:  80 y.o.        LOS:  LOS: 4 days   Assessment/Plan     Principal Problem:    Acute exacerbation of CHF (congestive heart failure) ([facility] [address] (1/2/2023)    Active Problems:    HTN (hypertension) (1/2/2023)      Hypothyroidism (1/2/2023)      New onset atrial fibrillation ([facility] [address] (1/2/2023)      Aortic stenosis (1/2/2023)      Hypomagnesemia (1/2/2023)      Plan:    1/6/2022  Sitting on the side of the bed  No chest pain  Tolerating beta-blocker  Continue with metoprolol tartrate 25 mg twice daily  Continue with Lasix  Patient and daughter does not want therapeutic anticoagulation for paroxysmal atrial fibrillation due to history of recurrent fall/advanced age  Please call on-call cardiologist if any questions or concerns during the weekend  Discussed with the patient and daughter    Cardiac telemetry with PVCs  Patient is complaining of cough on and off with minimal sputum  DC valsartan  Increase metoprolol titrate from 12.5 mg to 25 mg twice daily  Discussed with patient. I have also tried to call her daughter again today unable to get connected. Discussed with patient and daughter in the room  Daughter at this point agreed not to start therapeutic anticoagulation  Continue with current dose of metoprolol tartrate 25 mg twice daily  Continue with diuretics Lasix                  Subjective:    cc:  Shortness of breath        REVIEW OF SYSTEMS:     General: No fevers or chills. Cardiovascular: No chest pain or pressure. No palpitations. No ankle swelling  Pulmonary: No SOB, orthopnea, PND  Gastrointestinal: No nausea, vomiting or diarrhea      Objective:      Visit Vitals  BP (!) 138/57   Pulse 72   Temp 97.9 °F (36.6 °C)   Resp 18   Ht 5' 1\" (1.549 m)   Wt 71.4 kg (157 lb 6.5 oz)   SpO2 97%   BMI 29.74 kg/m²     Body mass index is 29.74 kg/m².     Physical Exam:  General Appearance: Comfortable, not using accessory muscles of respiration. NECK: No JVD, no thyroidomeglay. LUNGS: Clear bilaterally. HEART: S1+S2 audible, grade 3 ejection systolic murmur at aortic area    ABD: Non-tender, BS Audible    EXT: No edema, and no cysnosis. VASCULAR EXAM: Pulses are intact. PSYCHIATRIC EXAM: Mood is appropriate. Medication:  Current Facility-Administered Medications   Medication Dose Route Frequency    potassium chloride (K-DUR, KLOR-CON M20) SR tablet 20 mEq  20 mEq Oral DAILY    metoprolol tartrate (LOPRESSOR) tablet 25 mg  25 mg Oral Q12H    levothyroxine (SYNTHROID) tablet 100 mcg  100 mcg Oral ACB    pantoprazole (PROTONIX) tablet 40 mg  40 mg Oral ACB    calcium-vitamin D (OS-NOÉ +D3) 500 mg-200 unit per tablet 1 Tablet  1 Tablet Oral DAILY    cyanocobalamin (VITAMIN B12) tablet 250 mcg  250 mcg Oral DAILY    benzonatate (TESSALON) capsule 100 mg  100 mg Oral TID PRN    sodium chloride (NS) flush 5-40 mL  5-40 mL IntraVENous Q8H    sodium chloride (NS) flush 5-40 mL  5-40 mL IntraVENous PRN    acetaminophen (TYLENOL) tablet 650 mg  650 mg Oral Q6H PRN    Or    acetaminophen (TYLENOL) suppository 650 mg  650 mg Rectal Q6H PRN    polyethylene glycol (MIRALAX) packet 17 g  17 g Oral DAILY PRN    ondansetron (ZOFRAN ODT) tablet 4 mg  4 mg Oral Q8H PRN    Or    ondansetron (ZOFRAN) injection 4 mg  4 mg IntraVENous Q6H PRN    furosemide (LASIX) injection 40 mg  40 mg IntraVENous BID    enoxaparin (LOVENOX) injection 70 mg  1 mg/kg SubCUTAneous DAILY               Lab/Data Reviewed:  Procedures/imaging: see electronic medical records for all procedures/Xrays   and details which were not copied into this note but were reviewed prior to creation of Plan       All lab results for the last 24 hours reviewed.      Recent Labs     01/05/23  0600 01/04/23 0353   WBC 8.7 10.1   HGB 12.3 11.6*   HCT 36.8 35.7    317       Recent Labs     01/05/23  0600 01/04/23 0353  136   K 3.4* 3.7    101   CO2 28 28   * 105*   BUN 18 16   CREA 0.94 0.92   CA 9.0 9.0         RADIOLOGY:  CT Results  (Last 48 hours)      None          CXR Results  (Last 48 hours)      None              Cardiology Procedures:   Results for orders placed or performed during the hospital encounter of 01/02/23   EKG, 12 LEAD, INITIAL   Result Value Ref Range    Ventricular Rate 97 BPM    QRS Duration 110 ms    Q-T Interval 394 ms    QTC Calculation (Bezet) 500 ms    Calculated R Axis 89 degrees    Calculated T Axis -92 degrees    Diagnosis       Atrial fibrillation with a competing junctional pacemaker with premature   ventricular or aberrantly conducted complexes  Septal infarct , age undetermined  Marked ST abnormality, possible inferolateral subendocardial injury  Abnormal ECG  When compared with ECG of 22-MAR-2022 13:25,  Atrial fibrillation has replaced Sinus rhythm  Right bundle branch block is no longer present  Septal infarct is now present  Confirmed by Todd Baker MD. ([id number]) on 1/3/2023 10:13:42 PM        Echo Results  (Last 48 hours)      None         Cardiolite (Tc-99m Sestamibi) stress test    Signed By: Elisha Martini MD     January 6, 2023

## 2023-01-06 NOTE — PROGRESS NOTES
Problem: Falls - Risk of  Goal: *Absence of Falls  Description: Document Jamal Nicole Fall Risk and appropriate interventions in the flowsheet.   Outcome: Progressing Towards Goal  Note: Fall Risk Interventions:  Mobility Interventions: Communicate number of staff needed for ambulation/transfer, Bed/chair exit alarm         Medication Interventions: Bed/chair exit alarm, Patient to call before getting OOB, Teach patient to arise slowly, Evaluate medications/consider consulting pharmacy         History of Falls Interventions: Door open when patient unattended, Evaluate medications/consider consulting pharmacy         Problem: Pain  Goal: *Control of Pain  Outcome: Progressing Towards Goal

## 2023-01-06 NOTE — PROGRESS NOTES
Hospitalist Progress Note    Patient: Nannette Fuller MRN: [id number]  CSN: [id number]    YOB: 1927  Age: 80 y.o. Sex: female    DOA: 1/2/2023 LOS:  LOS: 4 days          Chief Complaint:    SOB      Assessment/Plan     SOB  79 y/o female with HTN, hypothyroidism, and aortic stenosis is admitted for an acute CHF exacerbation with new onset atrial fibrillation. Acute CHF exacerbation-  IV lasix diuresis  Strict I/O  Echocardiogram showed normal left ventricular systolic function, EF of 60 to 65%, normal wall motion severe aortic stenosis  Cardiology following     New onset atrial fibrillation-  rate controlled on lopressor  High risk for fall, not on anticoagulation     Severe Aortic stenosis-  declined TAVR in the past      Hypomagnesemia  IV Mag  Hypokalemia-daily Kdur     HTN  Monitor BP     Hypothyroidism  TSH 6.23  Continue synthroid    D/c plans:likely SNF 1-2 days        Disposition :  Patient Active Problem List   Diagnosis Code    Fall W19. XXXA    Rib contusion, right, initial encounter S20.211A    Right shoulder injury, initial encounter S49. 91XA    Acute exacerbation of CHF (congestive heart failure) (HCC) I50.9    HTN (hypertension) I10    Hypothyroidism E03.9    New onset atrial fibrillation (HCC) I48.91    Aortic stenosis I35.0    Hypomagnesemia E83.42       Subjective:    I feel much better  Denies SOB in chair  Dry cough  No CP or abd pain    Review of systems:    Constitutional: denies fevers, chills, myalgias  Cardiovascular: denies chest pain, palpitations  Gastrointestinal: denies nausea, vomiting, diarrhea      Vital signs/Intake and Output:  Visit Vitals  BP (!) 142/56   Pulse 73   Temp 97.7 °F (36.5 °C)   Resp 18   Ht 5' 1\" (1.549 m)   Wt 71.4 kg (157 lb 6.5 oz)   SpO2 97%   BMI 29.74 kg/m²     Current Shift:  No intake/output data recorded.   Last three shifts:  01/04 1901 - 01/06 0700  In: -   Out: 2400 [Urine:2400]    Exam:    General: elderly pleasant Wf in chair NAD  CVS:Regular rate and rhythm, ANGELITA 2-3/6  Lungs:Clear to auscultation bilaterally, no wheezes, rhonchi, or rales  Abdomen: Soft, Nontender, No distention  Extremities: No C/C/E, pulses palpable 2+  Neuro:grossly normal , follows commands  Psych:appropriate                Labs: Results:       Chemistry Recent Labs     01/05/23  0600 01/04/23  0353   * 105*    136   K 3.4* 3.7    101   CO2 28 28   BUN 18 16   CREA 0.94 0.92   CA 9.0 9.0   AGAP 9 7   BUCR 19 17    102   TP 6.5 6.6   ALB 3.4 3.3*   GLOB 3.1 3.3   AGRAT 1.1 1.0      CBC w/Diff Recent Labs     01/05/23  0600 01/04/23  0353   WBC 8.7 10.1   RBC 3.80* 3.65*   HGB 12.3 11.6*   HCT 36.8 35.7    317      Cardiac Enzymes No results for input(s): CPK, CKND1, ENZO in the last 72 hours. No lab exists for component: CKRMB, TROIP   Coagulation No results for input(s): PTP, INR, APTT, INREXT in the last 72 hours. Lipid Panel No results found for: CHOL, CHOLPOCT, CHOLX, CHLST, CHOLV, 830416, HDL, HDLP, LDL, LDLC, DLDLP, 595487, VLDLC, VLDL, TGLX, TRIGL, TRIGP, TGLPOCT, CHHD, CHHDX   BNP No results for input(s): BNPP in the last 72 hours.    Liver Enzymes Recent Labs     01/05/23  0600   TP 6.5   ALB 3.4         Thyroid Studies Lab Results   Component Value Date/Time    TSH 6.23 (H) 01/02/2023 01:45 PM        Procedures/imaging: see electronic medical records for all procedures/Xrays and details which were not copied into this note but were reviewed prior to creation of Eleuterio Walker MD

## 2023-01-06 NOTE — PROGRESS NOTES
Problem: Self Care Deficits Care Plan (Adult)  Goal: *Acute Goals and Plan of Care (Insert Text)  Description: Occupational Therapy Goals  Initiated 1/6/2023 within 7 day(s). 1.  Patient will perform grooming with supervision/set-up standing at sink for 5 minutes or more. 2.  Patient will perform lower body dressing with supervision/set-up. 3.  Patient will perform toilet transfers with supervision/set-up. 4.  Patient will perform all aspects of toileting with supervision/set-up. 5.  Patient will participate in upper extremity therapeutic exercise/activities with supervision/set-up for 10 minutes. 6.  Patient will utilize energy conservation techniques during functional activities with verbal, visual, and tactile cues. OCCUPATIONAL THERAPY EVALUATION    Patient: Alyx Graham (40 y.o. female)  Date: 1/6/2023  Primary Diagnosis: [facility]congestive heart failure) [facility] [I50.9]       Precautions:   Fall  PLOF: mod I for ADLs and transfers     ASSESSMENT :  Based on the objective data described below, the patient presents with  decreased strength, endurance, and balance for carryover of ADLs and transfers following above mentioned medical diagnosis. Pt presented supine in bed and agrees to participate with therapy. Pt performed bed mobility, supine<>sit, sit<>Stand transfers, LB dressing with min-CGA during this session. Pt was left supine in bed at the end of session in NAD. Pt's call bell and room telephone left within reach. Education: Pt education on safety with transfers, fall prevention and to call for assistance was provided. Pt verbalized understanding for the same. Patient will benefit from skilled intervention to address the above impairments.   Patient's rehabilitation potential is considered to be Good  Factors which may influence rehabilitation potential include:   []             None noted  []             Mental ability/status  [x]             Medical condition  [] Home/family situation and support systems  []             Safety awareness  []             Pain tolerance/management  []             Other:      PLAN :  Recommendations and Planned Interventions:   [x]               Self Care Training                  [x]      Therapeutic Activities  [x]               Functional Mobility Training   []      Cognitive Retraining  [x]               Therapeutic Exercises           [x]      Endurance Activities  [x]               Balance Training                    []      Neuromuscular Re-Education  []               Visual/Perceptual Training     [x]      Home Safety Training  [x]               Patient Education                   [x]      Family Training/Education  []               Other (comment):    Frequency/Duration: Patient will be followed by occupational therapy 3 times a week to address goals. Discharge Recommendations: Skilled Nursing Facility  Further Equipment Recommendations for Discharge: N/A    AMPA: 19/24    This AMPA score should be considered in conjunction with interdisciplinary team recommendations to determine the most appropriate discharge setting. Patient's social support, diagnosis, medical stability, and prior level of function should also be taken into consideration. SUBJECTIVE:   Patient stated I am doing alright.     OBJECTIVE DATA SUMMARY:     Past Medical History:   Diagnosis Date    Cancer ([facility]     Diverticulitis     Hypertension     Hypothyroid      Past Surgical History:   Procedure Laterality Date    HX MASTECTOMY      right    HX ORTHOPAEDIC      shoulder     Barriers to Learning/Limitations: None  Compensate with: visual, verbal, tactile, kinesthetic cues/model    Home Situation:   Home Situation  Home Environment: Assisted living  [address] [facility] [address] Name: [facility]  One/Two Story Residence: One story  Living Alone: No  Support Systems: Child(ana), Assisted Living  Patient Expects to be Discharged to[de-identified] Assisted Living  Current DME Used/Available at Home: Cane, straight, Grab bars, Walker, rolling  Tub or Shower Type: Shower  []  Right hand dominant   []  Left hand dominant    Cognitive/Behavioral Status:  Neurologic State: Alert  Orientation Level: Oriented X4  Cognition: Appropriate for age attention/concentration; Follows commands  Safety/Judgement: Fall prevention    Skin: intact  Edema: none    Vision/Perceptual:    Tracking: Able to track stimulus in all quadrants w/o difficulty    Corrective Lenses: Glasses  Coordination: BUE  Coordination: Within functional limits  Fine Motor Skills-Upper: Left Intact; Right Intact    Gross Motor Skills-Upper: Left Intact; Right Intact  Balance:  Sitting: Intact  Standing: Intact; With support  Strength: BUE  Strength: Generally decreased, functional  Tone & Sensation: BUE  Sensation: Intact  Range of Motion: BUE  AROM: Generally decreased, functional  Functional Mobility and Transfers for ADLs:  Bed Mobility:  Supine to Sit: Stand-by assistance  Sit to Supine: Contact guard assistance  Transfers:  Sit to Stand: Contact guard assistance  Stand to Sit: Contact guard assistance   Toilet Transfer : Contact guard assistance  ADL Assessment:   Feeding: Independent    Oral Facial Hygiene/Grooming: Contact guard assistance    Upper Body Dressing: Supervision    Lower Body Dressing: Minimum assistance    Toileting: Contact guard assistance  ADL Intervention:  Lower Body Dressing Assistance  Dressing Assistance: Minimum assistance  Socks: Minimum assistance  Position Performed: Seated edge of bed  Cues: Don    Cognitive Retraining  Safety/Judgement: Fall prevention  Pain:  Pain level pre-treatment: 0/10   Pain level post-treatment: 0/10   Pain Intervention(s): Medication (see MAR); Rest, Ice, Repositioning   Response to intervention: Nurse notified, See doc flow    Activity Tolerance:   Fair     Please refer to the flowsheet for vital signs taken during this treatment.   After treatment:   [] Patient left in no apparent distress sitting up in chair  [x] Patient left in no apparent distress in bed  [x] Call bell left within reach  [x] Nursing notified  [] Caregiver present  [] Bed alarm activated    COMMUNICATION/EDUCATION:   [x] Role of Occupational Therapy in the acute care setting  [x] Home safety education was provided and the patient/caregiver indicated understanding. [x] Patient/family have participated as able in goal setting and plan of care. [x] Patient/family agree to work toward stated goals and plan of care. [] Patient understands intent and goals of therapy, but is neutral about his/her participation. [] Patient is unable to participate in goal setting and plan of care. Thank you for this referral.  Marie Heredia OTR/L  Time Calculation: 30 mins    Eval Complexity: History: MEDIUM Complexity : Expanded review of history including physical, cognitive and psychosocial  history ; Examination: MEDIUM Complexity : 3-5 performance deficits relating to physical, cognitive , or psychosocial skils that result in activity limitations and / or participation restrictions; Decision Making:MEDIUM Complexity : Patient may present with comorbidities that affect occupational performnce. Miniml to moderate modification of tasks or assistance (eg, physical or verbal ) with assesment(s) is necessary to enable patient to complete evaluation     Adam Martines AM-PAC® Daily Activity Inpatient Short Form (6-Clicks)*    How much HELP from another person does the patient currently need    (If the patient hasn't done an activity recently, how much help from another person do you think he/she would need if he/she tried?)   Total (Total A or Dep)   A Lot  (Mod to Max A)   A Little (Sup or Min A)   None (Mod I to I)   Putting on and taking off regular lower body clothing? [] 1 [] 2 [] 3 [] 4   2. Bathing (including washing, rinsing,      drying)? [] 1 [] 2 [] 3 [] 4   3.  Toileting, which includes using toilet, bedpan or urinal?   [] 1 [] 2 [] 3 [] 4   4. Putting on and taking off regular upper body clothing? [] 1 [] 2 [] 3 [] 4   5. Taking care of personal grooming such as brushing teeth? [] 1 [] 2 [] 3 [] 4   6. Eating meals? [] 1 [] 2 [] 3 [] 4     Based on an AM-PAC score of **/24 and their current ADL deficits; it is recommended that the patient have 5-7 sessions per week of Occupational Therapy at d/c to increase the patient's independence. Currently, this patient demonstrates the potential endurance, and/or tolerance for 3 hours of therapy each day at d/c. Based on an AM-PAC score of **/24 and their current ADL deficits; it is recommended that the patient have 3-5 sessions per week of Occupational Therapy at d/c to increase the patient's independence. Based on an AM-PAC score of **/24 and their current ADL deficits; it is recommended that the patient have 2-3 sessions per week of Occupational Therapy at d/c to increase the patient's independence. At this time and based on an AM-PAC score of **/24, no further OT is recommended upon discharge due to (i.e. patient at baseline functional statusetc). Recommend patient returns to prior setting with prior services.

## 2023-01-07 LAB
ANION GAP SERPL CALC-SCNC: 8 MMOL/L (ref 3–18)
BUN SERPL-MCNC: 20 MG/DL (ref 7–18)
BUN/CREAT SERPL: 22 (ref 12–20)
CALCIUM SERPL-MCNC: 8.4 MG/DL (ref 8.5–10.1)
CHLORIDE SERPL-SCNC: 97 MMOL/L (ref 100–111)
CO2 SERPL-SCNC: 30 MMOL/L (ref 21–32)
CREAT SERPL-MCNC: 0.91 MG/DL (ref 0.6–1.3)
GLUCOSE SERPL-MCNC: 100 MG/DL (ref 74–99)
MAGNESIUM SERPL-MCNC: 1.5 MG/DL (ref 1.6–2.6)
POTASSIUM SERPL-SCNC: 3.5 MMOL/L (ref 3.5–5.5)
SODIUM SERPL-SCNC: 135 MMOL/L (ref 136–145)

## 2023-01-07 PROCEDURE — 36415 COLL VENOUS BLD VENIPUNCTURE: CPT

## 2023-01-07 PROCEDURE — 83735 ASSAY OF MAGNESIUM: CPT

## 2023-01-07 PROCEDURE — 74011000250 HC RX REV CODE- 250: Performed by: FAMILY MEDICINE

## 2023-01-07 PROCEDURE — 74011250637 HC RX REV CODE- 250/637: Performed by: HOSPITALIST

## 2023-01-07 PROCEDURE — 80048 BASIC METABOLIC PNL TOTAL CA: CPT

## 2023-01-07 PROCEDURE — 74011250637 HC RX REV CODE- 250/637: Performed by: INTERNAL MEDICINE

## 2023-01-07 PROCEDURE — 74011250636 HC RX REV CODE- 250/636: Performed by: FAMILY MEDICINE

## 2023-01-07 PROCEDURE — 74011250637 HC RX REV CODE- 250/637: Performed by: FAMILY MEDICINE

## 2023-01-07 PROCEDURE — 65270000046 HC RM TELEMETRY

## 2023-01-07 RX ADMIN — CYANOCOBALAMIN TAB 500 MCG 250 MCG: 500 TAB at 09:48

## 2023-01-07 RX ADMIN — Medication 1 TABLET: at 09:44

## 2023-01-07 RX ADMIN — POLYETHYLENE GLYCOL 3350 17 G: 17 POWDER, FOR SOLUTION ORAL at 18:50

## 2023-01-07 RX ADMIN — FUROSEMIDE 40 MG: 10 INJECTION, SOLUTION INTRAMUSCULAR; INTRAVENOUS at 09:44

## 2023-01-07 RX ADMIN — METOPROLOL TARTRATE 25 MG: 25 TABLET, FILM COATED ORAL at 21:44

## 2023-01-07 RX ADMIN — ACETAMINOPHEN 650 MG: 325 TABLET ORAL at 18:50

## 2023-01-07 RX ADMIN — BENZONATATE 100 MG: 100 CAPSULE ORAL at 09:44

## 2023-01-07 RX ADMIN — LEVOTHYROXINE SODIUM 100 MCG: 0.1 TABLET ORAL at 06:30

## 2023-01-07 RX ADMIN — SODIUM CHLORIDE, PRESERVATIVE FREE 10 ML: 5 INJECTION INTRAVENOUS at 14:06

## 2023-01-07 RX ADMIN — BENZONATATE 100 MG: 100 CAPSULE ORAL at 18:50

## 2023-01-07 RX ADMIN — SODIUM CHLORIDE, PRESERVATIVE FREE 10 ML: 5 INJECTION INTRAVENOUS at 22:00

## 2023-01-07 RX ADMIN — ENOXAPARIN SODIUM 70 MG: 100 INJECTION SUBCUTANEOUS at 09:43

## 2023-01-07 RX ADMIN — SODIUM CHLORIDE, PRESERVATIVE FREE 10 ML: 5 INJECTION INTRAVENOUS at 06:37

## 2023-01-07 RX ADMIN — ACETAMINOPHEN 650 MG: 325 TABLET ORAL at 09:44

## 2023-01-07 RX ADMIN — PANTOPRAZOLE SODIUM 40 MG: 40 TABLET, DELAYED RELEASE ORAL at 06:30

## 2023-01-07 RX ADMIN — FUROSEMIDE 40 MG: 10 INJECTION, SOLUTION INTRAMUSCULAR; INTRAVENOUS at 21:44

## 2023-01-07 RX ADMIN — METOPROLOL TARTRATE 25 MG: 25 TABLET, FILM COATED ORAL at 09:44

## 2023-01-07 RX ADMIN — POTASSIUM CHLORIDE 20 MEQ: 1500 TABLET, EXTENDED RELEASE ORAL at 09:44

## 2023-01-07 NOTE — PROGRESS NOTES
Notified by tele tech pt in v-tach, upon assessment, pt sitting on bedside coughing, pt reports coughing spell \"came out of nowhere\", tessalon admin earlier. Pt will cough briefly, then able to take slow deep breathes. Denies chest pain, SOB, dizziness. Hospitalist on unit, made aware of v-tach and persistent cough.

## 2023-01-07 NOTE — PROGRESS NOTES
Problem: Falls - Risk of  Goal: *Absence of Falls  Description: Document Gama Garcia Fall Risk and appropriate interventions in the flowsheet.   1/7/2023 1229 by James Menezes RN  Outcome: Progressing Towards Goal  1/7/2023 1229 by James Menezes RN  Outcome: Progressing Towards Goal  Note: Fall Risk Interventions:  Mobility Interventions: Communicate number of staff needed for ambulation/transfer, Bed/chair exit alarm         Medication Interventions: Bed/chair exit alarm, Patient to call before getting OOB, Teach patient to arise slowly, Evaluate medications/consider consulting pharmacy         History of Falls Interventions: Door open when patient unattended, Evaluate medications/consider consulting pharmacy         Problem: Patient Education: Go to Patient Education Activity  Goal: Patient/Family Education  1/7/2023 1229 by James Menezes RN  Outcome: Progressing Towards Goal  1/7/2023 1229 by James Menezes RN  Outcome: Progressing Towards Goal     Problem: Pain  Goal: *Control of Pain  1/7/2023 1229 by James Menezes RN  Outcome: Progressing Towards Goal  1/7/2023 1229 by James Menezes RN  Outcome: Progressing Towards Goal     Problem: Patient Education: Go to Patient Education Activity  Goal: Patient/Family Education  1/7/2023 1229 by James Menezes RN  Outcome: Progressing Towards Goal  1/7/2023 1229 by James Menezes RN  Outcome: Progressing Towards Goal     Problem: Patient Education: Go to Patient Education Activity  Goal: Patient/Family Education  Outcome: Progressing Towards Goal     Problem: Heart Failure: Day 1  Goal: Off Pathway (Use only if patient is Off Pathway)  Outcome: Progressing Towards Goal  Goal: Activity/Safety  Outcome: Progressing Towards Goal  Goal: Consults, if ordered  Outcome: Progressing Towards Goal  Goal: Diagnostic Test/Procedures  Outcome: Progressing Towards Goal  Goal: Nutrition/Diet  Outcome: Progressing Towards Goal  Goal: Discharge Planning  Outcome: Progressing Towards Goal  Goal: Medications  Outcome: Progressing Towards Goal  Goal: Respiratory  Outcome: Progressing Towards Goal  Goal: Treatments/Interventions/Procedures  Outcome: Progressing Towards Goal  Goal: Psychosocial  Outcome: Progressing Towards Goal  Goal: *Oxygen saturation within defined limits  Outcome: Progressing Towards Goal  Goal: *Hemodynamically stable  Outcome: Progressing Towards Goal  Goal: *Optimal pain control at patient's stated goal  Outcome: Progressing Towards Goal  Goal: *Anxiety reduced or absent  Outcome: Progressing Towards Goal     Problem: Heart Failure: Day 2  Goal: Off Pathway (Use only if patient is Off Pathway)  Outcome: Progressing Towards Goal  Goal: Activity/Safety  Outcome: Progressing Towards Goal  Goal: Consults, if ordered  Outcome: Progressing Towards Goal  Goal: Diagnostic Test/Procedures  Outcome: Progressing Towards Goal  Goal: Nutrition/Diet  Outcome: Progressing Towards Goal  Goal: Discharge Planning  Outcome: Progressing Towards Goal  Goal: Medications  Outcome: Progressing Towards Goal  Goal: Respiratory  Outcome: Progressing Towards Goal  Goal: Treatments/Interventions/Procedures  Outcome: Progressing Towards Goal  Goal: Psychosocial  Outcome: Progressing Towards Goal  Goal: *Oxygen saturation within defined limits  Outcome: Progressing Towards Goal  Goal: *Hemodynamically stable  Outcome: Progressing Towards Goal  Goal: *Optimal pain control at patient's stated goal  Outcome: Progressing Towards Goal  Goal: *Anxiety reduced or absent  Outcome: Progressing Towards Goal  Goal: *Demonstrates progressive activity  Outcome: Progressing Towards Goal     Problem: Heart Failure: Day 3  Goal: Off Pathway (Use only if patient is Off Pathway)  Outcome: Progressing Towards Goal  Goal: Activity/Safety  Outcome: Progressing Towards Goal  Goal: Diagnostic Test/Procedures  Outcome: Progressing Towards Goal  Goal: Nutrition/Diet  Outcome: Progressing Towards Goal  Goal: Discharge Planning  Outcome: Progressing Towards Goal  Goal: Medications  Outcome: Progressing Towards Goal  Goal: Respiratory  Outcome: Progressing Towards Goal  Goal: Treatments/Interventions/Procedures  Outcome: Progressing Towards Goal  Goal: Psychosocial  Outcome: Progressing Towards Goal  Goal: *Oxygen saturation within defined limits  Outcome: Progressing Towards Goal  Goal: *Hemodynamically stable  Outcome: Progressing Towards Goal  Goal: *Optimal pain control at patient's stated goal  Outcome: Progressing Towards Goal  Goal: *Anxiety reduced or absent  Outcome: Progressing Towards Goal  Goal: *Demonstrates progressive activity  Outcome: Progressing Towards Goal     Problem: Heart Failure: Day 4  Goal: Off Pathway (Use only if patient is Off Pathway)  Outcome: Progressing Towards Goal  Goal: Activity/Safety  Outcome: Progressing Towards Goal  Goal: Diagnostic Test/Procedures  Outcome: Progressing Towards Goal  Goal: Nutrition/Diet  Outcome: Progressing Towards Goal  Goal: Discharge Planning  Outcome: Progressing Towards Goal  Goal: Medications  Outcome: Progressing Towards Goal  Goal: Respiratory  Outcome: Progressing Towards Goal  Goal: Treatments/Interventions/Procedures  Outcome: Progressing Towards Goal  Goal: Psychosocial  Outcome: Progressing Towards Goal  Goal: *Oxygen saturation within defined limits  Outcome: Progressing Towards Goal  Goal: *Hemodynamically stable  Outcome: Progressing Towards Goal  Goal: *Optimal pain control at patient's stated goal  Outcome: Progressing Towards Goal  Goal: *Anxiety reduced or absent  Outcome: Progressing Towards Goal  Goal: *Demonstrates progressive activity  Outcome: Progressing Towards Goal     Problem: Heart Failure: Day 5  Goal: Off Pathway (Use only if patient is Off Pathway)  Outcome: Progressing Towards Goal  Goal: Activity/Safety  Outcome: Progressing Towards Goal  Goal: Diagnostic Test/Procedures  Outcome: Progressing Towards Goal  Goal: Nutrition/Diet  Outcome: Progressing Towards Goal  Goal: Discharge Planning  Outcome: Progressing Towards Goal  Goal: Medications  Outcome: Progressing Towards Goal  Goal: Respiratory  Outcome: Progressing Towards Goal  Goal: Treatments/Interventions/Procedures  Outcome: Progressing Towards Goal  Goal: Psychosocial  Outcome: Progressing Towards Goal     Problem: Heart Failure: Discharge Outcomes  Goal: *Demonstrates ability to perform prescribed activity without shortness of breath or discomfort  Outcome: Progressing Towards Goal  Goal: *Left ventricular function assessment completed prior to or during stay, or planned for post-discharge  Outcome: Progressing Towards Goal  Goal: *ACEI prescribed if LVEF less than 40% and no contraindications or ARB prescribed  Outcome: Progressing Towards Goal  Goal: *Verbalizes understanding and describes prescribed diet  Outcome: Progressing Towards Goal  Goal: *Verbalizes understanding/describes prescribed medications  Outcome: Progressing Towards Goal  Goal: *Describes available resources and support systems  Description: (eg: Home Health, Palliative Care, Advanced Medical Directive)  Outcome: Progressing Towards Goal  Goal: *Describes smoking cessation resources  Outcome: Progressing Towards Goal  Goal: *Understands and describes signs and symptoms to report to providers(Stroke Metric)  Outcome: Progressing Towards Goal  Goal: *Describes/verbalizes understanding of follow-up/return appt  Description: (eg: to physicians, diabetes treatment coordinator, and other resources  Outcome: Progressing Towards Goal  Goal: *Describes importance of continuing daily weights and changes to report to physician  Outcome: Progressing Towards Goal     Problem: Pressure Injury - Risk of  Goal: *Prevention of pressure injury  Description: Document Iggy Scale and appropriate interventions in the flowsheet.   Outcome: Progressing Towards Goal  Note: Pressure Injury Interventions:  Sensory Interventions: Assess changes in LOC, Check visual cues for pain, Float heels, Maintain/enhance activity level, Pressure redistribution bed/mattress (bed type)    Moisture Interventions: Absorbent underpads, Check for incontinence Q2 hours and as needed, Internal/External urinary devices    Activity Interventions: Increase time out of bed, Pressure redistribution bed/mattress(bed type)    Mobility Interventions: HOB 30 degrees or less, Pressure redistribution bed/mattress (bed type)    Nutrition Interventions: Document food/fluid/supplement intake, Offer support with meals,snacks and hydration    Friction and Shear Interventions: Apply protective barrier, creams and emollients, HOB 30 degrees or less, Lift sheet                Problem: Patient Education: Go to Patient Education Activity  Goal: Patient/Family Education  Outcome: Progressing Towards Goal     Problem: Patient Education: Go to Patient Education Activity  Goal: Patient/Family Education  Outcome: Progressing Towards Goal     Problem: Patient Education: Go to Patient Education Activity  Goal: Patient/Family Education  Outcome: Progressing Towards Goal

## 2023-01-07 NOTE — PROGRESS NOTES
Problem: Falls - Risk of  Goal: *Absence of Falls  Description: Document Natalie Skill Fall Risk and appropriate interventions in the flowsheet.   Outcome: Progressing Towards Goal  Note: Fall Risk Interventions:  Mobility Interventions: Communicate number of staff needed for ambulation/transfer, Bed/chair exit alarm         Medication Interventions: Bed/chair exit alarm, Patient to call before getting OOB, Teach patient to arise slowly, Evaluate medications/consider consulting pharmacy         History of Falls Interventions: Door open when patient unattended, Evaluate medications/consider consulting pharmacy         Problem: Patient Education: Go to Patient Education Activity  Goal: Patient/Family Education  Outcome: Progressing Towards Goal     Problem: Pain  Goal: *Control of Pain  Outcome: Progressing Towards Goal     Problem: Patient Education: Go to Patient Education Activity  Goal: Patient/Family Education  Outcome: Progressing Towards Goal     Problem: Patient Education: Go to Patient Education Activity  Goal: Patient/Family Education  Outcome: Progressing Towards Goal     Problem: Heart Failure: Day 1  Goal: Off Pathway (Use only if patient is Off Pathway)  Outcome: Progressing Towards Goal  Goal: Activity/Safety  Outcome: Progressing Towards Goal  Goal: Consults, if ordered  Outcome: Progressing Towards Goal  Goal: Diagnostic Test/Procedures  Outcome: Progressing Towards Goal  Goal: Nutrition/Diet  Outcome: Progressing Towards Goal  Goal: Discharge Planning  Outcome: Progressing Towards Goal  Goal: Medications  Outcome: Progressing Towards Goal  Goal: Respiratory  Outcome: Progressing Towards Goal  Goal: Treatments/Interventions/Procedures  Outcome: Progressing Towards Goal  Goal: Psychosocial  Outcome: Progressing Towards Goal  Goal: *Oxygen saturation within defined limits  Outcome: Progressing Towards Goal  Goal: *Hemodynamically stable  Outcome: Progressing Towards Goal  Goal: *Optimal pain control at patient's stated goal  Outcome: Progressing Towards Goal  Goal: *Anxiety reduced or absent  Outcome: Progressing Towards Goal     Problem: Heart Failure: Day 2  Goal: Off Pathway (Use only if patient is Off Pathway)  Outcome: Progressing Towards Goal  Goal: Activity/Safety  Outcome: Progressing Towards Goal  Goal: Consults, if ordered  Outcome: Progressing Towards Goal  Goal: Diagnostic Test/Procedures  Outcome: Progressing Towards Goal  Goal: Nutrition/Diet  Outcome: Progressing Towards Goal  Goal: Discharge Planning  Outcome: Progressing Towards Goal  Goal: Medications  Outcome: Progressing Towards Goal  Goal: Respiratory  Outcome: Progressing Towards Goal  Goal: Treatments/Interventions/Procedures  Outcome: Progressing Towards Goal  Goal: Psychosocial  Outcome: Progressing Towards Goal  Goal: *Oxygen saturation within defined limits  Outcome: Progressing Towards Goal  Goal: *Hemodynamically stable  Outcome: Progressing Towards Goal  Goal: *Optimal pain control at patient's stated goal  Outcome: Progressing Towards Goal  Goal: *Anxiety reduced or absent  Outcome: Progressing Towards Goal  Goal: *Demonstrates progressive activity  Outcome: Progressing Towards Goal     Problem: Heart Failure: Day 3  Goal: Off Pathway (Use only if patient is Off Pathway)  Outcome: Progressing Towards Goal  Goal: Activity/Safety  Outcome: Progressing Towards Goal  Goal: Diagnostic Test/Procedures  Outcome: Progressing Towards Goal  Goal: Nutrition/Diet  Outcome: Progressing Towards Goal  Goal: Discharge Planning  Outcome: Progressing Towards Goal  Goal: Medications  Outcome: Progressing Towards Goal  Goal: Respiratory  Outcome: Progressing Towards Goal  Goal: Treatments/Interventions/Procedures  Outcome: Progressing Towards Goal  Goal: Psychosocial  Outcome: Progressing Towards Goal  Goal: *Oxygen saturation within defined limits  Outcome: Progressing Towards Goal  Goal: *Hemodynamically stable  Outcome: Progressing Towards Goal  Goal: *Optimal pain control at patient's stated goal  Outcome: Progressing Towards Goal  Goal: *Anxiety reduced or absent  Outcome: Progressing Towards Goal  Goal: *Demonstrates progressive activity  Outcome: Progressing Towards Goal     Problem: Heart Failure: Day 4  Goal: Off Pathway (Use only if patient is Off Pathway)  Outcome: Progressing Towards Goal  Goal: Activity/Safety  Outcome: Progressing Towards Goal  Goal: Diagnostic Test/Procedures  Outcome: Progressing Towards Goal  Goal: Nutrition/Diet  Outcome: Progressing Towards Goal  Goal: Discharge Planning  Outcome: Progressing Towards Goal  Goal: Medications  Outcome: Progressing Towards Goal  Goal: Respiratory  Outcome: Progressing Towards Goal  Goal: Treatments/Interventions/Procedures  Outcome: Progressing Towards Goal  Goal: Psychosocial  Outcome: Progressing Towards Goal  Goal: *Oxygen saturation within defined limits  Outcome: Progressing Towards Goal  Goal: *Hemodynamically stable  Outcome: Progressing Towards Goal  Goal: *Optimal pain control at patient's stated goal  Outcome: Progressing Towards Goal  Goal: *Anxiety reduced or absent  Outcome: Progressing Towards Goal  Goal: *Demonstrates progressive activity  Outcome: Progressing Towards Goal     Problem: Heart Failure: Day 5  Goal: Off Pathway (Use only if patient is Off Pathway)  Outcome: Progressing Towards Goal  Goal: Activity/Safety  Outcome: Progressing Towards Goal  Goal: Diagnostic Test/Procedures  Outcome: Progressing Towards Goal  Goal: Nutrition/Diet  Outcome: Progressing Towards Goal  Goal: Discharge Planning  Outcome: Progressing Towards Goal  Goal: Medications  Outcome: Progressing Towards Goal  Goal: Respiratory  Outcome: Progressing Towards Goal  Goal: Treatments/Interventions/Procedures  Outcome: Progressing Towards Goal  Goal: Psychosocial  Outcome: Progressing Towards Goal

## 2023-01-07 NOTE — PROGRESS NOTES
Hospitalist Progress Note    Patient: Tierra Gray MRN: [id number]  CSN: [id number]    YOB: 1927  Age: 80 y.o. Sex: female    DOA: 1/2/2023 LOS:  LOS: 5 days                Assessment and Plan:    Principal Problem:    Acute exacerbation of CHF (congestive heart failure) ([facility] [address] (1/2/2023)    Active Problems:    HTN (hypertension) (1/2/2023)      Hypothyroidism (1/2/2023)      New onset atrial fibrillation ([address] (1/2/2023)      Aortic stenosis (1/2/2023)      Hypomagnesemia (1/2/2023)        Cardiac : on metoprolol per cardiology    Afib:  No AC due to bleeding risk per patient request. Rate control with metoprolol    CHF: on Lasix. This is more diastolic dysfunction    FEN: replacing  with Kdur. Will check    SNF is likely     Chief complaint:  79 y/o female with HTN, hypothyroidism, and aortic stenosis is admitted for an acute CHF exacerbation with new onset atrial fibrillation. Subjective:  She had a coughing spell which was severe. Better now        Review of systems:    General: No fevers or chills. Cardiovascular: No chest pain or pressure. No palpitations. Pulmonary: No shortness of breath. Gastrointestinal: No nausea, vomiting. Objective:    Vital signs/Intake and Output:  Visit Vitals  /66 (BP 1 Location: Left arm, BP Patient Position: Lying)   Pulse 78   Temp 97.3 °F (36.3 °C)   Resp 16   Ht 5' 1\" (1.549 m)   Wt 71.4 kg (157 lb 6.5 oz)   SpO2 94%   BMI 29.74 kg/m²     Current Shift:  01/07 0701 - 01/07 1900  In: -   Out: 275 [Urine:275]  Last three shifts:  01/05 1901 - 01/07 0700  In: 100 [I.V.:100]  Out: 2200 [Urine:2200]    Physical Exam:  General: NAD, AAOx3. Non-toxic. HEENT: NC/AT. PERRLA, EOMI.  MMM. Lungs: Nml inspection. CTA B/L. Few rales at bases  Heart:  S1S2 RRR,  PMI mid 5th IC space. murmur appreciated  Abdomen: Soft, NT/ND.  BS+. No peritoneal signs. Extremities: No C/C/E. Psych:   Nml affect. Neurologic:  2-12 intact.   Strength 5/5 throughout. Sensation symmetrical.          Labs: Results:       Chemistry Recent Labs     01/07/23  0132 01/05/23  0600   * 116*   * 137   K 3.5 3.4*   CL 97* 100   CO2 30 28   BUN 20* 18   CREA 0.91 0.94   CA 8.4* 9.0   AGAP 8 9   BUCR 22* 19   AP  --  103   TP  --  6.5   ALB  --  3.4   GLOB  --  3.1   AGRAT  --  1.1      CBC w/Diff Recent Labs     01/05/23  0600   WBC 8.7   RBC 3.80*   HGB 12.3   HCT 36.8         Cardiac Enzymes No results for input(s): CPK, CKND1, ENZO in the last 72 hours. No lab exists for component: CKRMB, TROIP   Coagulation No results for input(s): PTP, INR, APTT, INREXT in the last 72 hours. Lipid Panel No results found for: CHOL, CHOLPOCT, CHOLX, CHLST, CHOLV, 350222, HDL, HDLP, LDL, LDLC, DLDLP, 907224, VLDLC, VLDL, TGLX, TRIGL, TRIGP, TGLPOCT, CHHD, CHHDX   BNP No results for input(s): BNPP in the last 72 hours.    Liver Enzymes Recent Labs     01/05/23  0600   TP 6.5   ALB 3.4         Thyroid Studies Lab Results   Component Value Date/Time    TSH 6.23 (H) 01/02/2023 01:45 PM        Procedures/imaging: see electronic medical records for all procedures/Xrays and details which were not copied into this note but were reviewed prior to creation of Plan

## 2023-01-08 LAB
ALBUMIN SERPL-MCNC: 3.2 G/DL (ref 3.4–5)
ALBUMIN/GLOB SERPL: 1.1 (ref 0.8–1.7)
ALP SERPL-CCNC: 97 U/L (ref 45–117)
ALT SERPL-CCNC: 18 U/L (ref 13–56)
ANION GAP SERPL CALC-SCNC: 9 MMOL/L (ref 3–18)
AST SERPL-CCNC: 20 U/L (ref 10–38)
BASOPHILS # BLD: 0 K/UL (ref 0–0.1)
BASOPHILS NFR BLD: 1 % (ref 0–2)
BILIRUB SERPL-MCNC: 0.6 MG/DL (ref 0.2–1)
BUN SERPL-MCNC: 20 MG/DL (ref 7–18)
BUN/CREAT SERPL: 23 (ref 12–20)
CALCIUM SERPL-MCNC: 8.6 MG/DL (ref 8.5–10.1)
CHLORIDE SERPL-SCNC: 95 MMOL/L (ref 100–111)
CO2 SERPL-SCNC: 29 MMOL/L (ref 21–32)
CREAT SERPL-MCNC: 0.86 MG/DL (ref 0.6–1.3)
DIFFERENTIAL METHOD BLD: ABNORMAL
EOSINOPHIL # BLD: 0.5 K/UL (ref 0–0.4)
EOSINOPHIL NFR BLD: 7 % (ref 0–5)
ERYTHROCYTE [DISTWIDTH] IN BLOOD BY AUTOMATED COUNT: 13 % (ref 11.6–14.5)
GLOBULIN SER CALC-MCNC: 3 G/DL (ref 2–4)
GLUCOSE SERPL-MCNC: 92 MG/DL (ref 74–99)
HCT VFR BLD AUTO: 37.4 % (ref 35–45)
HGB BLD-MCNC: 12.2 G/DL (ref 12–16)
IMM GRANULOCYTES # BLD AUTO: 0 K/UL (ref 0–0.04)
IMM GRANULOCYTES NFR BLD AUTO: 1 % (ref 0–0.5)
LYMPHOCYTES # BLD: 1.1 K/UL (ref 0.9–3.6)
LYMPHOCYTES NFR BLD: 15 % (ref 21–52)
MAGNESIUM SERPL-MCNC: 1.6 MG/DL (ref 1.6–2.6)
MCH RBC QN AUTO: 32.4 PG (ref 24–34)
MCHC RBC AUTO-ENTMCNC: 32.6 G/DL (ref 31–37)
MCV RBC AUTO: 99.2 FL (ref 78–100)
MONOCYTES # BLD: 0.9 K/UL (ref 0.05–1.2)
MONOCYTES NFR BLD: 13 % (ref 3–10)
NEUTS SEG # BLD: 4.6 K/UL (ref 1.8–8)
NEUTS SEG NFR BLD: 64 % (ref 40–73)
NRBC # BLD: 0 K/UL (ref 0–0.01)
NRBC BLD-RTO: 0 PER 100 WBC
PLATELET # BLD AUTO: 265 K/UL (ref 135–420)
PMV BLD AUTO: 10.5 FL (ref 9.2–11.8)
POTASSIUM SERPL-SCNC: 3.5 MMOL/L (ref 3.5–5.5)
PROT SERPL-MCNC: 6.2 G/DL (ref 6.4–8.2)
RBC # BLD AUTO: 3.77 M/UL (ref 4.2–5.3)
SODIUM SERPL-SCNC: 133 MMOL/L (ref 136–145)
WBC # BLD AUTO: 7.2 K/UL (ref 4.6–13.2)

## 2023-01-08 PROCEDURE — 83735 ASSAY OF MAGNESIUM: CPT

## 2023-01-08 PROCEDURE — 74011250636 HC RX REV CODE- 250/636: Performed by: FAMILY MEDICINE

## 2023-01-08 PROCEDURE — 74011250637 HC RX REV CODE- 250/637: Performed by: HOSPITALIST

## 2023-01-08 PROCEDURE — 74011000250 HC RX REV CODE- 250: Performed by: FAMILY MEDICINE

## 2023-01-08 PROCEDURE — 65270000046 HC RM TELEMETRY

## 2023-01-08 PROCEDURE — 74011250637 HC RX REV CODE- 250/637: Performed by: FAMILY MEDICINE

## 2023-01-08 PROCEDURE — 80053 COMPREHEN METABOLIC PANEL: CPT

## 2023-01-08 PROCEDURE — 36415 COLL VENOUS BLD VENIPUNCTURE: CPT

## 2023-01-08 PROCEDURE — 74011250637 HC RX REV CODE- 250/637: Performed by: INTERNAL MEDICINE

## 2023-01-08 PROCEDURE — 85025 COMPLETE CBC W/AUTO DIFF WBC: CPT

## 2023-01-08 RX ORDER — GUAIFENESIN 600 MG/1
1200 TABLET, EXTENDED RELEASE ORAL 2 TIMES DAILY
Status: DISCONTINUED | OUTPATIENT
Start: 2023-01-08 | End: 2023-01-12 | Stop reason: HOSPADM

## 2023-01-08 RX ADMIN — SODIUM CHLORIDE, PRESERVATIVE FREE 10 ML: 5 INJECTION INTRAVENOUS at 21:34

## 2023-01-08 RX ADMIN — FUROSEMIDE 40 MG: 10 INJECTION, SOLUTION INTRAMUSCULAR; INTRAVENOUS at 21:34

## 2023-01-08 RX ADMIN — POTASSIUM CHLORIDE 20 MEQ: 1500 TABLET, EXTENDED RELEASE ORAL at 11:27

## 2023-01-08 RX ADMIN — METOPROLOL TARTRATE 25 MG: 25 TABLET, FILM COATED ORAL at 11:27

## 2023-01-08 RX ADMIN — CYANOCOBALAMIN TAB 500 MCG 250 MCG: 500 TAB at 11:27

## 2023-01-08 RX ADMIN — ACETAMINOPHEN 650 MG: 325 TABLET ORAL at 21:34

## 2023-01-08 RX ADMIN — BENZONATATE 100 MG: 100 CAPSULE ORAL at 11:27

## 2023-01-08 RX ADMIN — Medication 1 TABLET: at 11:27

## 2023-01-08 RX ADMIN — METOPROLOL TARTRATE 25 MG: 25 TABLET, FILM COATED ORAL at 21:34

## 2023-01-08 RX ADMIN — SODIUM CHLORIDE, PRESERVATIVE FREE 10 ML: 5 INJECTION INTRAVENOUS at 11:30

## 2023-01-08 RX ADMIN — FUROSEMIDE 40 MG: 10 INJECTION, SOLUTION INTRAMUSCULAR; INTRAVENOUS at 11:27

## 2023-01-08 RX ADMIN — BENZONATATE 100 MG: 100 CAPSULE ORAL at 06:56

## 2023-01-08 RX ADMIN — BENZONATATE 100 MG: 100 CAPSULE ORAL at 21:34

## 2023-01-08 RX ADMIN — LEVOTHYROXINE SODIUM 100 MCG: 0.1 TABLET ORAL at 06:53

## 2023-01-08 RX ADMIN — SODIUM CHLORIDE, PRESERVATIVE FREE 10 ML: 5 INJECTION INTRAVENOUS at 18:51

## 2023-01-08 RX ADMIN — ENOXAPARIN SODIUM 70 MG: 100 INJECTION SUBCUTANEOUS at 11:27

## 2023-01-08 RX ADMIN — PANTOPRAZOLE SODIUM 40 MG: 40 TABLET, DELAYED RELEASE ORAL at 06:53

## 2023-01-08 RX ADMIN — GUAIFENESIN 1200 MG: 600 TABLET, EXTENDED RELEASE ORAL at 21:34

## 2023-01-08 NOTE — PROGRESS NOTES
Problem: Falls - Risk of  Goal: *Absence of Falls  Description: Document Kristal Roman Fall Risk and appropriate interventions in the flowsheet.   Outcome: Progressing Towards Goal  Note: Fall Risk Interventions:  Mobility Interventions: Communicate number of staff needed for ambulation/transfer, Bed/chair exit alarm         Medication Interventions: Bed/chair exit alarm, Patient to call before getting OOB, Teach patient to arise slowly, Evaluate medications/consider consulting pharmacy         History of Falls Interventions: Door open when patient unattended, Evaluate medications/consider consulting pharmacy         Problem: Patient Education: Go to Patient Education Activity  Goal: Patient/Family Education  Outcome: Progressing Towards Goal     Problem: Pain  Goal: *Control of Pain  Outcome: Progressing Towards Goal     Problem: Patient Education: Go to Patient Education Activity  Goal: Patient/Family Education  Outcome: Progressing Towards Goal

## 2023-01-08 NOTE — PROGRESS NOTES
Hospitalist Progress Note    Patient: Jacob Gregg MRN: [id number]  CSN: [id number]    YOB: 1927  Age: 80 y.o. Sex: female    DOA: 1/2/2023 LOS:  LOS: 6 days                Assessment and Plan:    Principal Problem:    Acute exacerbation of CHF (congestive heart failure) ([facility] [address] (1/2/2023)    Active Problems:    HTN (hypertension) (1/2/2023)      Hypothyroidism (1/2/2023)      New onset atrial fibrillation ([facility] [address] (1/2/2023)      Aortic stenosis (1/2/2023)      Hypomagnesemia (1/2/2023)        Cardiac : on metoprolol per cardiology     Afib:  No AC due to bleeding risk per patient request. Rate control with metoprolol     CHF: on Lasix. This is more diastolic dysfunction     FEN: replacing  with Kdur. Will check again tomorrow. Also Na is low  Will trial of mucinex bid for cough as well as recheck xray     SNF is likely      Soent 30 minutes talking with family and patient    Chief complaint:  81 y/o female with HTN, hypothyroidism, and aortic stenosis is admitted for an acute CHF exacerbation with new onset atrial fibrillation. Subjective:    Feels pretty good overall       Review of systems:    General: No fevers or chills. Cardiovascular: No chest pain or pressure. No palpitations. Pulmonary: No shortness of breath. Gastrointestinal: No nausea, vomiting. Objective:    Vital signs/Intake and Output:  Visit Vitals  BP (!) 127/55 (BP 1 Location: Left arm, BP Patient Position: Supine)   Pulse 81   Temp 98 °F (36.7 °C)   Resp 18   Ht 5' 1\" (1.549 m)   Wt 69 kg (152 lb 1.9 oz)   SpO2 94%   BMI 28.74 kg/m²     Current Shift:  No intake/output data recorded. Last three shifts:  01/06 1901 - 01/08 0700  In: 240 [P.O.:240]  Out: 2550 [Urine:2550]    Physical Exam:  General: NAD, AAOx3. Non-toxic. HEENT: NC/AT. PERRLA, EOMI.  MMM. Lungs: Nml inspection. CTA B/L. No wheezing, rales or rhonchi. Heart:  S1S2 RRR,  PMI mid 5th IC space. No M/RG. Abdomen: Soft, NT/ND.  BS+.  No peritoneal signs.  Extremities: No C/C/E. Psych:   Nml affect. Neurologic:  2-12 intact. Strength 5/5 throughout. Sensation symmetrical.          Labs: Results:       Chemistry Recent Labs     01/08/23 0137 01/07/23 0132   GLU 92 100*   * 135*   K 3.5 3.5   CL 95* 97*   CO2 29 30   BUN 20* 20*   CREA 0.86 0.91   CA 8.6 8.4*   AGAP 9 8   BUCR 23* 22*   AP 97  --    TP 6.2*  --    ALB 3.2*  --    GLOB 3.0  --    AGRAT 1.1  --       CBC w/Diff Recent Labs     01/08/23 0137   WBC 7.2   RBC 3.77*   HGB 12.2   HCT 37.4      GRANS 64   LYMPH 15*   EOS 7*      Cardiac Enzymes No results for input(s): CPK, CKND1, ENZO in the last 72 hours. No lab exists for component: CKRMB, TROIP   Coagulation No results for input(s): PTP, INR, APTT, INREXT in the last 72 hours. Lipid Panel No results found for: CHOL, CHOLPOCT, CHOLX, CHLST, CHOLV, 936432, HDL, HDLP, LDL, LDLC, DLDLP, 278725, VLDLC, VLDL, TGLX, TRIGL, TRIGP, TGLPOCT, CHHD, CHHDX   BNP No results for input(s): BNPP in the last 72 hours.    Liver Enzymes Recent Labs     01/08/23 0137   TP 6.2*   ALB 3.2*   AP 97      Thyroid Studies Lab Results   Component Value Date/Time    TSH 6.23 (H) 01/02/2023 01:45 PM        Procedures/imaging: see electronic medical records for all procedures/Xrays and details which were not copied into this note but were reviewed prior to creation of Plan

## 2023-01-09 ENCOUNTER — APPOINTMENT (OUTPATIENT)
Dept: GENERAL RADIOLOGY | Age: 88
DRG: 291 | End: 2023-01-09
Attending: INTERNAL MEDICINE
Payer: MEDICARE

## 2023-01-09 LAB
ALBUMIN SERPL-MCNC: 3 G/DL (ref 3.4–5)
ALBUMIN/GLOB SERPL: 0.9 (ref 0.8–1.7)
ALP SERPL-CCNC: 93 U/L (ref 45–117)
ALT SERPL-CCNC: 17 U/L (ref 13–56)
ANION GAP SERPL CALC-SCNC: 8 MMOL/L (ref 3–18)
AST SERPL-CCNC: 18 U/L (ref 10–38)
BASOPHILS # BLD: 0 K/UL (ref 0–0.1)
BASOPHILS NFR BLD: 0 % (ref 0–2)
BILIRUB SERPL-MCNC: 0.5 MG/DL (ref 0.2–1)
BUN SERPL-MCNC: 22 MG/DL (ref 7–18)
BUN/CREAT SERPL: 23 (ref 12–20)
CALCIUM SERPL-MCNC: 8.8 MG/DL (ref 8.5–10.1)
CHLORIDE SERPL-SCNC: 94 MMOL/L (ref 100–111)
CO2 SERPL-SCNC: 29 MMOL/L (ref 21–32)
CREAT SERPL-MCNC: 0.95 MG/DL (ref 0.6–1.3)
DIFFERENTIAL METHOD BLD: ABNORMAL
EOSINOPHIL # BLD: 0.4 K/UL (ref 0–0.4)
EOSINOPHIL NFR BLD: 5 % (ref 0–5)
ERYTHROCYTE [DISTWIDTH] IN BLOOD BY AUTOMATED COUNT: 12.9 % (ref 11.6–14.5)
GLOBULIN SER CALC-MCNC: 3.5 G/DL (ref 2–4)
GLUCOSE SERPL-MCNC: 105 MG/DL (ref 74–99)
HCT VFR BLD AUTO: 36.7 % (ref 35–45)
HGB BLD-MCNC: 12.2 G/DL (ref 12–16)
IMM GRANULOCYTES # BLD AUTO: 0 K/UL (ref 0–0.04)
IMM GRANULOCYTES NFR BLD AUTO: 1 % (ref 0–0.5)
LYMPHOCYTES # BLD: 1.2 K/UL (ref 0.9–3.6)
LYMPHOCYTES NFR BLD: 17 % (ref 21–52)
MCH RBC QN AUTO: 31.6 PG (ref 24–34)
MCHC RBC AUTO-ENTMCNC: 33.2 G/DL (ref 31–37)
MCV RBC AUTO: 95.1 FL (ref 78–100)
MONOCYTES # BLD: 1 K/UL (ref 0.05–1.2)
MONOCYTES NFR BLD: 14 % (ref 3–10)
NEUTS SEG # BLD: 4.6 K/UL (ref 1.8–8)
NEUTS SEG NFR BLD: 64 % (ref 40–73)
NRBC # BLD: 0 K/UL (ref 0–0.01)
NRBC BLD-RTO: 0 PER 100 WBC
PLATELET # BLD AUTO: 304 K/UL (ref 135–420)
PMV BLD AUTO: 9.9 FL (ref 9.2–11.8)
POTASSIUM SERPL-SCNC: 3.2 MMOL/L (ref 3.5–5.5)
PROT SERPL-MCNC: 6.5 G/DL (ref 6.4–8.2)
RBC # BLD AUTO: 3.86 M/UL (ref 4.2–5.3)
SODIUM SERPL-SCNC: 131 MMOL/L (ref 136–145)
WBC # BLD AUTO: 7.2 K/UL (ref 4.6–13.2)

## 2023-01-09 PROCEDURE — 74011250637 HC RX REV CODE- 250/637: Performed by: INTERNAL MEDICINE

## 2023-01-09 PROCEDURE — 74011250637 HC RX REV CODE- 250/637: Performed by: HOSPITALIST

## 2023-01-09 PROCEDURE — 85025 COMPLETE CBC W/AUTO DIFF WBC: CPT

## 2023-01-09 PROCEDURE — 36415 COLL VENOUS BLD VENIPUNCTURE: CPT

## 2023-01-09 PROCEDURE — 71046 X-RAY EXAM CHEST 2 VIEWS: CPT

## 2023-01-09 PROCEDURE — 74011250636 HC RX REV CODE- 250/636: Performed by: INTERNAL MEDICINE

## 2023-01-09 PROCEDURE — 74011250637 HC RX REV CODE- 250/637: Performed by: FAMILY MEDICINE

## 2023-01-09 PROCEDURE — 80053 COMPREHEN METABOLIC PANEL: CPT

## 2023-01-09 PROCEDURE — 97530 THERAPEUTIC ACTIVITIES: CPT

## 2023-01-09 PROCEDURE — 74011250636 HC RX REV CODE- 250/636: Performed by: FAMILY MEDICINE

## 2023-01-09 PROCEDURE — 97116 GAIT TRAINING THERAPY: CPT

## 2023-01-09 PROCEDURE — 65270000046 HC RM TELEMETRY

## 2023-01-09 PROCEDURE — 74011000250 HC RX REV CODE- 250: Performed by: FAMILY MEDICINE

## 2023-01-09 RX ORDER — ENOXAPARIN SODIUM 100 MG/ML
1 INJECTION SUBCUTANEOUS EVERY 12 HOURS
Status: DISCONTINUED | OUTPATIENT
Start: 2023-01-09 | End: 2023-01-09

## 2023-01-09 RX ORDER — ENOXAPARIN SODIUM 100 MG/ML
1 INJECTION SUBCUTANEOUS EVERY 24 HOURS
Status: DISCONTINUED | OUTPATIENT
Start: 2023-01-10 | End: 2023-01-12

## 2023-01-09 RX ORDER — FUROSEMIDE 40 MG/1
40 TABLET ORAL DAILY
Status: DISCONTINUED | OUTPATIENT
Start: 2023-01-10 | End: 2023-01-12 | Stop reason: HOSPADM

## 2023-01-09 RX ORDER — POTASSIUM CHLORIDE 20 MEQ/1
40 TABLET, EXTENDED RELEASE ORAL 2 TIMES DAILY
Status: DISCONTINUED | OUTPATIENT
Start: 2023-01-09 | End: 2023-01-10

## 2023-01-09 RX ORDER — FUROSEMIDE 10 MG/ML
40 INJECTION INTRAMUSCULAR; INTRAVENOUS DAILY
Status: DISCONTINUED | OUTPATIENT
Start: 2023-01-10 | End: 2023-01-09

## 2023-01-09 RX ORDER — MAGNESIUM SULFATE HEPTAHYDRATE 40 MG/ML
2 INJECTION, SOLUTION INTRAVENOUS ONCE
Status: COMPLETED | OUTPATIENT
Start: 2023-01-10 | End: 2023-01-10

## 2023-01-09 RX ORDER — MAGNESIUM SULFATE HEPTAHYDRATE 40 MG/ML
2 INJECTION, SOLUTION INTRAVENOUS ONCE
Status: DISCONTINUED | OUTPATIENT
Start: 2023-01-10 | End: 2023-01-09

## 2023-01-09 RX ADMIN — METOPROLOL TARTRATE 25 MG: 25 TABLET, FILM COATED ORAL at 22:55

## 2023-01-09 RX ADMIN — GUAIFENESIN 1200 MG: 600 TABLET, EXTENDED RELEASE ORAL at 08:46

## 2023-01-09 RX ADMIN — SODIUM CHLORIDE, PRESERVATIVE FREE 10 ML: 5 INJECTION INTRAVENOUS at 07:26

## 2023-01-09 RX ADMIN — METOPROLOL TARTRATE 25 MG: 25 TABLET, FILM COATED ORAL at 08:46

## 2023-01-09 RX ADMIN — MAGNESIUM SULFATE HEPTAHYDRATE 2 G: 40 INJECTION, SOLUTION INTRAVENOUS at 23:56

## 2023-01-09 RX ADMIN — SODIUM CHLORIDE, PRESERVATIVE FREE 10 ML: 5 INJECTION INTRAVENOUS at 08:47

## 2023-01-09 RX ADMIN — ACETAMINOPHEN 650 MG: 325 TABLET ORAL at 08:47

## 2023-01-09 RX ADMIN — FUROSEMIDE 40 MG: 10 INJECTION, SOLUTION INTRAMUSCULAR; INTRAVENOUS at 08:47

## 2023-01-09 RX ADMIN — ENOXAPARIN SODIUM 70 MG: 100 INJECTION SUBCUTANEOUS at 09:02

## 2023-01-09 RX ADMIN — POTASSIUM CHLORIDE 20 MEQ: 1500 TABLET, EXTENDED RELEASE ORAL at 08:47

## 2023-01-09 RX ADMIN — ACETAMINOPHEN 650 MG: 325 TABLET ORAL at 22:54

## 2023-01-09 RX ADMIN — PANTOPRAZOLE SODIUM 40 MG: 40 TABLET, DELAYED RELEASE ORAL at 07:24

## 2023-01-09 RX ADMIN — POTASSIUM CHLORIDE 40 MEQ: 1500 TABLET, EXTENDED RELEASE ORAL at 22:56

## 2023-01-09 RX ADMIN — BENZONATATE 100 MG: 100 CAPSULE ORAL at 08:49

## 2023-01-09 RX ADMIN — CYANOCOBALAMIN TAB 500 MCG: 500 TAB at 08:48

## 2023-01-09 RX ADMIN — Medication 1 TABLET: at 08:46

## 2023-01-09 RX ADMIN — SODIUM CHLORIDE, PRESERVATIVE FREE 10 ML: 5 INJECTION INTRAVENOUS at 15:14

## 2023-01-09 RX ADMIN — LEVOTHYROXINE SODIUM 100 MCG: 0.1 TABLET ORAL at 07:24

## 2023-01-09 RX ADMIN — GUAIFENESIN 1200 MG: 600 TABLET, EXTENDED RELEASE ORAL at 22:55

## 2023-01-09 NOTE — PROGRESS NOTES
Problem: Falls - Risk of  Goal: *Absence of Falls  Description: Document Jose Luis Aguirre Fall Risk and appropriate interventions in the flowsheet.   1/8/2023 2041 by Jesse Sexton RN  Outcome: Progressing Towards Goal  Note: Fall Risk Interventions:  Mobility Interventions: Bed/chair exit alarm         Medication Interventions: Bed/chair exit alarm    Elimination Interventions: Call light in reach    History of Falls Interventions: Door open when patient unattended, Evaluate medications/consider consulting pharmacy      1/8/2023 2012 by Jesse Sexton RN  Outcome: Progressing Towards Goal  Note: Fall Risk Interventions:  Mobility Interventions: Bed/chair exit alarm         Medication Interventions: Bed/chair exit alarm    Elimination Interventions: Call light in reach    History of Falls Interventions: Door open when patient unattended, Evaluate medications/consider consulting pharmacy         Problem: Patient Education: Go to Patient Education Activity  Goal: Patient/Family Education  1/8/2023 2041 by Jesse Sexton RN  Outcome: Progressing Towards Goal  1/8/2023 2012 by Jesse Sexton RN  Outcome: Progressing Towards Goal     Problem: Pain  Goal: *Control of Pain  1/8/2023 2041 by Jesse Sexton RN  Outcome: Progressing Towards Goal  1/8/2023 2012 by Jesse Sexton RN  Outcome: Progressing Towards Goal     Problem: Patient Education: Go to Patient Education Activity  Goal: Patient/Family Education  1/8/2023 2041 by Jesse Sexton RN  Outcome: Progressing Towards Goal  1/8/2023 2012 by Jesse Sexton RN  Outcome: Progressing Towards Goal     Problem: Patient Education: Go to Patient Education Activity  Goal: Patient/Family Education  1/8/2023 2041 by Jesse Sexton RN  Outcome: Progressing Towards Goal  1/8/2023 2012 by Jesse Sexton RN  Outcome: Progressing Towards Goal     Problem: Heart Failure: Day 1  Goal: Off Pathway (Use only if patient is Off Pathway)  1/8/2023 2041 by Elba Landis RN  Outcome: Progressing Towards Goal  1/8/2023 2012 by Elba Landis RN  Outcome: Progressing Towards Goal  Goal: Activity/Safety  1/8/2023 2041 by Elba Landis RN  Outcome: Progressing Towards Goal  1/8/2023 2012 by Elba Landis RN  Outcome: Progressing Towards Goal  Goal: Consults, if ordered  1/8/2023 2041 by Elba Landis RN  Outcome: Progressing Towards Goal  1/8/2023 2012 by Elba Landis RN  Outcome: Progressing Towards Goal  Goal: Diagnostic Test/Procedures  1/8/2023 2041 by Elba Landis RN  Outcome: Progressing Towards Goal  1/8/2023 2012 by Elba Landis RN  Outcome: Progressing Towards Goal  Goal: Nutrition/Diet  1/8/2023 2041 by Elba Landis RN  Outcome: Progressing Towards Goal  1/8/2023 2012 by Elba Landis RN  Outcome: Progressing Towards Goal  Goal: Discharge Planning  1/8/2023 2041 by Elba Landis RN  Outcome: Progressing Towards Goal  1/8/2023 2012 by Elba Landis RN  Outcome: Progressing Towards Goal  Goal: Medications  1/8/2023 2041 by Elba Landis RN  Outcome: Progressing Towards Goal  1/8/2023 2012 by Elba Landis RN  Outcome: Progressing Towards Goal  Goal: Respiratory  1/8/2023 2041 by Elba Landis RN  Outcome: Progressing Towards Goal  1/8/2023 2012 by Elba Landis RN  Outcome: Progressing Towards Goal  Goal: Treatments/Interventions/Procedures  1/8/2023 2041 by Elba Landis RN  Outcome: Progressing Towards Goal  1/8/2023 2012 by Elba Landis RN  Outcome: Progressing Towards Goal  Goal: Psychosocial  1/8/2023 2041 by Elba Landis RN  Outcome: Progressing Towards Goal  1/8/2023 2012 by Elba Landis RN  Outcome: Progressing Towards Goal  Goal: *Oxygen saturation within defined limits  1/8/2023 2041 by Elba Landis RN  Outcome: Progressing Towards Goal  1/8/2023 2012 by Elba Landis RN  Outcome: Progressing Towards Goal  Goal: *Hemodynamically stable  1/8/2023 2041 by Cassie Munoz RN  Outcome: Progressing Towards Goal  1/8/2023 2012 by Cassie Munoz RN  Outcome: Progressing Towards Goal  Goal: *Optimal pain control at patient's stated goal  1/8/2023 2041 by Cassie Munoz RN  Outcome: Progressing Towards Goal  1/8/2023 2012 by Cassie Munoz RN  Outcome: Progressing Towards Goal  Goal: *Anxiety reduced or absent  1/8/2023 2041 by Cassie Munoz RN  Outcome: Progressing Towards Goal  1/8/2023 2012 by Cassie Munoz RN  Outcome: Progressing Towards Goal     Problem: Heart Failure: Day 2  Goal: Off Pathway (Use only if patient is Off Pathway)  1/8/2023 2041 by Cassie Munoz RN  Outcome: Progressing Towards Goal  1/8/2023 2012 by Cassie Munoz RN  Outcome: Progressing Towards Goal  Goal: Activity/Safety  1/8/2023 2041 by Cassie Munoz RN  Outcome: Progressing Towards Goal  1/8/2023 2012 by Cassie Munoz RN  Outcome: Progressing Towards Goal  Goal: Consults, if ordered  1/8/2023 2041 by Cassie Munoz RN  Outcome: Progressing Towards Goal  1/8/2023 2012 by Cassie Munoz RN  Outcome: Progressing Towards Goal  Goal: Diagnostic Test/Procedures  1/8/2023 2041 by Cassie Munoz RN  Outcome: Progressing Towards Goal  1/8/2023 2012 by Cassie Munoz RN  Outcome: Progressing Towards Goal  Goal: Nutrition/Diet  1/8/2023 2041 by Cassie Munoz RN  Outcome: Progressing Towards Goal  1/8/2023 2012 by Cassie Munoz RN  Outcome: Progressing Towards Goal  Goal: Discharge Planning  1/8/2023 2041 by Cassie Munoz RN  Outcome: Progressing Towards Goal  1/8/2023 2012 by Cassie Munoz RN  Outcome: Progressing Towards Goal  Goal: Medications  1/8/2023 2041 by Cassie Munoz RN  Outcome: Progressing Towards Goal  1/8/2023 2012 by Cassie Munoz RN  Outcome: Progressing Towards Goal  Goal: Respiratory  1/8/2023 2041 by Cassie Munoz RN  Outcome: Progressing Towards Goal  1/8/2023 2012 by Phyllis Lord RN  Outcome: Progressing Towards Goal  Goal: Treatments/Interventions/Procedures  1/8/2023 2041 by Phyllis Lord RN  Outcome: Progressing Towards Goal  1/8/2023 2012 by Phyllis Lord RN  Outcome: Progressing Towards Goal  Goal: Psychosocial  1/8/2023 2041 by Phyllis Lord RN  Outcome: Progressing Towards Goal  1/8/2023 2012 by Phyllis Lord RN  Outcome: Progressing Towards Goal  Goal: *Oxygen saturation within defined limits  1/8/2023 2041 by Phyllis Lord RN  Outcome: Progressing Towards Goal  1/8/2023 2012 by Phyllis Lord RN  Outcome: Progressing Towards Goal  Goal: *Hemodynamically stable  1/8/2023 2041 by Phyllis Lord RN  Outcome: Progressing Towards Goal  1/8/2023 2012 by Phyllis Lord RN  Outcome: Progressing Towards Goal  Goal: *Optimal pain control at patient's stated goal  1/8/2023 2041 by Phyllis Lord RN  Outcome: Progressing Towards Goal  1/8/2023 2012 by Phyllis Lord RN  Outcome: Progressing Towards Goal  Goal: *Anxiety reduced or absent  1/8/2023 2041 by Phyllis Lord RN  Outcome: Progressing Towards Goal  1/8/2023 2012 by Phyllis Lord RN  Outcome: Progressing Towards Goal  Goal: *Demonstrates progressive activity  1/8/2023 2041 by Phyllis Lord RN  Outcome: Progressing Towards Goal  1/8/2023 2012 by Phyllis Lord RN  Outcome: Progressing Towards Goal     Problem: Heart Failure: Day 3  Goal: Off Pathway (Use only if patient is Off Pathway)  1/8/2023 2041 by Phyllis Lord RN  Outcome: Progressing Towards Goal  1/8/2023 2012 by Phyllis Lord RN  Outcome: Progressing Towards Goal  Goal: Activity/Safety  1/8/2023 2041 by Phyllis Lord RN  Outcome: Progressing Towards Goal  1/8/2023 2012 by Phyllis Lord RN  Outcome: Progressing Towards Goal  Goal: Diagnostic Test/Procedures  1/8/2023 2041 by Phyllis Lord RN  Outcome: Progressing Towards Goal  1/8/2023 2012 by Phyllis Lord RN  Outcome: Progressing Towards Goal  Goal: Nutrition/Diet  1/8/2023 2041 by Cassie Munoz RN  Outcome: Progressing Towards Goal  1/8/2023 2012 by Cassie Munoz RN  Outcome: Progressing Towards Goal  Goal: Discharge Planning  1/8/2023 2041 by Cassie Munoz RN  Outcome: Progressing Towards Goal  1/8/2023 2012 by Cassie Munoz RN  Outcome: Progressing Towards Goal  Goal: Medications  1/8/2023 2041 by Cassie Munoz RN  Outcome: Progressing Towards Goal  1/8/2023 2012 by Cassie Munoz RN  Outcome: Progressing Towards Goal  Goal: Respiratory  1/8/2023 2041 by Cassie Munoz RN  Outcome: Progressing Towards Goal  1/8/2023 2012 by Cassie Munoz RN  Outcome: Progressing Towards Goal  Goal: Treatments/Interventions/Procedures  1/8/2023 2041 by Cassie Munoz RN  Outcome: Progressing Towards Goal  1/8/2023 2012 by Cassie Munoz RN  Outcome: Progressing Towards Goal  Goal: Psychosocial  1/8/2023 2041 by Cassie Munoz RN  Outcome: Progressing Towards Goal  1/8/2023 2012 by Cassie Munoz RN  Outcome: Progressing Towards Goal  Goal: *Oxygen saturation within defined limits  1/8/2023 2041 by Cassie Munoz RN  Outcome: Progressing Towards Goal  1/8/2023 2012 by Cassie Munoz RN  Outcome: Progressing Towards Goal  Goal: *Hemodynamically stable  1/8/2023 2041 by Cassie Munoz RN  Outcome: Progressing Towards Goal  1/8/2023 2012 by Cassie Munoz RN  Outcome: Progressing Towards Goal  Goal: *Optimal pain control at patient's stated goal  1/8/2023 2041 by Cassie Munoz RN  Outcome: Progressing Towards Goal  1/8/2023 2012 by Cassie Munoz RN  Outcome: Progressing Towards Goal  Goal: *Anxiety reduced or absent  1/8/2023 2041 by Cassie Munoz RN  Outcome: Progressing Towards Goal  1/8/2023 2012 by Cassie Munoz RN  Outcome: Progressing Towards Goal  Goal: *Demonstrates progressive activity  1/8/2023 2041 by Cassie Munoz RN  Outcome: Progressing Towards Goal  1/8/2023 2012 by Ira Murcia RN  Outcome: Progressing Towards Goal     Problem: Heart Failure: Day 4  Goal: Off Pathway (Use only if patient is Off Pathway)  1/8/2023 2041 by Ira Murcia RN  Outcome: Progressing Towards Goal  1/8/2023 2012 by Ira Murcia RN  Outcome: Progressing Towards Goal  Goal: Activity/Safety  1/8/2023 2041 by Ira Murcia RN  Outcome: Progressing Towards Goal  1/8/2023 2012 by Ira Murcia RN  Outcome: Progressing Towards Goal  Goal: Diagnostic Test/Procedures  1/8/2023 2041 by Ira Murcia RN  Outcome: Progressing Towards Goal  1/8/2023 2012 by Ira Murcia RN  Outcome: Progressing Towards Goal  Goal: Nutrition/Diet  1/8/2023 2041 by Ira Murcia RN  Outcome: Progressing Towards Goal  1/8/2023 2012 by Ira Murcia RN  Outcome: Progressing Towards Goal  Goal: Discharge Planning  1/8/2023 2041 by Ira Murcia RN  Outcome: Progressing Towards Goal  1/8/2023 2012 by Ira Murcia RN  Outcome: Progressing Towards Goal  Goal: Medications  1/8/2023 2041 by Ira Murcia RN  Outcome: Progressing Towards Goal  1/8/2023 2012 by Ira Murcia RN  Outcome: Progressing Towards Goal  Goal: Respiratory  1/8/2023 2041 by Ira Murcia RN  Outcome: Progressing Towards Goal  1/8/2023 2012 by Ira Murcia RN  Outcome: Progressing Towards Goal  Goal: Treatments/Interventions/Procedures  1/8/2023 2041 by Ira Murcia RN  Outcome: Progressing Towards Goal  1/8/2023 2012 by Ira Murcia RN  Outcome: Progressing Towards Goal  Goal: Psychosocial  1/8/2023 2041 by Ira Murcia RN  Outcome: Progressing Towards Goal  1/8/2023 2012 by Ira Murcia RN  Outcome: Progressing Towards Goal  Goal: *Oxygen saturation within defined limits  1/8/2023 2041 by Ira Murcia RN  Outcome: Progressing Towards Goal  1/8/2023 2012 by Ira Murcia RN  Outcome: Progressing Towards Goal  Goal: *Hemodynamically stable  1/8/2023 2041 by Poornima Quinn RN  Outcome: Progressing Towards Goal  1/8/2023 2012 by Poornima Quinn RN  Outcome: Progressing Towards Goal  Goal: *Optimal pain control at patient's stated goal  1/8/2023 2041 by Poornima Quinn RN  Outcome: Progressing Towards Goal  1/8/2023 2012 by Poornima Quinn RN  Outcome: Progressing Towards Goal  Goal: *Anxiety reduced or absent  1/8/2023 2041 by Poornima Quinn RN  Outcome: Progressing Towards Goal  1/8/2023 2012 by Poornima Quinn RN  Outcome: Progressing Towards Goal  Goal: *Demonstrates progressive activity  1/8/2023 2041 by Poornima Quinn RN  Outcome: Progressing Towards Goal  1/8/2023 2012 by Poornima Quinn RN  Outcome: Progressing Towards Goal     Problem: Heart Failure: Day 5  Goal: Off Pathway (Use only if patient is Off Pathway)  1/8/2023 2041 by Poornima Quinn RN  Outcome: Progressing Towards Goal  1/8/2023 2012 by Poornima Quinn RN  Outcome: Progressing Towards Goal  Goal: Activity/Safety  1/8/2023 2041 by Poornima Quinn RN  Outcome: Progressing Towards Goal  1/8/2023 2012 by Poornima Quinn RN  Outcome: Progressing Towards Goal  Goal: Diagnostic Test/Procedures  1/8/2023 2041 by Poornima Quinn RN  Outcome: Progressing Towards Goal  1/8/2023 2012 by Poornima Quinn RN  Outcome: Progressing Towards Goal  Goal: Nutrition/Diet  1/8/2023 2041 by Poornima Quinn RN  Outcome: Progressing Towards Goal  1/8/2023 2012 by Poornima Quinn RN  Outcome: Progressing Towards Goal  Goal: Discharge Planning  1/8/2023 2041 by Poornima Quinn RN  Outcome: Progressing Towards Goal  1/8/2023 2012 by Poornima Quinn RN  Outcome: Progressing Towards Goal  Goal: Medications  1/8/2023 2041 by Poornima Quinn RN  Outcome: Progressing Towards Goal  1/8/2023 2012 by Poornima Quinn RN  Outcome: Progressing Towards Goal  Goal: Respiratory  1/8/2023 2041 by Poornima Quinn RN  Outcome: Progressing Towards Goal  1/8/2023 2012 by Nohemi Duverney, RN  Outcome: Progressing Towards Goal  Goal: Treatments/Interventions/Procedures  1/8/2023 2041 by Nohemi Duverney, RN  Outcome: Progressing Towards Goal  1/8/2023 2012 by Nohemi Duverney, RN  Outcome: Progressing Towards Goal  Goal: Psychosocial  1/8/2023 2041 by Nohemi Duverney, RN  Outcome: Progressing Towards Goal  1/8/2023 2012 by Nohemi Duverney, RN  Outcome: Progressing Towards Goal

## 2023-01-09 NOTE — PROGRESS NOTES
Hospitalist Progress Note    Patient: Carol Smiht MRN: [id number]  CSN: [id number]    YOB: 1927  Age: 80 y.o. Sex: female    DOA: 1/2/2023 LOS:  LOS: 7 days          Chief Complaint:    SOB      Assessment/Plan     SOB  79 y/o female with HTN, hypothyroidism, and aortic stenosis is admitted for an acute CHF exacerbation with new onset atrial fibrillation. Acute CHF exacerbation-  Better  Change to lasix PO  Replace potassium PO  Strict I/O    Echocardiogram showed normal left ventricular systolic function, EF of 60 to 65%, normal wall motion severe aortic stenosis  Cardiology following     New onset atrial fibrillation-  rate controlled on lopressor  High risk for fall, not on anticoagulation     Severe Aortic stenosis-  declined TAVR in the past     Hypokalemia-daily Kdur     HTN  Monitor BP     Hypothyroidism  TSH 6.23  Continue synthroid    Ready to d/c, SNF planned  Disposition :  Patient Active Problem List   Diagnosis Code    Fall W19. XXXA    Rib contusion, right, initial encounter S20.211A    Right shoulder injury, initial encounter S49. 91XA    Acute exacerbation of CHF (congestive heart failure) (HCC) I50.9    HTN (hypertension) I10    Hypothyroidism E03.9    New onset atrial fibrillation (HCC) I48.91    Aortic stenosis I35.0    Hypomagnesemia E83.42       Subjective:    I feel ok  Dry cough    I did not get a spoon for my cereal!    Review of systems:    Constitutional: denies fevers, chill  Respiratory: denies SOB  Cardiovascular: denies chest pain, palpitations  Gastrointestinal: denies nausea, vomiting, diarrhea      Vital signs/Intake and Output:  Visit Vitals  BP (!) 130/52   Pulse 73   Temp 98 °F (36.7 °C)   Resp 18   Ht 5' 1\" (1.549 m)   Wt 69.4 kg (153 lb)   SpO2 92%   BMI 28.91 kg/m²     Current Shift:  No intake/output data recorded.   Last three shifts:  01/07 1901 - 01/09 0700  In: 240 [P.O.:240]  Out: 1175 [Urine:1175]    Exam:    General: elderly WF sitting edge of bed NAD  CVS:Regular rate and rhythm, ANGELITA 2-3/6  Lungs:Clear to auscultation bilaterally, no wheezes, rhonchi, or rales  Abdomen: Soft, Nontender, No distention  Extremities: No C/C/E, pulses palpable 2+  Neuro:grossly normal , follows commands  Psych:appropriate                Labs: Results:       Chemistry Recent Labs     01/09/23 0150 01/08/23 0137 01/07/23 0132   * 92 100*   * 133* 135*   K 3.2* 3.5 3.5   CL 94* 95* 97*   CO2 29 29 30   BUN 22* 20* 20*   CREA 0.95 0.86 0.91   CA 8.8 8.6 8.4*   AGAP 8 9 8   BUCR 23* 23* 22*   AP 93 97  --    TP 6.5 6.2*  --    ALB 3.0* 3.2*  --    GLOB 3.5 3.0  --    AGRAT 0.9 1.1  --       CBC w/Diff Recent Labs     01/09/23 0150 01/08/23 0137   WBC 7.2 7.2   RBC 3.86* 3.77*   HGB 12.2 12.2   HCT 36.7 37.4    265   GRANS 64 64   LYMPH 17* 15*   EOS 5 7*      Cardiac Enzymes No results for input(s): CPK, CKND1, ENZO in the last 72 hours. No lab exists for component: CKRMB, TROIP   Coagulation No results for input(s): PTP, INR, APTT, INREXT in the last 72 hours. Lipid Panel No results found for: CHOL, CHOLPOCT, CHOLX, CHLST, CHOLV, 792429, HDL, HDLP, LDL, LDLC, DLDLP, 490515, VLDLC, VLDL, TGLX, TRIGL, TRIGP, TGLPOCT, CHHD, CHHDX   BNP No results for input(s): BNPP in the last 72 hours.    Liver Enzymes Recent Labs     01/09/23 0150   TP 6.5   ALB 3.0*   AP 93      Thyroid Studies Lab Results   Component Value Date/Time    TSH 6.23 (H) 01/02/2023 01:45 PM        Procedures/imaging: see electronic medical records for all procedures/Xrays and details which were not copied into this note but were reviewed prior to creation of Laxmi Soriano MD

## 2023-01-09 NOTE — PROGRESS NOTES
Problem: Falls - Risk of  Goal: *Absence of Falls  Description: Document Dennie Oak Fall Risk and appropriate interventions in the flowsheet.   Outcome: Progressing Towards Goal  Note: Fall Risk Interventions:  Mobility Interventions: Bed/chair exit alarm         Medication Interventions: Bed/chair exit alarm    Elimination Interventions: Call light in reach    History of Falls Interventions: Door open when patient unattended, Evaluate medications/consider consulting pharmacy         Problem: Patient Education: Go to Patient Education Activity  Goal: Patient/Family Education  Outcome: Progressing Towards Goal     Problem: Pain  Goal: *Control of Pain  Outcome: Progressing Towards Goal     Problem: Patient Education: Go to Patient Education Activity  Goal: Patient/Family Education  Outcome: Progressing Towards Goal     Problem: Patient Education: Go to Patient Education Activity  Goal: Patient/Family Education  Outcome: Progressing Towards Goal     Problem: Heart Failure: Day 1  Goal: Off Pathway (Use only if patient is Off Pathway)  Outcome: Progressing Towards Goal  Goal: Activity/Safety  Outcome: Progressing Towards Goal  Goal: Consults, if ordered  Outcome: Progressing Towards Goal  Goal: Diagnostic Test/Procedures  Outcome: Progressing Towards Goal  Goal: Nutrition/Diet  Outcome: Progressing Towards Goal  Goal: Discharge Planning  Outcome: Progressing Towards Goal  Goal: Medications  Outcome: Progressing Towards Goal  Goal: Respiratory  Outcome: Progressing Towards Goal  Goal: Treatments/Interventions/Procedures  Outcome: Progressing Towards Goal  Goal: Psychosocial  Outcome: Progressing Towards Goal  Goal: *Oxygen saturation within defined limits  Outcome: Progressing Towards Goal  Goal: *Hemodynamically stable  Outcome: Progressing Towards Goal  Goal: *Optimal pain control at patient's stated goal  Outcome: Progressing Towards Goal  Goal: *Anxiety reduced or absent  Outcome: Progressing Towards Goal     Problem: Heart Failure: Day 2  Goal: Off Pathway (Use only if patient is Off Pathway)  Outcome: Progressing Towards Goal  Goal: Activity/Safety  Outcome: Progressing Towards Goal  Goal: Consults, if ordered  Outcome: Progressing Towards Goal  Goal: Diagnostic Test/Procedures  Outcome: Progressing Towards Goal  Goal: Nutrition/Diet  Outcome: Progressing Towards Goal  Goal: Discharge Planning  Outcome: Progressing Towards Goal  Goal: Medications  Outcome: Progressing Towards Goal  Goal: Respiratory  Outcome: Progressing Towards Goal  Goal: Treatments/Interventions/Procedures  Outcome: Progressing Towards Goal  Goal: Psychosocial  Outcome: Progressing Towards Goal  Goal: *Oxygen saturation within defined limits  Outcome: Progressing Towards Goal  Goal: *Hemodynamically stable  Outcome: Progressing Towards Goal  Goal: *Optimal pain control at patient's stated goal  Outcome: Progressing Towards Goal  Goal: *Anxiety reduced or absent  Outcome: Progressing Towards Goal  Goal: *Demonstrates progressive activity  Outcome: Progressing Towards Goal     Problem: Heart Failure: Day 3  Goal: Off Pathway (Use only if patient is Off Pathway)  Outcome: Progressing Towards Goal  Goal: Activity/Safety  Outcome: Progressing Towards Goal  Goal: Diagnostic Test/Procedures  Outcome: Progressing Towards Goal  Goal: Nutrition/Diet  Outcome: Progressing Towards Goal  Goal: Discharge Planning  Outcome: Progressing Towards Goal  Goal: Medications  Outcome: Progressing Towards Goal  Goal: Respiratory  Outcome: Progressing Towards Goal  Goal: Treatments/Interventions/Procedures  Outcome: Progressing Towards Goal  Goal: Psychosocial  Outcome: Progressing Towards Goal  Goal: *Oxygen saturation within defined limits  Outcome: Progressing Towards Goal  Goal: *Hemodynamically stable  Outcome: Progressing Towards Goal  Goal: *Optimal pain control at patient's stated goal  Outcome: Progressing Towards Goal  Goal: *Anxiety reduced or absent  Outcome: Progressing Towards Goal  Goal: *Demonstrates progressive activity  Outcome: Progressing Towards Goal     Problem: Heart Failure: Day 4  Goal: Off Pathway (Use only if patient is Off Pathway)  Outcome: Progressing Towards Goal  Goal: Activity/Safety  Outcome: Progressing Towards Goal  Goal: Diagnostic Test/Procedures  Outcome: Progressing Towards Goal  Goal: Nutrition/Diet  Outcome: Progressing Towards Goal  Goal: Discharge Planning  Outcome: Progressing Towards Goal  Goal: Medications  Outcome: Progressing Towards Goal  Goal: Respiratory  Outcome: Progressing Towards Goal  Goal: Treatments/Interventions/Procedures  Outcome: Progressing Towards Goal  Goal: Psychosocial  Outcome: Progressing Towards Goal  Goal: *Oxygen saturation within defined limits  Outcome: Progressing Towards Goal  Goal: *Hemodynamically stable  Outcome: Progressing Towards Goal  Goal: *Optimal pain control at patient's stated goal  Outcome: Progressing Towards Goal  Goal: *Anxiety reduced or absent  Outcome: Progressing Towards Goal  Goal: *Demonstrates progressive activity  Outcome: Progressing Towards Goal     Problem: Heart Failure: Day 5  Goal: Off Pathway (Use only if patient is Off Pathway)  Outcome: Progressing Towards Goal  Goal: Activity/Safety  Outcome: Progressing Towards Goal  Goal: Diagnostic Test/Procedures  Outcome: Progressing Towards Goal  Goal: Nutrition/Diet  Outcome: Progressing Towards Goal  Goal: Discharge Planning  Outcome: Progressing Towards Goal  Goal: Medications  Outcome: Progressing Towards Goal  Goal: Respiratory  Outcome: Progressing Towards Goal  Goal: Treatments/Interventions/Procedures  Outcome: Progressing Towards Goal  Goal: Psychosocial  Outcome: Progressing Towards Goal

## 2023-01-09 NOTE — PROGRESS NOTES
1/9/2023  PT treatment not completed due to:  [] Off Unit for testing/procedure  [] Pain  [x] Eating  [] Patient too lethargic  [] Nausea/vomiting  [] Dialysis treatment in progress   [] Other:  Will f/u at later time as pt schedule allows. Thank you.    Marquise Tolentino, PT

## 2023-01-09 NOTE — PROGRESS NOTES
Cardiology Progress Note        Patient: Mj Phoenix        Sex: female          DOA: 1/2/2023  YOB: 1927      Age:  80 y.o.        LOS:  LOS: 7 days   Assessment/Plan     Principal Problem:    Acute exacerbation of CHF (congestive heart failure) ([facility] [address] (1/2/2023)    Active Problems:    HTN (hypertension) (1/2/2023)      Hypothyroidism (1/2/2023)      New onset atrial fibrillation ([facility] [address] (1/2/2023)      Aortic stenosis (1/2/2023)      Hypomagnesemia (1/2/2023)      Plan:  1/9/2023  No chest pain  Shortness of breath is better  Cardiac telemetry is stable  Tolerating metoprolol  Continue with p.o. Lasix  Discussed with patient. 1/6/2022  Sitting on the side of the bed  No chest pain  Tolerating beta-blocker  Continue with metoprolol tartrate 25 mg twice daily  Continue with Lasix  Patient and daughter does not want therapeutic anticoagulation for paroxysmal atrial fibrillation due to history of recurrent fall/advanced age  Please call on-call cardiologist if any questions or concerns during the weekend  Discussed with the patient and daughter    Cardiac telemetry with PVCs  Patient is complaining of cough on and off with minimal sputum  DC valsartan  Increase metoprolol titrate from 12.5 mg to 25 mg twice daily  Discussed with patient. I have also tried to call her daughter again today unable to get connected. Discussed with patient and daughter in the room  Daughter at this point agreed not to start therapeutic anticoagulation  Continue with current dose of metoprolol tartrate 25 mg twice daily  Continue with diuretics Lasix                  Subjective:    cc:  Shortness of breath        REVIEW OF SYSTEMS:     General: No fevers or chills. Cardiovascular: No chest pain or pressure. No palpitations.  No ankle swelling  Pulmonary: No SOB, orthopnea, PND  Gastrointestinal: No nausea, vomiting or diarrhea      Objective:      Visit Vitals  BP (!) 130/52   Pulse 73   Temp 98 °F (36.7 °C)   Resp 18   Ht 5' 1\" (1.549 m)   Wt 69.4 kg (153 lb)   SpO2 92%   BMI 28.91 kg/m²     Body mass index is 28.91 kg/m². Physical Exam:  General Appearance: Comfortable, not using accessory muscles of respiration. NECK: No JVD, no thyroidomeglay. LUNGS: Clear bilaterally. HEART: S1+S2 audible, grade 3 ejection systolic murmur at aortic area    ABD: Non-tender, BS Audible    EXT: No edema, and no cysnosis. VASCULAR EXAM: Pulses are intact. PSYCHIATRIC EXAM: Mood is appropriate.     Medication:  Current Facility-Administered Medications   Medication Dose Route Frequency    [START ON 1/10/2023] enoxaparin (LOVENOX) injection 70 mg  1 mg/kg SubCUTAneous Q24H    [START ON 1/10/2023] furosemide (LASIX) tablet 40 mg  40 mg Oral DAILY    potassium chloride (K-DUR, KLOR-CON M20) SR tablet 40 mEq  40 mEq Oral BID    guaiFENesin ER (MUCINEX) tablet 1,200 mg  1,200 mg Oral BID    metoprolol tartrate (LOPRESSOR) tablet 25 mg  25 mg Oral Q12H    levothyroxine (SYNTHROID) tablet 100 mcg  100 mcg Oral ACB    pantoprazole (PROTONIX) tablet 40 mg  40 mg Oral ACB    calcium-vitamin D (OS-NOÉ +D3) 500 mg-200 unit per tablet 1 Tablet  1 Tablet Oral DAILY    cyanocobalamin (VITAMIN B12) tablet 250 mcg  250 mcg Oral DAILY    benzonatate (TESSALON) capsule 100 mg  100 mg Oral TID PRN    sodium chloride (NS) flush 5-40 mL  5-40 mL IntraVENous Q8H    sodium chloride (NS) flush 5-40 mL  5-40 mL IntraVENous PRN    acetaminophen (TYLENOL) tablet 650 mg  650 mg Oral Q6H PRN    Or    acetaminophen (TYLENOL) suppository 650 mg  650 mg Rectal Q6H PRN    polyethylene glycol (MIRALAX) packet 17 g  17 g Oral DAILY PRN    ondansetron (ZOFRAN ODT) tablet 4 mg  4 mg Oral Q8H PRN    Or    ondansetron (ZOFRAN) injection 4 mg  4 mg IntraVENous Q6H PRN               Lab/Data Reviewed:  Procedures/imaging: see electronic medical records for all procedures/Xrays   and details which were not copied into this note but were reviewed prior to creation of Plan       All lab results for the last 24 hours reviewed. Recent Labs     01/09/23  0150 01/08/23  0137   WBC 7.2 7.2   HGB 12.2 12.2   HCT 36.7 37.4    265       Recent Labs     01/09/23  0150 01/08/23  0137 01/07/23  0132   * 133* 135*   K 3.2* 3.5 3.5   CL 94* 95* 97*   CO2 29 29 30   * 92 100*   BUN 22* 20* 20*   CREA 0.95 0.86 0.91   CA 8.8 8.6 8.4*         RADIOLOGY:  CT Results  (Last 48 hours)      None          CXR Results  (Last 48 hours)                 01/09/23 1018  XR CHEST PA LAT Final result    Impression:      No acute findings in the chest.       Emphysema with chronic interstitial markings. Narrative:  EXAM: XR CHEST PA LAT       CLINICAL INDICATION/HISTORY: follow up cough   -Additional: None       COMPARISON: 1/2/2023       TECHNIQUE: PA and lateral views of the chest       _______________       FINDINGS:       HEART AND MEDIASTINUM: Cardiac size and mediastinal contours are within normal   limits       LUNGS AND PLEURAL SPACES: Emphysema with chronic interstitial markings. No focal   pneumonic consolidation, pneumothorax or pleural effusion       BONY THORAX AND SOFT TISSUES: Osteopenia. Left shoulder prosthesis.        _______________                     Cardiology Procedures:   Results for orders placed or performed during the hospital encounter of 01/02/23   EKG, 12 LEAD, INITIAL   Result Value Ref Range    Ventricular Rate 97 BPM    QRS Duration 110 ms    Q-T Interval 394 ms    QTC Calculation (Bezet) 500 ms    Calculated R Axis 89 degrees    Calculated T Axis -92 degrees    Diagnosis       Atrial fibrillation with a competing junctional pacemaker with premature   ventricular or aberrantly conducted complexes  Septal infarct , age undetermined  Marked ST abnormality, possible inferolateral subendocardial injury  Abnormal ECG  When compared with ECG of 22-MAR-2022 13:25,  Atrial fibrillation has replaced Sinus rhythm  Right bundle branch block is no longer present  Septal infarct is now present  Confirmed by Martinez May MD. ([id number]) on 1/3/2023 10:13:42 PM        Echo Results  (Last 48 hours)      None         Cardiolite (Tc-99m Sestamibi) stress test    Signed By: Kristofer Faye MD     January 9, 2023

## 2023-01-09 NOTE — PROGRESS NOTES
Problem: Falls - Risk of  Goal: *Absence of Falls  Description: Document Clearence Skates Fall Risk and appropriate interventions in the flowsheet.   Outcome: Progressing Towards Goal  Note: Fall Risk Interventions:  Mobility Interventions: Bed/chair exit alarm         Medication Interventions: Bed/chair exit alarm    Elimination Interventions: Call light in reach    History of Falls Interventions: Door open when patient unattended, Evaluate medications/consider consulting pharmacy         Problem: Patient Education: Go to Patient Education Activity  Goal: Patient/Family Education  Outcome: Progressing Towards Goal     Problem: Heart Failure: Day 1  Goal: Off Pathway (Use only if patient is Off Pathway)  Outcome: Progressing Towards Goal  Goal: Activity/Safety  Outcome: Progressing Towards Goal  Goal: Consults, if ordered  Outcome: Progressing Towards Goal  Goal: Diagnostic Test/Procedures  Outcome: Progressing Towards Goal  Goal: Nutrition/Diet  Outcome: Progressing Towards Goal  Goal: Discharge Planning  Outcome: Progressing Towards Goal  Goal: Medications  Outcome: Progressing Towards Goal  Goal: Respiratory  Outcome: Progressing Towards Goal  Goal: Treatments/Interventions/Procedures  Outcome: Progressing Towards Goal  Goal: Psychosocial  Outcome: Progressing Towards Goal  Goal: *Oxygen saturation within defined limits  Outcome: Progressing Towards Goal  Goal: *Hemodynamically stable  Outcome: Progressing Towards Goal  Goal: *Optimal pain control at patient's stated goal  Outcome: Progressing Towards Goal  Goal: *Anxiety reduced or absent  Outcome: Progressing Towards Goal     Problem: Heart Failure: Day 5  Goal: Psychosocial  Outcome: Progressing Towards Goal

## 2023-01-09 NOTE — PROGRESS NOTES
Problem: Mobility Impaired (Adult and Pediatric)  Goal: *Acute Goals and Plan of Care (Insert Text)  Description: Physical Therapy Goals   Initiated 1/6/2023 and to be accomplished within 7 day(s)  1. Patient will move from supine <> sit with mod I in prep for out of bed activity and change of position. 2.  Patient will perform sit<> stand with S with RW in prep for transfers/ambulation. 3.  Patient will transfer from bed <> chair with S with RW for time up in chair for completion of ADL activity. 4.  Patient will ambulate 100 feet with S/RW for improved functional mobility at discharge. Outcome: Progressing Towards Goal  PHYSICAL THERAPY TREATMENT    Patient: Duarte Franco (89 y.o. female)  Date: 1/9/2023  Diagnosis: CHF (congestive heart failure) ([facility]) [I50.9] Acute exacerbation of CHF (congestive heart failure) ([facility] [address]  Precautions: Fall  PLOF: amb with FWW, living at [facility], walks to meals in dining room mod I.  Uses bed rail (anchored between mattress and box spring) for assist with transition supine <>sit. ASSESSMENT:  Pt found standing at walker in room on way to bathroom on PT arrival.  [facility] in place and tubing of same wrapped around tray table, making fall hazard. Pur Benson Dinh removed, pt educated in safety awareness and assisted to bathroom. Required CGA and vc to use GB during stand >sit on commode. Pt educated in need to call and wait for staff assist in order to limit fall risk in the future. Pt expressed understanding, but also stated \"Sometimes when I have to go, I have to go\". Pt performed self hygiene care with supervision, then completed GT/RW/CGA ~70 ft in andino. Patricia slow, step length short. Returned to room and back to bed with SBA and additional time to bring LE's onto mattress. Pt left in NAD. Again reminded to call and wait for staff assist prior to exiting bed. Bed alarm engaged and nurse notified.   Progression toward goals:   [x]      Improving appropriately and progressing toward goals  []      Improving slowly and progressing toward goals  []      Not making progress toward goals and plan of care will be adjusted     PLAN:  Patient continues to benefit from skilled intervention to address the above impairments. Continue treatment per established plan of care. Discharge Recommendations: Skilled Nursing Facility  Further Equipment Recommendations for Discharge:  N/A    AMPAC: 17/20    This AMPAC score should be considered in conjunction with interdisciplinary team recommendations to determine the most appropriate discharge setting. Patient's social support, diagnosis, medical stability, and prior level of function should also be taken into consideration. SUBJECTIVE:   Patient stated I was on my way to the bathroom.     OBJECTIVE DATA SUMMARY:   Critical Behavior:  Neurologic State: Alert  Orientation Level: Oriented X4  Cognition: Appropriate decision making, Appropriate for age attention/concentration, Decreased safety awareness  Safety/Judgement: Fall prevention  Functional Mobility Training:  Bed Mobility:  Sit to Supine: Stand-by assistance; Additional time  Transfers:  Sit to Stand: Contact guard assistance  Stand to Sit: Contact guard assistance (vc to use GB in bathroom at commode)  Balance:  Sitting: Intact  Standing: Intact; With support    Ambulation/Gait Training:  Distance (ft): 70 Feet (ft)  Assistive Device: Gait belt;Walker, rolling  Ambulation - Level of Assistance: Contact guard assistance  Gait Abnormalities: Decreased step clearance  Speed/Patricia: Slow  Step Length: Right shortened;Left shortened  Interventions: Safety awareness training;Verbal cues  Pain:  Pain level pre-treatment: 0/10  Pain level post-treatment: 0/10   Pain Intervention(s): Medication (see MAR);  Rest, Ice, Repositioning   Response to intervention: Nurse notified, See doc flow  Activity Tolerance:   Fair   Please refer to the flowsheet for vital signs taken during this treatment. After treatment:   [] Patient left in no apparent distress sitting up in chair  [x] Patient left in no apparent distress in bed  [x] Call bell left within reach  [x] Nursing notified  [] Caregiver present  [x] Bed alarm activated  [] SCDs applied      COMMUNICATION/EDUCATION:   [x]         Role of Physical Therapy in the acute care setting. [x]         Fall prevention education was provided and the patient/caregiver indicated understanding. [x]         Patient/family have participated as able in working toward goals and plan of care. [x]         Patient/family agree to work toward stated goals and plan of care. []         Patient understands intent and goals of therapy, but is neutral about his/her participation. []         Patient is unable to participate in stated goals/plan of care: ongoing with therapy staff.  []         Other:        Rob Chapa PT   Time Calculation: 24 mins    [address] [place] [address] [address] [address] AM-PAC® Basic Mobility Inpatient Short Form (6-Clicks) Version 2    How much HELP from another person does the patient currently need    (If the patient hasn't done an activity recently, how much help from another person do you think he/she would need if he/she tried?)   Total (Total A or Dep)   A Lot  (Mod to Max A)   A Little (Sup or Min A)   None (Mod I to I)   Turning from your back to your side while in a flat bed without using bedrails? [] 1 [] 2 [] 3 [x] 4   2. Moving from lying on your back to sitting on the side of a flat bed without using bedrails? [] 1 [] 2 [] 3 [x] 4   3. Moving to and from a bed to a chair (including a wheelchair)? [] 1 [] 2 [x] 3 [] 4   4. Standing up from a chair using your arms (e.g., wheelchair, or bedside chair)? [] 1 [] 2 [x] 3 [] 4   5. Walking in hospital room? [] 1 [] 2 [x] 3 [] 4   6. Climbing 3-5 steps with a railing?+   [] 1 [] 2 [] 3 [] 4   +If stair climbing cannot be assessed, skip item #6. Sum responses from items 1-5.        Based on an AM-PAC score of /24 (17/20 if omitting stairs) and their current functional mobility deficits, it is recommended that the patient have 3-5 sessions per week of Physical Therapy at d/c to increase the patient's independence.

## 2023-01-09 NOTE — PROGRESS NOTES
D/c plan: skilled nursing pending acceptance and insurance auth. CM met w/ pt and dtr; Michelle Davesin at bedside. Discussed SNF per therapy's recommendation. Pt and dtr are in agreement. Provided FOC. 1st choice is [facility] and second is [facility]. CMS to send referrals. CM will follow-up. Care Management Interventions  PCP Verified by CM:  Yes  Mode of Transport at Discharge: BLS  Transition of Care Consult (CM Consult): SNF (.)  Partner SNF: Yes  Discharge Durable Medical Equipment: No (Pt has a RW.)  Health Maintenance Reviewed: Yes  Physical Therapy Consult: Yes  Occupational Therapy Consult: Yes  Support Systems: Child(ana), Assisted Living  Confirm Follow Up Transport: Family  The Plan for Transition of Care is Related to the Following Treatment Goals : SNF  The Patient and/or Patient Representative was Provided with a Choice of Provider and Agrees with the Discharge Plan?: Yes  Freedom of Choice List was Provided with Basic Dialogue that Supports the Patient's Individualized Plan of Care/Goals, Treatment Preferences and Shares the Quality Data Associated with the Providers?:  ([facility] and [facility] )  Discharge Location  Patient Expects to be Discharged to[de-identified] Skilled nursing facility

## 2023-01-10 LAB
ANION GAP SERPL CALC-SCNC: 8 MMOL/L (ref 3–18)
BUN SERPL-MCNC: 27 MG/DL (ref 7–18)
BUN/CREAT SERPL: 27 (ref 12–20)
CALCIUM SERPL-MCNC: 8.7 MG/DL (ref 8.5–10.1)
CHLORIDE SERPL-SCNC: 97 MMOL/L (ref 100–111)
CO2 SERPL-SCNC: 29 MMOL/L (ref 21–32)
COVID-19 RAPID TEST, COVR: NOT DETECTED
CREAT SERPL-MCNC: 1 MG/DL (ref 0.6–1.3)
GLUCOSE SERPL-MCNC: 96 MG/DL (ref 74–99)
MAGNESIUM SERPL-MCNC: 2.6 MG/DL (ref 1.6–2.6)
POTASSIUM SERPL-SCNC: 4.4 MMOL/L (ref 3.5–5.5)
SODIUM SERPL-SCNC: 134 MMOL/L (ref 136–145)
SOURCE, COVRS: NORMAL

## 2023-01-10 PROCEDURE — 74011250637 HC RX REV CODE- 250/637: Performed by: INTERNAL MEDICINE

## 2023-01-10 PROCEDURE — 74011250637 HC RX REV CODE- 250/637: Performed by: HOSPITALIST

## 2023-01-10 PROCEDURE — 87635 SARS-COV-2 COVID-19 AMP PRB: CPT

## 2023-01-10 PROCEDURE — 74011250637 HC RX REV CODE- 250/637: Performed by: FAMILY MEDICINE

## 2023-01-10 PROCEDURE — 36415 COLL VENOUS BLD VENIPUNCTURE: CPT

## 2023-01-10 PROCEDURE — 74011000250 HC RX REV CODE- 250: Performed by: FAMILY MEDICINE

## 2023-01-10 PROCEDURE — 74011250636 HC RX REV CODE- 250/636: Performed by: FAMILY MEDICINE

## 2023-01-10 PROCEDURE — 97116 GAIT TRAINING THERAPY: CPT

## 2023-01-10 PROCEDURE — 83735 ASSAY OF MAGNESIUM: CPT

## 2023-01-10 PROCEDURE — 65270000046 HC RM TELEMETRY

## 2023-01-10 PROCEDURE — 80048 BASIC METABOLIC PNL TOTAL CA: CPT

## 2023-01-10 RX ORDER — CYANOCOBALAMIN (VITAMIN B-12) 250 MCG
250 TABLET ORAL DAILY
Qty: 30 TABLET | Refills: 0 | Status: SHIPPED
Start: 2023-01-11 | End: 2023-01-11 | Stop reason: SDUPTHER

## 2023-01-10 RX ORDER — METOPROLOL TARTRATE 25 MG/1
25 TABLET, FILM COATED ORAL EVERY 12 HOURS
Qty: 20 TABLET | Refills: 0 | Status: SHIPPED
Start: 2023-01-10 | End: 2023-01-11 | Stop reason: SDUPTHER

## 2023-01-10 RX ORDER — FUROSEMIDE 40 MG/1
40 TABLET ORAL DAILY
Qty: 30 TABLET | Refills: 1 | Status: SHIPPED
Start: 2023-01-10 | End: 2023-01-11 | Stop reason: SDUPTHER

## 2023-01-10 RX ADMIN — GUAIFENESIN 1200 MG: 600 TABLET, EXTENDED RELEASE ORAL at 08:09

## 2023-01-10 RX ADMIN — METOPROLOL TARTRATE 25 MG: 25 TABLET, FILM COATED ORAL at 21:46

## 2023-01-10 RX ADMIN — Medication 1 TABLET: at 08:08

## 2023-01-10 RX ADMIN — LEVOTHYROXINE SODIUM 100 MCG: 0.1 TABLET ORAL at 06:12

## 2023-01-10 RX ADMIN — FUROSEMIDE 40 MG: 40 TABLET ORAL at 08:08

## 2023-01-10 RX ADMIN — GUAIFENESIN 1200 MG: 600 TABLET, EXTENDED RELEASE ORAL at 21:46

## 2023-01-10 RX ADMIN — PANTOPRAZOLE SODIUM 40 MG: 40 TABLET, DELAYED RELEASE ORAL at 06:12

## 2023-01-10 RX ADMIN — POTASSIUM CHLORIDE 40 MEQ: 1500 TABLET, EXTENDED RELEASE ORAL at 08:09

## 2023-01-10 RX ADMIN — CYANOCOBALAMIN TAB 500 MCG 250 MCG: 500 TAB at 08:09

## 2023-01-10 RX ADMIN — SODIUM CHLORIDE, PRESERVATIVE FREE 10 ML: 5 INJECTION INTRAVENOUS at 14:23

## 2023-01-10 RX ADMIN — METOPROLOL TARTRATE 25 MG: 25 TABLET, FILM COATED ORAL at 08:08

## 2023-01-10 RX ADMIN — ENOXAPARIN SODIUM 70 MG: 100 INJECTION SUBCUTANEOUS at 08:08

## 2023-01-10 NOTE — DISCHARGE SUMMARY
[address]     Name:  James Pelaez  MR#:   [id number]  :  1927  ACCOUNT #:  [de-identified]  ADMIT DATE:  2023  DISCHARGE DATE:  01/10/2023      DISCHARGE DIAGNOSES:  1. Acute congestive heart failure exacerbation. 2.  Diastolic dysfunction. 3.  Severe aortic stenosis. 4.  New-onset atrial fibrillation. 5.  Hypertension. 6.  Hypokalemia. 7.  Hypothyroidism. The patient is going to a skilled nursing facility. Her code status is do not resuscitate. Her diet is regular, low fat, low cholesterol with supplements, breakfast, lunch and dinner. MEDICATIONS:  Her meds for discharge include the following,  1. Calcium with vitamin D one tablet daily. 2.  B12 250 mcg daily. 3.  Lasix 40 mg daily. 4.  Guaifenesin 1200 mg twice daily as needed. 5.  Synthroid 100 mcg daily. 6.  Lidocaine patch every 12 hours as needed. 7.  Metoprolol 25 mg twice daily. 8.  Prilosec 20 mg daily. CONSULTANTS:  Dr. Nina Benton, Cardiology. HOSPITAL SUMMARY:  This is a 80-year-old female who actually looks markedly younger than her stated age. She was admitted on the aforementioned date. She has hypothyroidism, hypertension and aortic stenosis. She has declined a TAVR procedure in the past due to risk of anesthesia. She came in with increasing shortness of breath and cough. She was unsteady on her feet. There was no evidence for acute coronary syndrome. She was diuresed effectively with Lasix. Monitored closely on telemetry. She again declined any further evaluation for intervention for her aortic valve disease, beta-blocker was adjusted here. The patient was unable to make a final decision about anticoagulation. She did receive Lovenox while here, but at this point with her advanced age and history of recurrent falling, likely no anticoagulation is preferable per cardiology's report. Potassium has been repleted. She is feeling better.   She has been stable medically the last few days, awaiting transition to a skilled nursing facility as she continues to be weakened, and Physical Therapy has evaluated her here and recommended rehab. So with that today, she is awake and alert, in no acute distress, sitting comfortably in the chair watching television. Her last CBC shows a normal H and H and platelet count. Her last metabolic panel, potassium is 4.4, creatinine is 1.0. There was no evidence for pulmonary embolism on her CT angiogram of chest when she came in. Echocardiogram shows an ejection fraction of 60%-65%. Clinically, she looks stable. She is awake, alert. Lungs are clear. Cardiac exam, systolic ejection murmur II-III/VI. Abdomen benign. Lower extremities, no pitting edema. Blood pressure is 144/65, pulse 72, temp 97.5, respiratory rate 18, SaO2 is 100% on room air. She is stable for release to the facility when the arrangements are made this afternoon. Konrad Roberts, her PCP, whom she should follow with after discharge from skilled nursing. Again, she has a do not resuscitate code status. Monitor for CHF symptomatology, fluid overload and monitor her fluid intake closely. She is stable to release today. Forty minutes discharge time.      No change in summary hold potassium on discharge  NEEDS follow up BMP  when sees her pcp  Rajiv Taylor MD      [id number]  D:  01/10/2023 12:08  T:  01/10/2023 12:52  JOB #:  [id number]  CC:  Konrad Roberts DO

## 2023-01-10 NOTE — ROUTINE PROCESS
Bedside and Verbal shift change report given to Kira  (oncoming nurse) by Alok Fontenot RN  (offgoing nurse). Report given with SBAR, Kardex, Intake/Output and Recent Results.

## 2023-01-10 NOTE — PROGRESS NOTES
Comprehensive Nutrition Assessment    Type and Reason for Visit: Initial, RD nutrition re-screen/LOS    Nutrition Recommendations/Plan:   Will place Ensure TID order as there is a comment for ensure in diet order. Continue to monitor PO intake. Malnutrition Assessment:  Malnutrition Status: At risk for malnutrition (specify) (01/10/23 2756)      Nutrition Assessment:    Nkfa. 79 y/o female with HTN, hypothyroidism, and aortic stenosis. Admitted with acute CHF exacerbation (better) with new onset atrial fibrillation (controlled). Wt hx reviewed: 149lb (7/2022), 151lb (4/2022), 150lb (3/2022), 152lb (1/2022); wt stable within last year. Minimal PO documented: % x1. Noted in diet order \"ensure with meals\". Noted per CM, expected to discharge today to [place] [id number]. Nutrition Related Findings:    Na 134, GFR 52, BUN 27. Os david + D3, lasix, synthroid, protonix miralax prn, KCl. Wound Type: None    Current Nutrition Intake & Therapies:  Average Meal Intake: Unable to assess  Average Supplement Intake: None ordered  ADULT DIET Regular; Low Fat/Low Chol/High Fiber/AYALA; Low Sodium (2 gm)    Anthropometric Measures:  Height: 5' 1\" (154.9 cm)  Ideal Body Weight (IBW): 105 lbs (48 kg)     Current Body Wt:  68.9 kg (151 lb 14.4 oz) (1/10/23), 144.7 % IBW. Bed scale  Current BMI (kg/m2): 28.7  Usual Body Weight: 67.6 kg (149 lb) (7/2022)  % Weight Change (Calculated): 1.9                    BMI Category: Overweight (BMI 25.0-29. 9)    Estimated Daily Nutrient Needs:  Energy Requirements Based On: Formula  Weight Used for Energy Requirements: Current (MSJ x1.2-1.3)  Energy (kcal/day): 4241-7669  Weight Used for Protein Requirements: Current (1-1.1g)  Protein (g/day): 69-76  Method Used for Fluid Requirements: 1 ml/kcal  Fluid (ml/day): 9568-4987    Nutrition Diagnosis:   No nutrition diagnosis at this time    Nutrition Interventions:   Food and/or Nutrient Delivery: Continue current diet, Start oral nutrition supplement  Nutrition Education/Counseling: No recommendations at this time  Coordination of Nutrition Care: Continue to monitor while inpatient, Interdisciplinary rounds       Goals:     Goals: PO intake 75% or greater, by next RD assessment       Nutrition Monitoring and Evaluation:   Behavioral-Environmental Outcomes: None identified  Food/Nutrient Intake Outcomes: Food and nutrient intake, Supplement intake  Physical Signs/Symptoms Outcomes: Biochemical data, Meal time behavior, Nutrition focused physical findings, Skin, Weight, GI status    Discharge Planning:    Continue current diet    Justus Drew RD

## 2023-01-10 NOTE — PROGRESS NOTES
2310: Notified by [employer] that pt had 5 beats of Vtach. Assessed pt; pt is stable and asymptomatic. No distress noted at this time. 2313: MD Centeno paged. 2315: MD Centeno notified, orders received. [id number]: Patient remained medically stable throughout shift. No significant clinical changes noted.

## 2023-01-10 NOTE — PROGRESS NOTES
Cardiology Progress Note        Patient: Melba Espinoza        Sex: female          DOA: 1/2/2023  YOB: 1927      Age:  80 y.o.        LOS:  LOS: 8 days   Assessment/Plan     Principal Problem:    Acute exacerbation of CHF (congestive heart failure) ([facility] [address] (1/2/2023)    Active Problems:    HTN (hypertension) (1/2/2023)      Hypothyroidism (1/2/2023)      New onset atrial fibrillation ([facility] [address] (1/2/2023)      Aortic stenosis (1/2/2023)      Hypomagnesemia (1/2/2023)      Plan:  1/10/2023  No chest pain  Shortness of breath is better  Cardiac telemetry is stable  Tolerating metoprolol  Continue with p.o. Lasix  Discussed with patient. Patient is in the process of being discharged        1/6/2022  Sitting on the side of the bed  No chest pain  Tolerating beta-blocker  Continue with metoprolol tartrate 25 mg twice daily  Continue with Lasix  Patient and daughter does not want therapeutic anticoagulation for paroxysmal atrial fibrillation due to history of recurrent fall/advanced age  Please call on-call cardiologist if any questions or concerns during the weekend  Discussed with the patient and daughter    Cardiac telemetry with PVCs  Patient is complaining of cough on and off with minimal sputum  DC valsartan  Increase metoprolol titrate from 12.5 mg to 25 mg twice daily  Discussed with patient. I have also tried to call her daughter again today unable to get connected. Discussed with patient and daughter in the room  Daughter at this point agreed not to start therapeutic anticoagulation  Continue with current dose of metoprolol tartrate 25 mg twice daily  Continue with diuretics Lasix                  Subjective:    cc:  Shortness of breath        REVIEW OF SYSTEMS:     General: No fevers or chills. Cardiovascular: No chest pain or pressure. No palpitations.  No ankle swelling  Pulmonary: No SOB, orthopnea, PND  Gastrointestinal: No nausea, vomiting or diarrhea      Objective:      Visit Vitals  BP (!) 139/57   Pulse 82   Temp 97.5 °F (36.4 °C)   Resp 18   Ht 5' 1\" (1.549 m)   Wt 68.9 kg (151 lb 14.4 oz)   SpO2 98%   BMI 28.70 kg/m²     Body mass index is 28.7 kg/m². Physical Exam:  General Appearance: Comfortable, not using accessory muscles of respiration. NECK: No JVD, no thyroidomeglay. LUNGS: Clear bilaterally. HEART: S1+S2 audible, grade 3 ejection systolic murmur at aortic area    ABD: Non-tender, BS Audible    EXT: No edema, and no cysnosis. VASCULAR EXAM: Pulses are intact. PSYCHIATRIC EXAM: Mood is appropriate.     Medication:  Current Facility-Administered Medications   Medication Dose Route Frequency    [START ON 1/11/2023] potassium bicarb-citric acid (EFFER-K) tablet 20 mEq  20 mEq Oral DAILY    enoxaparin (LOVENOX) injection 70 mg  1 mg/kg SubCUTAneous Q24H    furosemide (LASIX) tablet 40 mg  40 mg Oral DAILY    guaiFENesin ER (MUCINEX) tablet 1,200 mg  1,200 mg Oral BID    metoprolol tartrate (LOPRESSOR) tablet 25 mg  25 mg Oral Q12H    levothyroxine (SYNTHROID) tablet 100 mcg  100 mcg Oral ACB    pantoprazole (PROTONIX) tablet 40 mg  40 mg Oral ACB    calcium-vitamin D (OS-NOÉ +D3) 500 mg-200 unit per tablet 1 Tablet  1 Tablet Oral DAILY    cyanocobalamin (VITAMIN B12) tablet 250 mcg  250 mcg Oral DAILY    benzonatate (TESSALON) capsule 100 mg  100 mg Oral TID PRN    sodium chloride (NS) flush 5-40 mL  5-40 mL IntraVENous Q8H    sodium chloride (NS) flush 5-40 mL  5-40 mL IntraVENous PRN    acetaminophen (TYLENOL) tablet 650 mg  650 mg Oral Q6H PRN    Or    acetaminophen (TYLENOL) suppository 650 mg  650 mg Rectal Q6H PRN    polyethylene glycol (MIRALAX) packet 17 g  17 g Oral DAILY PRN    ondansetron (ZOFRAN ODT) tablet 4 mg  4 mg Oral Q8H PRN    Or    ondansetron (ZOFRAN) injection 4 mg  4 mg IntraVENous Q6H PRN               Lab/Data Reviewed:  Procedures/imaging: see electronic medical records for all procedures/Xrays   and details which were not copied into this note but were reviewed prior to creation of Plan       All lab results for the last 24 hours reviewed. Recent Labs     01/09/23  0150 01/08/23 0137   WBC 7.2 7.2   HGB 12.2 12.2   HCT 36.7 37.4    265       Recent Labs     01/10/23  0120 01/09/23  0150 01/08/23  0137   * 131* 133*   K 4.4 3.2* 3.5   CL 97* 94* 95*   CO2 29 29 29   GLU 96 105* 92   BUN 27* 22* 20*   CREA 1.00 0.95 0.86   CA 8.7 8.8 8.6         RADIOLOGY:  CT Results  (Last 48 hours)      None          CXR Results  (Last 48 hours)                 01/09/23 1018  XR CHEST PA LAT Final result    Impression:      No acute findings in the chest.       Emphysema with chronic interstitial markings. Narrative:  EXAM: XR CHEST PA LAT       CLINICAL INDICATION/HISTORY: follow up cough   -Additional: None       COMPARISON: 1/2/2023       TECHNIQUE: PA and lateral views of the chest       _______________       FINDINGS:       HEART AND MEDIASTINUM: Cardiac size and mediastinal contours are within normal   limits       LUNGS AND PLEURAL SPACES: Emphysema with chronic interstitial markings. No focal   pneumonic consolidation, pneumothorax or pleural effusion       BONY THORAX AND SOFT TISSUES: Osteopenia. Left shoulder prosthesis.        _______________                     Cardiology Procedures:   Results for orders placed or performed during the hospital encounter of 01/02/23   EKG, 12 LEAD, INITIAL   Result Value Ref Range    Ventricular Rate 97 BPM    QRS Duration 110 ms    Q-T Interval 394 ms    QTC Calculation (Bezet) 500 ms    Calculated R Axis 89 degrees    Calculated T Axis -92 degrees    Diagnosis       Atrial fibrillation with a competing junctional pacemaker with premature   ventricular or aberrantly conducted complexes  Septal infarct , age undetermined  Marked ST abnormality, possible inferolateral subendocardial injury  Abnormal ECG  When compared with ECG of 22-MAR-2022 13:25,  Atrial fibrillation has replaced Sinus rhythm  Right bundle branch block is no longer present  Septal infarct is now present  Confirmed by Illa Spurling, MD. ([id number]) on 1/3/2023 10:13:42 PM        Echo Results  (Last 48 hours)      None         Cardiolite (Tc-99m Sestamibi) stress test    Signed By: Kristofer Faye MD     January 10, 2023

## 2023-01-10 NOTE — PROGRESS NOTES
BSSR received from [employer]. Patient A/O x $, denies pain or SOB. Medication taken. Patient able to ambulate to the bathroom with walker with minimum assist. Gait slow but steady. Standard fall precautions in place.

## 2023-01-10 NOTE — PROGRESS NOTES
Problem: Mobility Impaired (Adult and Pediatric)  Goal: *Acute Goals and Plan of Care (Insert Text)  Description: Physical Therapy Goals   Initiated 1/6/2023 and to be accomplished within 7 day(s)  1. Patient will move from supine <> sit with mod I in prep for out of bed activity and change of position. 2.  Patient will perform sit<> stand with S with RW in prep for transfers/ambulation. 3.  Patient will transfer from bed <> chair with S with RW for time up in chair for completion of ADL activity. 4.  Patient will ambulate 100 feet with S/RW for improved functional mobility at discharge. Outcome: Progressing Towards Goal   []  Patient has met MD brittani torres for d/c home   []  Recommend HH with 24 hour adult care   [x]  Benefit from additional acute PT session to address:  transferring to SNF today    PHYSICAL THERAPY TREATMENT    Patient: Amanda Negro (33 y.o. female)  Date: 1/10/2023  Diagnosis: CHF (congestive heart failure) ([facility] [address] [I50.9] Acute exacerbation of CHF (congestive heart failure) ([facility] [address]    Precautions: Fall  PLOF: ambulatory with a RW    ASSESSMENT:  Pt sitting in chair upon arrival, visitor present. No difficulty performing sit to stand. Ambulated 120ft with RW, steady slow reciprocal gt pattern, no LOB or path deviations. Returned to room and back to sitting in chair. Progression toward goals:   []      Improving appropriately and progressing toward goals  [x]      Improving slowly and progressing toward goals  []      Not making progress toward goals and plan of care will be adjusted     PLAN:  Patient continues to benefit from skilled intervention to address the above impairments. Continue treatment per established plan of care.   Discharge Recommendations: Skilled Nursing Facility  Further Equipment Recommendations for Discharge:  N/A    AMPA: 15/20    This AMPAC score should be considered in conjunction with interdisciplinary team recommendations to determine the most appropriate discharge setting. Patient's social support, diagnosis, medical stability, and prior level of function should also be taken into consideration. SUBJECTIVE:   Patient stated Ok I walked last night with someone.     OBJECTIVE DATA SUMMARY:   Critical Behavior:  Neurologic State: Alert  Orientation Level: Oriented X4  Cognition: Appropriate decision making  Safety/Judgement: Fall prevention  Functional Mobility Training:  Transfers:  Sit to Stand: Contact guard assistance  Stand to Sit: Contact guard assistance  Balance:  Sitting: Intact  Standing: Intact; With support   Ambulation/Gait Training:  Distance (ft): 120 Feet (ft)  Assistive Device: Gait belt;Walker, rolling  Ambulation - Level of Assistance: Contact guard assistance;Stand-by assistance  Gait Abnormalities: Decreased step clearance  Speed/Patricia: Slow  Step Length: Right shortened;Left shortened        Pain:  Pain level pre-treatment: 0/10  Pain level post-treatment: 0/10   Pain Intervention(s): Medication (see MAR); Rest, Ice, Repositioning   Response to intervention: Nurse notified, See doc flow    Activity Tolerance:   fair  Please refer to the flowsheet for vital signs taken during this treatment. After treatment:   [x] Patient left in no apparent distress sitting up in chair  [] Patient left in no apparent distress in bed  [x] Call bell left within reach  [] Nursing notified  [] Caregiver present  [] Bed alarm activated  [] SCDs applied      COMMUNICATION/EDUCATION:   [x]         Role of Physical Therapy in the acute care setting. [x]         Fall prevention education was provided and the patient/caregiver indicated understanding. [x]         Patient/family have participated as able in working toward goals and plan of care. [x]         Patient/family agree to work toward stated goals and plan of care. []         Patient understands intent and goals of therapy, but is neutral about his/her participation.   []         Patient is unable to participate in stated goals/plan of care: ongoing with therapy staff.  []         Other:        Rashard Nair PTA   Time Calculation: 11 mins    Eve Ruelas AM-PAC® Basic Mobility Inpatient Short Form (6-Clicks) Version 2    How much HELP from another person does the patient currently need    (If the patient hasn't done an activity recently, how much help from another person do you think he/she would need if he/she tried?)   Total (Total A or Dep)   A Lot  (Mod to Max A)   A Little (Sup or Min A)   None (Mod I to I)   Turning from your back to your side while in a flat bed without using bedrails? [] 1 [] 2 [x] 3 [] 4   2. Moving from lying on your back to sitting on the side of a flat bed without using bedrails? [] 1 [] 2 [x] 3 [] 4   3. Moving to and from a bed to a chair (including a wheelchair)? [] 1 [] 2 [x] 3 [] 4   4. Standing up from a chair using your arms (e.g., wheelchair, or bedside chair)? [] 1 [] 2 [x] 3 [] 4   5. Walking in hospital room? [] 1 [] 2 [x] 3 [] 4   6. Climbing 3-5 steps with a railing?+   [] 1 [] 2 [] 3 [] 4   +If stair climbing cannot be assessed, skip item #6. Sum responses from items 1-5. Based on an AM-PAC score of **/24 (or **/20 if omitting stairs) and their current functional mobility deficits, it is recommended that the patient have 5-7 sessions per week of Physical Therapy at d/c to increase the patient's independence. Currently, this patient demonstrates the potential endurance, and/or tolerance for 3 hours of therapy each day at d/c. Based on an AM-PAC score of **/24 (15/20 if omitting stairs) and their current functional mobility deficits, it is recommended that the patient have 3-5 sessions per week of Physical Therapy at d/c to increase the patient's independence.      Based on an AM-PAC score of **/24 (or **/20 if omitting stairs) and their current functional mobility deficits, it is recommended that the patient have 2-3 sessions per week of Physical Therapy at d/c to increase the patient's independence. At this time and based on an AM-PAC score of **/24 (or **/20 if omitting stairs), no further PT is recommended upon discharge due to (i.e. patient at baseline functional statusetc). Recommend patient returns to prior setting with prior services.

## 2023-01-10 NOTE — PROGRESS NOTES
Problem: Falls - Risk of  Goal: *Absence of Falls  Description: Document Leonora Velasco Fall Risk and appropriate interventions in the flowsheet.   Outcome: Progressing Towards Goal  Note: Fall Risk Interventions:  Mobility Interventions: Bed/chair exit alarm, Patient to call before getting OOB, Utilize walker, cane, or other assistive device         Medication Interventions: Bed/chair exit alarm, Evaluate medications/consider consulting pharmacy    Elimination Interventions: Bed/chair exit alarm, Call light in reach, Patient to call for help with toileting needs    History of Falls Interventions: Bed/chair exit alarm, Door open when patient unattended, Investigate reason for fall         Problem: Patient Education: Go to Patient Education Activity  Goal: Patient/Family Education  Outcome: Progressing Towards Goal     Problem: Pain  Goal: *Control of Pain  Outcome: Progressing Towards Goal     Problem: Patient Education: Go to Patient Education Activity  Goal: Patient/Family Education  Outcome: Progressing Towards Goal     Problem: Patient Education: Go to Patient Education Activity  Goal: Patient/Family Education  Outcome: Progressing Towards Goal     Problem: Heart Failure: Day 1  Goal: Off Pathway (Use only if patient is Off Pathway)  Outcome: Progressing Towards Goal  Goal: Activity/Safety  Outcome: Progressing Towards Goal  Goal: Consults, if ordered  Outcome: Progressing Towards Goal  Goal: Diagnostic Test/Procedures  Outcome: Progressing Towards Goal  Goal: Nutrition/Diet  Outcome: Progressing Towards Goal  Goal: Discharge Planning  Outcome: Progressing Towards Goal  Goal: Medications  Outcome: Progressing Towards Goal  Goal: Respiratory  Outcome: Progressing Towards Goal  Goal: Treatments/Interventions/Procedures  Outcome: Progressing Towards Goal  Goal: Psychosocial  Outcome: Progressing Towards Goal  Goal: *Oxygen saturation within defined limits  Outcome: Progressing Towards Goal  Goal: *Hemodynamically stable  Outcome: Progressing Towards Goal  Goal: *Optimal pain control at patient's stated goal  Outcome: Progressing Towards Goal  Goal: *Anxiety reduced or absent  Outcome: Progressing Towards Goal     Problem: Heart Failure: Day 2  Goal: Off Pathway (Use only if patient is Off Pathway)  Outcome: Progressing Towards Goal  Goal: Activity/Safety  Outcome: Progressing Towards Goal  Goal: Consults, if ordered  Outcome: Progressing Towards Goal  Goal: Diagnostic Test/Procedures  Outcome: Progressing Towards Goal  Goal: Nutrition/Diet  Outcome: Progressing Towards Goal  Goal: Discharge Planning  Outcome: Progressing Towards Goal  Goal: Medications  Outcome: Progressing Towards Goal  Goal: Respiratory  Outcome: Progressing Towards Goal  Goal: Treatments/Interventions/Procedures  Outcome: Progressing Towards Goal  Goal: Psychosocial  Outcome: Progressing Towards Goal  Goal: *Oxygen saturation within defined limits  Outcome: Progressing Towards Goal  Goal: *Hemodynamically stable  Outcome: Progressing Towards Goal  Goal: *Optimal pain control at patient's stated goal  Outcome: Progressing Towards Goal  Goal: *Anxiety reduced or absent  Outcome: Progressing Towards Goal  Goal: *Demonstrates progressive activity  Outcome: Progressing Towards Goal     Problem: Heart Failure: Day 3  Goal: Off Pathway (Use only if patient is Off Pathway)  Outcome: Progressing Towards Goal  Goal: Activity/Safety  Outcome: Progressing Towards Goal  Goal: Diagnostic Test/Procedures  Outcome: Progressing Towards Goal  Goal: Nutrition/Diet  Outcome: Progressing Towards Goal  Goal: Discharge Planning  Outcome: Progressing Towards Goal  Goal: Medications  Outcome: Progressing Towards Goal  Goal: Respiratory  Outcome: Progressing Towards Goal  Goal: Treatments/Interventions/Procedures  Outcome: Progressing Towards Goal  Goal: Psychosocial  Outcome: Progressing Towards Goal  Goal: *Oxygen saturation within defined limits  Outcome: Progressing Towards Goal  Goal: *Hemodynamically stable  Outcome: Progressing Towards Goal  Goal: *Optimal pain control at patient's stated goal  Outcome: Progressing Towards Goal  Goal: *Anxiety reduced or absent  Outcome: Progressing Towards Goal  Goal: *Demonstrates progressive activity  Outcome: Progressing Towards Goal     Problem: Heart Failure: Day 4  Goal: Off Pathway (Use only if patient is Off Pathway)  Outcome: Progressing Towards Goal  Goal: Activity/Safety  Outcome: Progressing Towards Goal  Goal: Diagnostic Test/Procedures  Outcome: Progressing Towards Goal  Goal: Nutrition/Diet  Outcome: Progressing Towards Goal  Goal: Discharge Planning  Outcome: Progressing Towards Goal  Goal: Medications  Outcome: Progressing Towards Goal  Goal: Respiratory  Outcome: Progressing Towards Goal  Goal: Treatments/Interventions/Procedures  Outcome: Progressing Towards Goal  Goal: Psychosocial  Outcome: Progressing Towards Goal  Goal: *Oxygen saturation within defined limits  Outcome: Progressing Towards Goal  Goal: *Hemodynamically stable  Outcome: Progressing Towards Goal  Goal: *Optimal pain control at patient's stated goal  Outcome: Progressing Towards Goal  Goal: *Anxiety reduced or absent  Outcome: Progressing Towards Goal  Goal: *Demonstrates progressive activity  Outcome: Progressing Towards Goal     Problem: Heart Failure: Day 5  Goal: Off Pathway (Use only if patient is Off Pathway)  Outcome: Progressing Towards Goal  Goal: Activity/Safety  Outcome: Progressing Towards Goal  Goal: Diagnostic Test/Procedures  Outcome: Progressing Towards Goal  Goal: Nutrition/Diet  Outcome: Progressing Towards Goal  Goal: Discharge Planning  Outcome: Progressing Towards Goal  Goal: Medications  Outcome: Progressing Towards Goal  Goal: Respiratory  Outcome: Progressing Towards Goal  Goal: Treatments/Interventions/Procedures  Outcome: Progressing Towards Goal  Goal: Psychosocial  Outcome: Progressing Towards Goal

## 2023-01-10 NOTE — PROGRESS NOTES
D/c plan: [facility] today via [facility] pending peer to peer being completed and insurance authorizing the SNF stay. 1510: CM spoke w/ pt's dtr: Maron Klinefelter and informed her that it is unlikely that [facility] will approve the SNF s/p a Peer to Peer and that back to the [facility] w/ [facility] for therapy would be the plan as she is ambulating 70 ft. Maron Klinefelter would like to talk to her siblings. Anticipate d/c home 1/11/23 w/ HH.    1500: CM received notification from [place] [address] that the pt's insurance is requiring a Peer to Peer by [phone] 1/11/23. [phone] Option 5. The provider must have the pt's [facility] ID number available when they call and that is [id number]. CM informed nursing, attending and dtr. CM cancelled transport. CM spoke w/ pt's dtr: Maron Klinefelter. Informed her that the pt has been accepted at [place] [address] and that insurance Dennys Hankins has been initiated. Informed her that transportation will be set for 1600 pending insurance auth. Maron Klinefelter verbalized an understanding and is in agreement w/ the d/c plan. Transition of care to SNF: [facility]     Communication to Patient/Family:  Met with patient and family and they are agreeable to the transition plan. The Plan for Transition of Care is related to the following treatment goals: skilled nursing and d/c home. The Patient and/or patient representative dtr; Maron Klinefelter was provided with a choice of provider and agrees   with the discharge plan. Yes [x] No []    Freedom of choice list was provided with basic dialogue that supports the patient's individualized plan of care/goals and shares the quality data associated with the providers. Yes [x] No []    SNF/Rehab Transition:  Patient has been accepted to [address] at [address] [place] [address] for SNF/Rehab and meets criteria for admission. Patient will transported by [facility] and expected to leave at 1600. .    Communication to SNF/Rehab:  Bedside RN, Vicki Richardson, has been notified to update the transition plan to the facility and call report [phone]. Discharge information has been updated on the AVS and communicated via [facility] and/or CC link. Discharge instructions to be fax'd to facility at [phone] per request.     Please include all hard scripts for controlled substances, med rec and dc summary in packet. Please medicate for pain prior to dc if possible and needed to help offset delay when patient first arrives to facility. Reviewed and confirmed with facility, [facility] can manage the patient care needs for the following:     Noam Gupta with (X) only those applicable:  Medication:  []Medications are available at the facility  []IV Antibiotics    []Controlled Substance - hard copies available sent. []Weekly Labs    Equipment:  []CPAP/BiPAP  []Wound Vacuum  []Mcmillan or Urinary Device  []PICC/Central Line  []Nebulizer  []Ventilator    Treatment:  []Isolation (for MRSA, VRE, etc.)  []Surgical Drain Management  []Tracheostomy Care  []Dressing Changes  []Dialysis with transportation  []PEG Care  []Oxygen  []Daily Weights for Heart Failure    Dietary:  []Any diet limitations  []Tube Feedings   []Total Parenteral Management (TPN)    Financial Resources:  []Medicaid Application Completed    []UAI Completed  and copy given to pt/family    []A screening has previously been completed. []Level II Completed    [] Private pay individual who will not become   financially eligible for Medicaid within 6 months from admission to a [address] [place] [address]. [] Individual refused to have screening conducted. []Medicaid Application Completed    []The screening denied because it was determined individual did not need/did not qualify for nursing facility level of care. [] Out of state residents seeking direct admission to a [address] facility.   [] Individuals who are inpatients of an out of state hospital, or in state or out of state veterans/ hospital and seek direct admission to a [address] facility  [] Individuals who are pateints or residents of a state owned/operated facility that is licensed by Department of [employer] ([employer]) and seek direct admission to [address] [place] [address]  [] A screening not required for enrollment in [facility] services as set out in [facility]-  [] [facility]) staff shall perform screenings of the [facility] clients. Advanced Care Plan:  []Surrogate Decision Maker of Care  []POA  []Communicated Code Status and copy sent. Other:         Care Management Interventions  PCP Verified by CM:  Yes  Mode of Transport at Discharge: BLS  Transition of Care Consult (CM Consult): SNF (.)  Partner SNF: Yes  Discharge Durable Medical Equipment: No (Pt has a RW.)  Health Maintenance Reviewed: Yes  Physical Therapy Consult: Yes  Occupational Therapy Consult: Yes  Support Systems: Child(ana), Assisted Living  Confirm Follow Up Transport: Family  The Plan for Transition of Care is Related to the Following Treatment Goals : SNF  The Patient and/or Patient Representative was Provided with a Choice of Provider and Agrees with the Discharge Plan?: Yes  Freedom of Choice List was Provided with Basic Dialogue that Supports the Patient's Individualized Plan of Care/Goals, Treatment Preferences and Shares the Quality Data Associated with the Providers?:  ([facility] [place] )  Discharge Location  Patient Expects to be Discharged to[de-identified] Skilled nursing facility

## 2023-01-11 LAB
ANION GAP SERPL CALC-SCNC: 3 MMOL/L (ref 3–18)
BUN SERPL-MCNC: 22 MG/DL (ref 7–18)
BUN/CREAT SERPL: 22 (ref 12–20)
CALCIUM SERPL-MCNC: 9 MG/DL (ref 8.5–10.1)
CHLORIDE SERPL-SCNC: 101 MMOL/L (ref 100–111)
CO2 SERPL-SCNC: 30 MMOL/L (ref 21–32)
CREAT SERPL-MCNC: 0.98 MG/DL (ref 0.6–1.3)
GLUCOSE SERPL-MCNC: 93 MG/DL (ref 74–99)
MAGNESIUM SERPL-MCNC: 2.2 MG/DL (ref 1.6–2.6)
POTASSIUM SERPL-SCNC: 5.4 MMOL/L (ref 3.5–5.5)
SODIUM SERPL-SCNC: 134 MMOL/L (ref 136–145)

## 2023-01-11 PROCEDURE — 74011250637 HC RX REV CODE- 250/637: Performed by: INTERNAL MEDICINE

## 2023-01-11 PROCEDURE — 80048 BASIC METABOLIC PNL TOTAL CA: CPT

## 2023-01-11 PROCEDURE — 36415 COLL VENOUS BLD VENIPUNCTURE: CPT

## 2023-01-11 PROCEDURE — 83735 ASSAY OF MAGNESIUM: CPT

## 2023-01-11 PROCEDURE — 74011250637 HC RX REV CODE- 250/637: Performed by: HOSPITALIST

## 2023-01-11 PROCEDURE — 74011250637 HC RX REV CODE- 250/637: Performed by: FAMILY MEDICINE

## 2023-01-11 PROCEDURE — 65270000046 HC RM TELEMETRY

## 2023-01-11 PROCEDURE — 74011000250 HC RX REV CODE- 250: Performed by: FAMILY MEDICINE

## 2023-01-11 RX ORDER — METOPROLOL TARTRATE 25 MG/1
25 TABLET, FILM COATED ORAL EVERY 12 HOURS
Qty: 60 TABLET | Refills: 1 | Status: SHIPPED | OUTPATIENT
Start: 2023-01-11

## 2023-01-11 RX ORDER — FUROSEMIDE 40 MG/1
40 TABLET ORAL DAILY
Qty: 30 TABLET | Refills: 1 | Status: SHIPPED | OUTPATIENT
Start: 2023-01-11

## 2023-01-11 RX ORDER — CYANOCOBALAMIN (VITAMIN B-12) 250 MCG
250 TABLET ORAL DAILY
Qty: 30 TABLET | Refills: 1 | Status: SHIPPED | OUTPATIENT
Start: 2023-01-11

## 2023-01-11 RX ADMIN — PANTOPRAZOLE SODIUM 40 MG: 40 TABLET, DELAYED RELEASE ORAL at 06:10

## 2023-01-11 RX ADMIN — ACETAMINOPHEN 650 MG: 325 TABLET ORAL at 22:07

## 2023-01-11 RX ADMIN — LEVOTHYROXINE SODIUM 100 MCG: 0.1 TABLET ORAL at 06:11

## 2023-01-11 RX ADMIN — FUROSEMIDE 40 MG: 40 TABLET ORAL at 10:40

## 2023-01-11 RX ADMIN — GUAIFENESIN 1200 MG: 600 TABLET, EXTENDED RELEASE ORAL at 21:58

## 2023-01-11 RX ADMIN — METOPROLOL TARTRATE 25 MG: 25 TABLET, FILM COATED ORAL at 10:40

## 2023-01-11 RX ADMIN — Medication 1 TABLET: at 10:41

## 2023-01-11 RX ADMIN — SODIUM CHLORIDE, PRESERVATIVE FREE 10 ML: 5 INJECTION INTRAVENOUS at 22:01

## 2023-01-11 RX ADMIN — METOPROLOL TARTRATE 25 MG: 25 TABLET, FILM COATED ORAL at 21:58

## 2023-01-11 RX ADMIN — CYANOCOBALAMIN TAB 500 MCG 250 MCG: 500 TAB at 10:41

## 2023-01-11 RX ADMIN — GUAIFENESIN 1200 MG: 600 TABLET, EXTENDED RELEASE ORAL at 10:40

## 2023-01-11 NOTE — DISCHARGE SUMMARY
[facility] SUMMARY    Name:  Mariana Stevenson  MR#:   [id number]  :  1927  ACCOUNT #:  [de-identified]  ADMIT DATE:  2023  DISCHARGE DATE:  2023      DISCHARGE DIAGNOSES:  1. Acute-on-chronic diastolic congestive heart failure. 2.  New-onset atrial fibrillation. 3.  Severe aortic stenosis. 4.  Hypokalemia. 5.  Hypertension. 6.  Hypothyroidism. HOSPITAL COURSE:  The original discharge summary was dictated yesterday. There have been no clinical changes since that discharge summary was completed. Code status is do not resuscitate. Diet is as noted and continue the same medications as dictated already. The patient was expected to possibly go to a skilled nursing facility. However, she has apparently passed physical therapy, such that the [id number] call today denied her a skilled nursing stay. With that, she would discharge home with Home Health, physical therapy, and home health nursing today. On exam today, she is sitting up in the chair, dressed in her own pajamas, in no acute distress. Her blood pressure is 126/74, pulse 81, temperature 97.5, respiratory rate 16, SaO2 99% on room air. Lungs are clear. Cardiac exam, systolic ejection murmur II-III/VI, regular rhythm. Abdomen benign. Lower extremities, no pitting edema. She has no complaints of chest pain or shortness of breath. Her sodium is 134, creatinine 0.98, potassium 5.4, glucose 93. COVID rapid test yesterday was negative. The patient is stable for release to home with home health nursing. She has a code status of do not resuscitate. Her primary care doctor is Dr. Anitha Dowling. Total time on discharge 1 hour. She should continue to monitor fluid at home and avoid overload with fluid, 1500 mL fluid restriction would be appropriate.       Eyal Jaime MD      [id number]  D:  2023 9:41  T:  2023 12:02  JOB #:  [id number]

## 2023-01-11 NOTE — PROGRESS NOTES
D/c plan: return to [facility] w/ [place] w/ [place].     1354: CM informed by Dot Sears that an emergency has come up at [facility] and she is unable to come assess the pt for return until 01/12/23 at 0930. Pt can't d/c until she has been assessed. Pt's dtr; Long Knutson, bedside RN; Nany Bush are aware. CM left a VM for Orly on her cell phone; [phone] to have her come assess the pt for return to [facility].

## 2023-01-11 NOTE — PROGRESS NOTES
Cardiology Progress Note        Patient: Kymberly Sebastian        Sex: female          DOA: 1/2/2023  YOB: 1927      Age:  80 y.o.        LOS:  LOS: 9 days   Assessment/Plan     Principal Problem:    Acute exacerbation of CHF (congestive heart failure) ([facility] (1/2/2023)    Active Problems:    HTN (hypertension) (1/2/2023)      Hypothyroidism (1/2/2023)      New onset atrial fibrillation ([facility] (1/2/2023)      Aortic stenosis (1/2/2023)      Hypomagnesemia (1/2/2023)      Plan:  1/11/2023  Overall feeling better  Patient is waiting for transportation  Patient is discharged  Continue current meds as per discharge summary  No chest pain  Shortness of breath is better  Cardiac telemetry is stable  Tolerating metoprolol  Continue with p.o. Lasix  Discussed with patient. Patient is in the process of being discharged        1/6/2022  Sitting on the side of the bed  No chest pain  Tolerating beta-blocker  Continue with metoprolol tartrate 25 mg twice daily  Continue with Lasix  Patient and daughter does not want therapeutic anticoagulation for paroxysmal atrial fibrillation due to history of recurrent fall/advanced age  Please call on-call cardiologist if any questions or concerns during the weekend  Discussed with the patient and daughter    Cardiac telemetry with PVCs  Patient is complaining of cough on and off with minimal sputum  DC valsartan  Increase metoprolol titrate from 12.5 mg to 25 mg twice daily  Discussed with patient. I have also tried to call her daughter again today unable to get connected. Discussed with patient and daughter in the room  Daughter at this point agreed not to start therapeutic anticoagulation  Continue with current dose of metoprolol tartrate 25 mg twice daily  Continue with diuretics Lasix                  Subjective:    cc:  Shortness of breath        REVIEW OF SYSTEMS:     General: No fevers or chills.   Cardiovascular: No chest pain or pressure. No palpitations. No ankle swelling  Pulmonary: No SOB, orthopnea, PND  Gastrointestinal: No nausea, vomiting or diarrhea      Objective:      Visit Vitals  BP (!) 112/57   Pulse 68   Temp 97.5 °F (36.4 °C)   Resp 16   Ht 5' 1\" (1.549 m)   Wt 67.9 kg (149 lb 11.1 oz)   SpO2 94%   BMI 28.28 kg/m²     Body mass index is 28.28 kg/m². Physical Exam:  General Appearance: Comfortable, not using accessory muscles of respiration. NECK: No JVD, no thyroidomeglay. LUNGS: Clear bilaterally. HEART: S1+S2 audible, grade 3 ejection systolic murmur at aortic area    ABD: Non-tender, BS Audible    EXT: No edema, and no cysnosis. VASCULAR EXAM: Pulses are intact. PSYCHIATRIC EXAM: Mood is appropriate.     Medication:  Current Facility-Administered Medications   Medication Dose Route Frequency    potassium bicarb-citric acid (EFFER-K) tablet 20 mEq  20 mEq Oral DAILY    enoxaparin (LOVENOX) injection 70 mg  1 mg/kg SubCUTAneous Q24H    furosemide (LASIX) tablet 40 mg  40 mg Oral DAILY    guaiFENesin ER (MUCINEX) tablet 1,200 mg  1,200 mg Oral BID    metoprolol tartrate (LOPRESSOR) tablet 25 mg  25 mg Oral Q12H    levothyroxine (SYNTHROID) tablet 100 mcg  100 mcg Oral ACB    pantoprazole (PROTONIX) tablet 40 mg  40 mg Oral ACB    calcium-vitamin D (OS-NOÉ +D3) 500 mg-200 unit per tablet 1 Tablet  1 Tablet Oral DAILY    cyanocobalamin (VITAMIN B12) tablet 250 mcg  250 mcg Oral DAILY    benzonatate (TESSALON) capsule 100 mg  100 mg Oral TID PRN    sodium chloride (NS) flush 5-40 mL  5-40 mL IntraVENous Q8H    sodium chloride (NS) flush 5-40 mL  5-40 mL IntraVENous PRN    acetaminophen (TYLENOL) tablet 650 mg  650 mg Oral Q6H PRN    Or    acetaminophen (TYLENOL) suppository 650 mg  650 mg Rectal Q6H PRN    polyethylene glycol (MIRALAX) packet 17 g  17 g Oral DAILY PRN    ondansetron (ZOFRAN ODT) tablet 4 mg  4 mg Oral Q8H PRN    Or    ondansetron (ZOFRAN) injection 4 mg  4 mg IntraVENous Q6H PRN               Lab/Data Reviewed:  Procedures/imaging: see electronic medical records for all procedures/Xrays   and details which were not copied into this note but were reviewed prior to creation of Plan       All lab results for the last 24 hours reviewed.      Recent Labs     01/09/23  0150   WBC 7.2   HGB 12.2   HCT 36.7          Recent Labs     01/11/23  0450 01/10/23  0120 01/09/23  0150   * 134* 131*   K 5.4 4.4 3.2*    97* 94*   CO2 30 29 29   GLU 93 96 105*   BUN 22* 27* 22*   CREA 0.98 1.00 0.95   CA 9.0 8.7 8.8         RADIOLOGY:  CT Results  (Last 48 hours)      None          CXR Results  (Last 48 hours)      None              Cardiology Procedures:   Results for orders placed or performed during the hospital encounter of 01/02/23   EKG, 12 LEAD, INITIAL   Result Value Ref Range    Ventricular Rate 97 BPM    QRS Duration 110 ms    Q-T Interval 394 ms    QTC Calculation (Bezet) 500 ms    Calculated R Axis 89 degrees    Calculated T Axis -92 degrees    Diagnosis       Atrial fibrillation with a competing junctional pacemaker with premature   ventricular or aberrantly conducted complexes  Septal infarct , age undetermined  Marked ST abnormality, possible inferolateral subendocardial injury  Abnormal ECG  When compared with ECG of 22-MAR-2022 13:25,  Atrial fibrillation has replaced Sinus rhythm  Right bundle branch block is no longer present  Septal infarct is now present  Confirmed by Mirella Carmona MD. ([id number]) on 1/3/2023 10:13:42 PM        Echo Results  (Last 48 hours)      None         Cardiolite (Tc-99m Sestamibi) stress test    Signed By: Douglas Ceja MD     January 11, 2023

## 2023-01-11 NOTE — PROGRESS NOTES
1900 - 0730: Patient remained medically stable throughout shift. No significant clinical changes noted. Bedside and Verbal shift change report given to [address] (oncoming nurse) by Cuca Rodriguez (offgoing nurse). Report included the following information SBAR, Kardex, and MAR.

## 2023-01-11 NOTE — PROGRESS NOTES
Called peer to peer this am for request for SNF placement    As suspected the [facility] physician feels there is no indication to approve SNF at this time based on PT record and patient needs    Will d/c home with HHN/PT

## 2023-01-11 NOTE — PROGRESS NOTES
Problem: Falls - Risk of  Goal: *Absence of Falls  Description: Document Natalie Skill Fall Risk and appropriate interventions in the flowsheet.   Outcome: Progressing Towards Goal  Note: Fall Risk Interventions:  Mobility Interventions: Bed/chair exit alarm, Communicate number of staff needed for ambulation/transfer, Patient to call before getting OOB, Utilize walker, cane, or other assistive device         Medication Interventions: Bed/chair exit alarm, Evaluate medications/consider consulting pharmacy, Patient to call before getting OOB    Elimination Interventions: Bed/chair exit alarm, Call light in reach    History of Falls Interventions: Bed/chair exit alarm, Evaluate medications/consider consulting pharmacy, Investigate reason for fall         Problem: Patient Education: Go to Patient Education Activity  Goal: Patient/Family Education  Outcome: Progressing Towards Goal     Problem: Pain  Goal: *Control of Pain  Outcome: Progressing Towards Goal     Problem: Patient Education: Go to Patient Education Activity  Goal: Patient/Family Education  Outcome: Progressing Towards Goal     Problem: Patient Education: Go to Patient Education Activity  Goal: Patient/Family Education  Outcome: Progressing Towards Goal     Problem: Heart Failure: Day 1  Goal: Off Pathway (Use only if patient is Off Pathway)  Outcome: Progressing Towards Goal  Goal: Activity/Safety  Outcome: Progressing Towards Goal  Goal: Consults, if ordered  Outcome: Progressing Towards Goal  Goal: Diagnostic Test/Procedures  Outcome: Progressing Towards Goal  Goal: Nutrition/Diet  Outcome: Progressing Towards Goal  Goal: Discharge Planning  Outcome: Progressing Towards Goal  Goal: Medications  Outcome: Progressing Towards Goal  Goal: Respiratory  Outcome: Progressing Towards Goal  Goal: Treatments/Interventions/Procedures  Outcome: Progressing Towards Goal  Goal: Psychosocial  Outcome: Progressing Towards Goal  Goal: *Oxygen saturation within defined limits  Outcome: Progressing Towards Goal  Goal: *Hemodynamically stable  Outcome: Progressing Towards Goal  Goal: *Optimal pain control at patient's stated goal  Outcome: Progressing Towards Goal  Goal: *Anxiety reduced or absent  Outcome: Progressing Towards Goal     Problem: Heart Failure: Day 2  Goal: Off Pathway (Use only if patient is Off Pathway)  Outcome: Progressing Towards Goal  Goal: Activity/Safety  Outcome: Progressing Towards Goal  Goal: Consults, if ordered  Outcome: Progressing Towards Goal  Goal: Diagnostic Test/Procedures  Outcome: Progressing Towards Goal  Goal: Nutrition/Diet  Outcome: Progressing Towards Goal  Goal: Discharge Planning  Outcome: Progressing Towards Goal  Goal: Medications  Outcome: Progressing Towards Goal  Goal: Respiratory  Outcome: Progressing Towards Goal  Goal: Treatments/Interventions/Procedures  Outcome: Progressing Towards Goal  Goal: Psychosocial  Outcome: Progressing Towards Goal  Goal: *Oxygen saturation within defined limits  Outcome: Progressing Towards Goal  Goal: *Hemodynamically stable  Outcome: Progressing Towards Goal  Goal: *Optimal pain control at patient's stated goal  Outcome: Progressing Towards Goal  Goal: *Anxiety reduced or absent  Outcome: Progressing Towards Goal  Goal: *Demonstrates progressive activity  Outcome: Progressing Towards Goal     Problem: Heart Failure: Day 3  Goal: Off Pathway (Use only if patient is Off Pathway)  Outcome: Progressing Towards Goal  Goal: Activity/Safety  Outcome: Progressing Towards Goal  Goal: Diagnostic Test/Procedures  Outcome: Progressing Towards Goal  Goal: Nutrition/Diet  Outcome: Progressing Towards Goal  Goal: Discharge Planning  Outcome: Progressing Towards Goal  Goal: Medications  Outcome: Progressing Towards Goal  Goal: Respiratory  Outcome: Progressing Towards Goal  Goal: Treatments/Interventions/Procedures  Outcome: Progressing Towards Goal  Goal: Psychosocial  Outcome: Progressing Towards Goal  Goal: *Oxygen saturation within defined limits  Outcome: Progressing Towards Goal  Goal: *Hemodynamically stable  Outcome: Progressing Towards Goal  Goal: *Optimal pain control at patient's stated goal  Outcome: Progressing Towards Goal  Goal: *Anxiety reduced or absent  Outcome: Progressing Towards Goal  Goal: *Demonstrates progressive activity  Outcome: Progressing Towards Goal     Problem: Heart Failure: Day 4  Goal: Off Pathway (Use only if patient is Off Pathway)  Outcome: Progressing Towards Goal  Goal: Activity/Safety  Outcome: Progressing Towards Goal  Goal: Diagnostic Test/Procedures  Outcome: Progressing Towards Goal  Goal: Nutrition/Diet  Outcome: Progressing Towards Goal  Goal: Discharge Planning  Outcome: Progressing Towards Goal  Goal: Medications  Outcome: Progressing Towards Goal  Goal: Respiratory  Outcome: Progressing Towards Goal  Goal: Treatments/Interventions/Procedures  Outcome: Progressing Towards Goal  Goal: Psychosocial  Outcome: Progressing Towards Goal  Goal: *Oxygen saturation within defined limits  Outcome: Progressing Towards Goal  Goal: *Hemodynamically stable  Outcome: Progressing Towards Goal  Goal: *Optimal pain control at patient's stated goal  Outcome: Progressing Towards Goal  Goal: *Anxiety reduced or absent  Outcome: Progressing Towards Goal  Goal: *Demonstrates progressive activity  Outcome: Progressing Towards Goal     Problem: Heart Failure: Day 5  Goal: Off Pathway (Use only if patient is Off Pathway)  Outcome: Progressing Towards Goal  Goal: Activity/Safety  Outcome: Progressing Towards Goal  Goal: Diagnostic Test/Procedures  Outcome: Progressing Towards Goal  Goal: Nutrition/Diet  Outcome: Progressing Towards Goal  Goal: Discharge Planning  Outcome: Progressing Towards Goal  Goal: Medications  Outcome: Progressing Towards Goal  Goal: Respiratory  Outcome: Progressing Towards Goal  Goal: Treatments/Interventions/Procedures  Outcome: Progressing Towards Goal  Goal: Psychosocial  Outcome: Progressing Towards Goal     Problem: Heart Failure: Discharge Outcomes  Goal: *Demonstrates ability to perform prescribed activity without shortness of breath or discomfort  Outcome: Progressing Towards Goal  Goal: *Left ventricular function assessment completed prior to or during stay, or planned for post-discharge  Outcome: Progressing Towards Goal  Goal: *ACEI prescribed if LVEF less than 40% and no contraindications or ARB prescribed  Outcome: Progressing Towards Goal  Goal: *Verbalizes understanding and describes prescribed diet  Outcome: Progressing Towards Goal  Goal: *Verbalizes understanding/describes prescribed medications  Outcome: Progressing Towards Goal  Goal: *Describes available resources and support systems  Description: (eg: Home Health, Palliative Care, Advanced Medical Directive)  Outcome: Progressing Towards Goal  Goal: *Describes smoking cessation resources  Outcome: Progressing Towards Goal  Goal: *Understands and describes signs and symptoms to report to providers(Stroke Metric)  Outcome: Progressing Towards Goal  Goal: *Describes/verbalizes understanding of follow-up/return appt  Description: (eg: to physicians, diabetes treatment coordinator, and other resources  Outcome: Progressing Towards Goal  Goal: *Describes importance of continuing daily weights and changes to report to physician  Outcome: Progressing Towards Goal     Problem: Pressure Injury - Risk of  Goal: *Prevention of pressure injury  Description: Document Iggy Scale and appropriate interventions in the flowsheet.   Outcome: Progressing Towards Goal  Note: Pressure Injury Interventions:  Sensory Interventions: Assess changes in LOC, Avoid rigorous massage over bony prominences, Check visual cues for pain, Float heels, Keep linens dry and wrinkle-free, Maintain/enhance activity level, Monitor skin under medical devices    Moisture Interventions: Absorbent underpads, Apply protective barrier, creams and emollients, Check for incontinence Q2 hours and as needed, Internal/External urinary devices, Maintain skin hydration (lotion/cream)    Activity Interventions: Pressure redistribution bed/mattress(bed type)    Mobility Interventions: Pressure redistribution bed/mattress (bed type), Turn and reposition approx.  every two hours(pillow and wedges)    Nutrition Interventions: Document food/fluid/supplement intake, Discuss nutritional consult with provider, Offer support with meals,snacks and hydration    Friction and Shear Interventions: Apply protective barrier, creams and emollients, Feet elevated on foot rest                Problem: Patient Education: Go to Patient Education Activity  Goal: Patient/Family Education  Outcome: Progressing Towards Goal     Problem: Patient Education: Go to Patient Education Activity  Goal: Patient/Family Education  Outcome: Progressing Towards Goal     Problem: Patient Education: Go to Patient Education Activity  Goal: Patient/Family Education  Outcome: Progressing Towards Goal

## 2023-01-11 NOTE — PROGRESS NOTES
Problem: Falls - Risk of  Goal: *Absence of Falls  Description: Document Brandon Gallagher Fall Risk and appropriate interventions in the flowsheet.   Outcome: Progressing Towards Goal  Note: Fall Risk Interventions:  Mobility Interventions: Bed/chair exit alarm, Communicate number of staff needed for ambulation/transfer, Patient to call before getting OOB, Utilize walker, cane, or other assistive device         Medication Interventions: Bed/chair exit alarm, Evaluate medications/consider consulting pharmacy, Patient to call before getting OOB    Elimination Interventions: Bed/chair exit alarm, Call light in reach    History of Falls Interventions: Bed/chair exit alarm, Evaluate medications/consider consulting pharmacy, Investigate reason for fall         Problem: Patient Education: Go to Patient Education Activity  Goal: Patient/Family Education  Outcome: Progressing Towards Goal     Problem: Pain  Goal: *Control of Pain  Outcome: Progressing Towards Goal     Problem: Patient Education: Go to Patient Education Activity  Goal: Patient/Family Education  Outcome: Progressing Towards Goal     Problem: Patient Education: Go to Patient Education Activity  Goal: Patient/Family Education  Outcome: Progressing Towards Goal     Problem: Heart Failure: Day 1  Goal: Off Pathway (Use only if patient is Off Pathway)  Outcome: Progressing Towards Goal  Goal: Activity/Safety  Outcome: Progressing Towards Goal  Goal: Consults, if ordered  Outcome: Progressing Towards Goal  Goal: Diagnostic Test/Procedures  Outcome: Progressing Towards Goal  Goal: Nutrition/Diet  Outcome: Progressing Towards Goal  Goal: Discharge Planning  Outcome: Progressing Towards Goal  Goal: Medications  Outcome: Progressing Towards Goal  Goal: Respiratory  Outcome: Progressing Towards Goal  Goal: Treatments/Interventions/Procedures  Outcome: Progressing Towards Goal  Goal: Psychosocial  Outcome: Progressing Towards Goal  Goal: *Oxygen saturation within defined limits  Outcome: Progressing Towards Goal  Goal: *Hemodynamically stable  Outcome: Progressing Towards Goal  Goal: *Optimal pain control at patient's stated goal  Outcome: Progressing Towards Goal  Goal: *Anxiety reduced or absent  Outcome: Progressing Towards Goal     Problem: Heart Failure: Day 2  Goal: Off Pathway (Use only if patient is Off Pathway)  Outcome: Progressing Towards Goal  Goal: Activity/Safety  Outcome: Progressing Towards Goal  Goal: Consults, if ordered  Outcome: Progressing Towards Goal  Goal: Diagnostic Test/Procedures  Outcome: Progressing Towards Goal  Goal: Nutrition/Diet  Outcome: Progressing Towards Goal  Goal: Discharge Planning  Outcome: Progressing Towards Goal  Goal: Medications  Outcome: Progressing Towards Goal  Goal: Respiratory  Outcome: Progressing Towards Goal  Goal: Treatments/Interventions/Procedures  Outcome: Progressing Towards Goal  Goal: Psychosocial  Outcome: Progressing Towards Goal  Goal: *Oxygen saturation within defined limits  Outcome: Progressing Towards Goal  Goal: *Hemodynamically stable  Outcome: Progressing Towards Goal  Goal: *Optimal pain control at patient's stated goal  Outcome: Progressing Towards Goal  Goal: *Anxiety reduced or absent  Outcome: Progressing Towards Goal  Goal: *Demonstrates progressive activity  Outcome: Progressing Towards Goal     Problem: Heart Failure: Day 3  Goal: Off Pathway (Use only if patient is Off Pathway)  Outcome: Progressing Towards Goal  Goal: Activity/Safety  Outcome: Progressing Towards Goal  Goal: Diagnostic Test/Procedures  Outcome: Progressing Towards Goal  Goal: Nutrition/Diet  Outcome: Progressing Towards Goal  Goal: Discharge Planning  Outcome: Progressing Towards Goal  Goal: Medications  Outcome: Progressing Towards Goal  Goal: Respiratory  Outcome: Progressing Towards Goal  Goal: Treatments/Interventions/Procedures  Outcome: Progressing Towards Goal  Goal: Psychosocial  Outcome: Progressing Towards Goal  Goal: *Oxygen saturation within defined limits  Outcome: Progressing Towards Goal  Goal: *Hemodynamically stable  Outcome: Progressing Towards Goal  Goal: *Optimal pain control at patient's stated goal  Outcome: Progressing Towards Goal  Goal: *Anxiety reduced or absent  Outcome: Progressing Towards Goal  Goal: *Demonstrates progressive activity  Outcome: Progressing Towards Goal     Problem: Heart Failure: Day 4  Goal: Off Pathway (Use only if patient is Off Pathway)  Outcome: Progressing Towards Goal  Goal: Activity/Safety  Outcome: Progressing Towards Goal  Goal: Diagnostic Test/Procedures  Outcome: Progressing Towards Goal  Goal: Nutrition/Diet  Outcome: Progressing Towards Goal  Goal: Discharge Planning  Outcome: Progressing Towards Goal  Goal: Medications  Outcome: Progressing Towards Goal  Goal: Respiratory  Outcome: Progressing Towards Goal  Goal: Treatments/Interventions/Procedures  Outcome: Progressing Towards Goal  Goal: Psychosocial  Outcome: Progressing Towards Goal  Goal: *Oxygen saturation within defined limits  Outcome: Progressing Towards Goal  Goal: *Hemodynamically stable  Outcome: Progressing Towards Goal  Goal: *Optimal pain control at patient's stated goal  Outcome: Progressing Towards Goal  Goal: *Anxiety reduced or absent  Outcome: Progressing Towards Goal  Goal: *Demonstrates progressive activity  Outcome: Progressing Towards Goal     Problem: Heart Failure: Day 5  Goal: Off Pathway (Use only if patient is Off Pathway)  Outcome: Progressing Towards Goal  Goal: Activity/Safety  Outcome: Progressing Towards Goal  Goal: Diagnostic Test/Procedures  Outcome: Progressing Towards Goal  Goal: Nutrition/Diet  Outcome: Progressing Towards Goal  Goal: Discharge Planning  Outcome: Progressing Towards Goal  Goal: Medications  Outcome: Progressing Towards Goal  Goal: Respiratory  Outcome: Progressing Towards Goal  Goal: Treatments/Interventions/Procedures  Outcome: Progressing Towards Goal  Goal: Psychosocial  Outcome: Progressing Towards Goal

## 2023-01-12 VITALS
SYSTOLIC BLOOD PRESSURE: 132 MMHG | RESPIRATION RATE: 16 BRPM | HEART RATE: 73 BPM | BODY MASS INDEX: 28.26 KG/M2 | DIASTOLIC BLOOD PRESSURE: 79 MMHG | TEMPERATURE: 98.1 F | WEIGHT: 149.69 LBS | HEIGHT: 61 IN | OXYGEN SATURATION: 100 %

## 2023-01-12 LAB
ALBUMIN SERPL-MCNC: 3 G/DL (ref 3.4–5)
ALBUMIN/GLOB SERPL: 1.2 (ref 0.8–1.7)
ALP SERPL-CCNC: 79 U/L (ref 45–117)
ALT SERPL-CCNC: 17 U/L (ref 13–56)
ANION GAP SERPL CALC-SCNC: 8 MMOL/L (ref 3–18)
AST SERPL-CCNC: 21 U/L (ref 10–38)
BASOPHILS # BLD: 0 K/UL (ref 0–0.1)
BASOPHILS NFR BLD: 1 % (ref 0–2)
BILIRUB SERPL-MCNC: 0.4 MG/DL (ref 0.2–1)
BUN SERPL-MCNC: 22 MG/DL (ref 7–18)
BUN/CREAT SERPL: 27 (ref 12–20)
CALCIUM SERPL-MCNC: 8.6 MG/DL (ref 8.5–10.1)
CHLORIDE SERPL-SCNC: 100 MMOL/L (ref 100–111)
CO2 SERPL-SCNC: 27 MMOL/L (ref 21–32)
CREAT SERPL-MCNC: 0.82 MG/DL (ref 0.6–1.3)
DIFFERENTIAL METHOD BLD: ABNORMAL
EOSINOPHIL # BLD: 0.5 K/UL (ref 0–0.4)
EOSINOPHIL NFR BLD: 8 % (ref 0–5)
ERYTHROCYTE [DISTWIDTH] IN BLOOD BY AUTOMATED COUNT: 13.1 % (ref 11.6–14.5)
GLOBULIN SER CALC-MCNC: 2.5 G/DL (ref 2–4)
GLUCOSE SERPL-MCNC: 94 MG/DL (ref 74–99)
HCT VFR BLD AUTO: 36.4 % (ref 35–45)
HGB BLD-MCNC: 11.4 G/DL (ref 12–16)
IMM GRANULOCYTES # BLD AUTO: 0.1 K/UL (ref 0–0.04)
IMM GRANULOCYTES NFR BLD AUTO: 1 % (ref 0–0.5)
LYMPHOCYTES # BLD: 1.4 K/UL (ref 0.9–3.6)
LYMPHOCYTES NFR BLD: 22 % (ref 21–52)
MAGNESIUM SERPL-MCNC: 1.8 MG/DL (ref 1.6–2.6)
MAGNESIUM SERPL-MCNC: 1.9 MG/DL (ref 1.6–2.6)
MCH RBC QN AUTO: 31.4 PG (ref 24–34)
MCHC RBC AUTO-ENTMCNC: 31.3 G/DL (ref 31–37)
MCV RBC AUTO: 100.3 FL (ref 78–100)
MONOCYTES # BLD: 0.8 K/UL (ref 0.05–1.2)
MONOCYTES NFR BLD: 12 % (ref 3–10)
NEUTS SEG # BLD: 3.5 K/UL (ref 1.8–8)
NEUTS SEG NFR BLD: 56 % (ref 40–73)
NRBC # BLD: 0 K/UL (ref 0–0.01)
NRBC BLD-RTO: 0 PER 100 WBC
PLATELET # BLD AUTO: 285 K/UL (ref 135–420)
PMV BLD AUTO: 10.5 FL (ref 9.2–11.8)
POTASSIUM SERPL-SCNC: 4.2 MMOL/L (ref 3.5–5.5)
PROT SERPL-MCNC: 5.5 G/DL (ref 6.4–8.2)
RBC # BLD AUTO: 3.63 M/UL (ref 4.2–5.3)
SODIUM SERPL-SCNC: 135 MMOL/L (ref 136–145)
WBC # BLD AUTO: 6.2 K/UL (ref 4.6–13.2)

## 2023-01-12 PROCEDURE — 36415 COLL VENOUS BLD VENIPUNCTURE: CPT

## 2023-01-12 PROCEDURE — 74011250637 HC RX REV CODE- 250/637: Performed by: INTERNAL MEDICINE

## 2023-01-12 PROCEDURE — 80053 COMPREHEN METABOLIC PANEL: CPT

## 2023-01-12 PROCEDURE — 74011250637 HC RX REV CODE- 250/637: Performed by: FAMILY MEDICINE

## 2023-01-12 PROCEDURE — 74011250637 HC RX REV CODE- 250/637: Performed by: HOSPITALIST

## 2023-01-12 PROCEDURE — 85025 COMPLETE CBC W/AUTO DIFF WBC: CPT

## 2023-01-12 PROCEDURE — 74011000250 HC RX REV CODE- 250: Performed by: FAMILY MEDICINE

## 2023-01-12 PROCEDURE — 83735 ASSAY OF MAGNESIUM: CPT

## 2023-01-12 RX ADMIN — LEVOTHYROXINE SODIUM 100 MCG: 0.1 TABLET ORAL at 07:30

## 2023-01-12 RX ADMIN — FUROSEMIDE 40 MG: 40 TABLET ORAL at 10:07

## 2023-01-12 RX ADMIN — PANTOPRAZOLE SODIUM 40 MG: 40 TABLET, DELAYED RELEASE ORAL at 07:30

## 2023-01-12 RX ADMIN — SODIUM CHLORIDE, PRESERVATIVE FREE 10 ML: 5 INJECTION INTRAVENOUS at 06:13

## 2023-01-12 RX ADMIN — METOPROLOL TARTRATE 25 MG: 25 TABLET, FILM COATED ORAL at 10:07

## 2023-01-12 RX ADMIN — Medication 1 TABLET: at 10:07

## 2023-01-12 RX ADMIN — CYANOCOBALAMIN TAB 500 MCG 250 MCG: 500 TAB at 10:07

## 2023-01-12 RX ADMIN — GUAIFENESIN 1200 MG: 600 TABLET, EXTENDED RELEASE ORAL at 10:07

## 2023-01-12 NOTE — PROGRESS NOTES
Assisted Living facility representative, Orly, performed assessment for patient to return to facility, [facility] RN instructed to Fax PT orders and discharge order to [phone].  notified of above and will send requested information.

## 2023-01-12 NOTE — DISCHARGE INSTRUCTIONS
DISCHARGE SUMMARY from Nurse    PATIENT INSTRUCTIONS:    After general anesthesia or intravenous sedation, for 24 hours or while taking prescription Narcotics:  Limit your activities  Do not drive and operate hazardous machinery  Do not make important personal or business decisions  Do  not drink alcoholic beverages  If you have not urinated within 8 hours after discharge, please contact your surgeon on call. Report the following to your surgeon:  Excessive pain, swelling, redness or odor of or around the surgical area  Temperature over 100.5  Nausea and vomiting lasting longer than 4 hours or if unable to take medications  Any signs of decreased circulation or nerve impairment to extremity: change in color, persistent  numbness, tingling, coldness or increase pain  Any questions    What to do at Home:  Recommended activity: Activity as tolerated,     If you experience any of the following symptoms lower extremity swelling, heart racing or increased shortness of breath, please follow up with primary care physician. *  Please give a list of your current medications to your Primary Care Provider. *  Please update this list whenever your medications are discontinued, doses are      changed, or new medications (including over-the-counter products) are added. *  Please carry medication information at all times in case of emergency situations. These are general instructions for a healthy lifestyle:    No smoking/ No tobacco products/ Avoid exposure to second hand smoke  Surgeon General's Warning:  Quitting smoking now greatly reduces serious risk to your health.     Obesity, smoking, and sedentary lifestyle greatly increases your risk for illness    A healthy diet, regular physical exercise & weight monitoring are important for maintaining a healthy lifestyle    You may be retaining fluid if you have a history of heart failure or if you experience any of the following symptoms:  Weight gain of 3 pounds or more overnight or 5 pounds in a week, increased swelling in our hands or feet or shortness of breath while lying flat in bed. Please call your doctor as soon as you notice any of these symptoms; do not wait until your next office visit. Patient armband removed and shredded. The discharge information has been reviewed with the patient and caregiver. The patient and caregiver verbalized understanding. Discharge medications reviewed with the patient and caregiver and appropriate educational materials and side effects teaching were provided.   ___________________________________________________________________________________________________________________________________

## 2023-01-12 NOTE — PROGRESS NOTES
Problem: Falls - Risk of  Goal: *Absence of Falls  Description: Document Sunil Caballero Fall Risk and appropriate interventions in the flowsheet.   Outcome: Progressing Towards Goal  Note: Fall Risk Interventions:  Mobility Interventions: Bed/chair exit alarm, Communicate number of staff needed for ambulation/transfer, Patient to call before getting OOB, Utilize walker, cane, or other assistive device         Medication Interventions: Bed/chair exit alarm, Evaluate medications/consider consulting pharmacy, Patient to call before getting OOB    Elimination Interventions: Bed/chair exit alarm, Call light in reach    History of Falls Interventions: Bed/chair exit alarm, Evaluate medications/consider consulting pharmacy, Investigate reason for fall         Problem: Patient Education: Go to Patient Education Activity  Goal: Patient/Family Education  Outcome: Progressing Towards Goal     Problem: Pain  Goal: *Control of Pain  Outcome: Progressing Towards Goal     Problem: Patient Education: Go to Patient Education Activity  Goal: Patient/Family Education  Outcome: Progressing Towards Goal     Problem: Patient Education: Go to Patient Education Activity  Goal: Patient/Family Education  Outcome: Progressing Towards Goal     Problem: Heart Failure: Day 1  Goal: Off Pathway (Use only if patient is Off Pathway)  Outcome: Progressing Towards Goal  Goal: Activity/Safety  Outcome: Progressing Towards Goal  Goal: Consults, if ordered  Outcome: Progressing Towards Goal  Goal: Diagnostic Test/Procedures  Outcome: Progressing Towards Goal  Goal: Nutrition/Diet  Outcome: Progressing Towards Goal  Goal: Discharge Planning  Outcome: Progressing Towards Goal  Goal: Medications  Outcome: Progressing Towards Goal  Goal: Respiratory  Outcome: Progressing Towards Goal  Goal: Treatments/Interventions/Procedures  Outcome: Progressing Towards Goal  Goal: Psychosocial  Outcome: Progressing Towards Goal  Goal: *Oxygen saturation within defined limits  Outcome: Progressing Towards Goal  Goal: *Hemodynamically stable  Outcome: Progressing Towards Goal  Goal: *Optimal pain control at patient's stated goal  Outcome: Progressing Towards Goal  Goal: *Anxiety reduced or absent  Outcome: Progressing Towards Goal     Problem: Heart Failure: Day 2  Goal: Off Pathway (Use only if patient is Off Pathway)  Outcome: Progressing Towards Goal  Goal: Activity/Safety  Outcome: Progressing Towards Goal  Goal: Consults, if ordered  Outcome: Progressing Towards Goal  Goal: Diagnostic Test/Procedures  Outcome: Progressing Towards Goal  Goal: Nutrition/Diet  Outcome: Progressing Towards Goal  Goal: Discharge Planning  Outcome: Progressing Towards Goal  Goal: Medications  Outcome: Progressing Towards Goal  Goal: Respiratory  Outcome: Progressing Towards Goal  Goal: Treatments/Interventions/Procedures  Outcome: Progressing Towards Goal  Goal: Psychosocial  Outcome: Progressing Towards Goal  Goal: *Oxygen saturation within defined limits  Outcome: Progressing Towards Goal  Goal: *Hemodynamically stable  Outcome: Progressing Towards Goal  Goal: *Optimal pain control at patient's stated goal  Outcome: Progressing Towards Goal  Goal: *Anxiety reduced or absent  Outcome: Progressing Towards Goal  Goal: *Demonstrates progressive activity  Outcome: Progressing Towards Goal     Problem: Heart Failure: Day 3  Goal: Off Pathway (Use only if patient is Off Pathway)  Outcome: Progressing Towards Goal  Goal: Activity/Safety  Outcome: Progressing Towards Goal  Goal: Diagnostic Test/Procedures  Outcome: Progressing Towards Goal  Goal: Nutrition/Diet  Outcome: Progressing Towards Goal  Goal: Discharge Planning  Outcome: Progressing Towards Goal  Goal: Medications  Outcome: Progressing Towards Goal  Goal: Respiratory  Outcome: Progressing Towards Goal  Goal: Treatments/Interventions/Procedures  Outcome: Progressing Towards Goal  Goal: Psychosocial  Outcome: Progressing Towards Goal  Goal: *Oxygen saturation within defined limits  Outcome: Progressing Towards Goal  Goal: *Hemodynamically stable  Outcome: Progressing Towards Goal  Goal: *Optimal pain control at patient's stated goal  Outcome: Progressing Towards Goal  Goal: *Anxiety reduced or absent  Outcome: Progressing Towards Goal  Goal: *Demonstrates progressive activity  Outcome: Progressing Towards Goal     Problem: Heart Failure: Day 4  Goal: Off Pathway (Use only if patient is Off Pathway)  Outcome: Progressing Towards Goal  Goal: Activity/Safety  Outcome: Progressing Towards Goal  Goal: Diagnostic Test/Procedures  Outcome: Progressing Towards Goal  Goal: Nutrition/Diet  Outcome: Progressing Towards Goal  Goal: Discharge Planning  Outcome: Progressing Towards Goal  Goal: Medications  Outcome: Progressing Towards Goal  Goal: Respiratory  Outcome: Progressing Towards Goal  Goal: Treatments/Interventions/Procedures  Outcome: Progressing Towards Goal  Goal: Psychosocial  Outcome: Progressing Towards Goal  Goal: *Oxygen saturation within defined limits  Outcome: Progressing Towards Goal  Goal: *Hemodynamically stable  Outcome: Progressing Towards Goal  Goal: *Optimal pain control at patient's stated goal  Outcome: Progressing Towards Goal  Goal: *Anxiety reduced or absent  Outcome: Progressing Towards Goal  Goal: *Demonstrates progressive activity  Outcome: Progressing Towards Goal     Problem: Heart Failure: Day 5  Goal: Off Pathway (Use only if patient is Off Pathway)  Outcome: Progressing Towards Goal  Goal: Activity/Safety  Outcome: Progressing Towards Goal  Goal: Diagnostic Test/Procedures  Outcome: Progressing Towards Goal  Goal: Nutrition/Diet  Outcome: Progressing Towards Goal  Goal: Discharge Planning  Outcome: Progressing Towards Goal  Goal: Medications  Outcome: Progressing Towards Goal  Goal: Respiratory  Outcome: Progressing Towards Goal  Goal: Treatments/Interventions/Procedures  Outcome: Progressing Towards Goal  Goal: Psychosocial  Outcome: Progressing Towards Goal     Problem: Heart Failure: Discharge Outcomes  Goal: *Demonstrates ability to perform prescribed activity without shortness of breath or discomfort  Outcome: Progressing Towards Goal  Goal: *Left ventricular function assessment completed prior to or during stay, or planned for post-discharge  Outcome: Progressing Towards Goal  Goal: *ACEI prescribed if LVEF less than 40% and no contraindications or ARB prescribed  Outcome: Progressing Towards Goal  Goal: *Verbalizes understanding and describes prescribed diet  Outcome: Progressing Towards Goal  Goal: *Verbalizes understanding/describes prescribed medications  Outcome: Progressing Towards Goal  Goal: *Describes available resources and support systems  Description: (eg: Home Health, Palliative Care, [employer] Directive)  Outcome: Progressing Towards Goal  Goal: *Describes smoking cessation resources  Outcome: Progressing Towards Goal  Goal: *Understands and describes signs and symptoms to report to providers(Stroke Metric)  Outcome: Progressing Towards Goal  Goal: *Describes/verbalizes understanding of follow-up/return appt  Description: (eg: to physicians, diabetes treatment coordinator, and other resources  Outcome: Progressing Towards Goal  Goal: *Describes importance of continuing daily weights and changes to report to physician  Outcome: Progressing Towards Goal     Problem: Pressure Injury - Risk of  Goal: *Prevention of pressure injury  Description: Document Iggy Scale and appropriate interventions in the flowsheet.   Outcome: Progressing Towards Goal  Note: Pressure Injury Interventions:  Sensory Interventions: Assess changes in LOC, Avoid rigorous massage over bony prominences, Check visual cues for pain, Float heels, Keep linens dry and wrinkle-free, Maintain/enhance activity level, Monitor skin under medical devices    Moisture Interventions: Minimize layers, Absorbent underpads, Apply protective barrier, creams and emollients    Activity Interventions: Pressure redistribution bed/mattress(bed type)    Mobility Interventions: Pressure redistribution bed/mattress (bed type), Turn and reposition approx.  every two hours(pillow and wedges)    Nutrition Interventions: Document food/fluid/supplement intake, Discuss nutritional consult with provider, Offer support with meals,snacks and hydration    Friction and Shear Interventions: Apply protective barrier, creams and emollients, Feet elevated on foot rest                Problem: Patient Education: Go to Patient Education Activity  Goal: Patient/Family Education  Outcome: Progressing Towards Goal     Problem: Patient Education: Go to Patient Education Activity  Goal: Patient/Family Education  Outcome: Progressing Towards Goal     Problem: Patient Education: Go to Patient Education Activity  Goal: Patient/Family Education  Outcome: Progressing Towards Goal

## 2023-01-12 NOTE — PROGRESS NOTES
Problem: Falls - Risk of  Goal: *Absence of Falls  Description: Document Lettie Duverney Fall Risk and appropriate interventions in the flowsheet.   1/12/2023 1356 by Olena Baker RN  Outcome: Resolved/Met  Note: Fall Risk Interventions:  Mobility Interventions: Bed/chair exit alarm, Communicate number of staff needed for ambulation/transfer, Patient to call before getting OOB, Utilize walker, cane, or other assistive device         Medication Interventions: Bed/chair exit alarm, Evaluate medications/consider consulting pharmacy, Patient to call before getting OOB    Elimination Interventions: Bed/chair exit alarm, Call light in reach    History of Falls Interventions: Bed/chair exit alarm, Evaluate medications/consider consulting pharmacy, Investigate reason for fall      1/12/2023 1352 by Olena Baker RN  Outcome: Progressing Towards Goal  Note: Fall Risk Interventions:  Mobility Interventions: Bed/chair exit alarm, Communicate number of staff needed for ambulation/transfer, Patient to call before getting OOB, Utilize walker, cane, or other assistive device         Medication Interventions: Bed/chair exit alarm, Evaluate medications/consider consulting pharmacy, Patient to call before getting OOB    Elimination Interventions: Bed/chair exit alarm, Call light in reach    History of Falls Interventions: Bed/chair exit alarm, Evaluate medications/consider consulting pharmacy, Investigate reason for fall         Problem: Patient Education: Go to Patient Education Activity  Goal: Patient/Family Education  1/12/2023 1356 by Olena Baker RN  Outcome: Resolved/Met  1/12/2023 1352 by Olena Baker RN  Outcome: Progressing Towards Goal     Problem: Pain  Goal: *Control of Pain  1/12/2023 1356 by Olena Baker RN  Outcome: Resolved/Met  1/12/2023 1352 by Olena Baker RN  Outcome: Progressing Towards Goal     Problem: Patient Education: Go to Patient Education Activity  Goal: Patient/Family Education  1/12/2023 1356 by Madeleine Wei RN  Outcome: Resolved/Met  1/12/2023 1352 by Madeleine Wei RN  Outcome: Progressing Towards Goal     Problem: Patient Education: Go to Patient Education Activity  Goal: Patient/Family Education  1/12/2023 1356 by Madeleine Wei RN  Outcome: Resolved/Met  1/12/2023 1352 by Madeleine Wei RN  Outcome: Progressing Towards Goal     Problem: Heart Failure: Day 1  Goal: Off Pathway (Use only if patient is Off Pathway)  1/12/2023 1356 by Madeleine Wei RN  Outcome: Resolved/Met  1/12/2023 1352 by Madeleine Wei RN  Outcome: Progressing Towards Goal  Goal: Activity/Safety  1/12/2023 1356 by Madeleine Wei RN  Outcome: Resolved/Met  1/12/2023 1352 by Madeleine Wei RN  Outcome: Progressing Towards Goal  Goal: Consults, if ordered  1/12/2023 1356 by Madeleine Wei RN  Outcome: Resolved/Met  1/12/2023 1352 by Madeleine Wei RN  Outcome: Progressing Towards Goal  Goal: Diagnostic Test/Procedures  1/12/2023 1356 by Madeleine Wei RN  Outcome: Resolved/Met  1/12/2023 1352 by Madeleine Wei RN  Outcome: Progressing Towards Goal  Goal: Nutrition/Diet  1/12/2023 1356 by Madeleine Wei RN  Outcome: Resolved/Met  1/12/2023 1352 by Madeleine Wei RN  Outcome: Progressing Towards Goal  Goal: Discharge Planning  1/12/2023 1356 by Madeleine Wei RN  Outcome: Resolved/Met  1/12/2023 1352 by Madeleine Wei RN  Outcome: Progressing Towards Goal  Goal: Medications  1/12/2023 1356 by Madeleine Wei RN  Outcome: Resolved/Met  1/12/2023 1352 by Madeleine Wei RN  Outcome: Progressing Towards Goal  Goal: Respiratory  1/12/2023 1356 by Madeleine Wei RN  Outcome: Resolved/Met  1/12/2023 1352 by Madeleine Wei RN  Outcome: Progressing Towards Goal  Goal: Treatments/Interventions/Procedures  1/12/2023 1356 by Madeleine Wei RN  Outcome: Resolved/Met  1/12/2023 1352 by Madeleine Wei RN  Outcome: Progressing Towards Goal  Goal: Psychosocial  1/12/2023 1356 by Ana María Keenan RN  Outcome: Resolved/Met  1/12/2023 1352 by Ana María Keenan RN  Outcome: Progressing Towards Goal  Goal: *Oxygen saturation within defined limits  1/12/2023 1356 by Ana María Keenan RN  Outcome: Resolved/Met  1/12/2023 1352 by Ana María Keenan RN  Outcome: Progressing Towards Goal  Goal: *Hemodynamically stable  1/12/2023 1356 by Ana María Keenan RN  Outcome: Resolved/Met  1/12/2023 1352 by Ana María Keenan RN  Outcome: Progressing Towards Goal  Goal: *Optimal pain control at patient's stated goal  1/12/2023 1356 by Ana María Keenan RN  Outcome: Resolved/Met  1/12/2023 1352 by Ana María Keenan RN  Outcome: Progressing Towards Goal  Goal: *Anxiety reduced or absent  1/12/2023 1356 by Ana María Keenan RN  Outcome: Resolved/Met  1/12/2023 1352 by Ana María Keenan RN  Outcome: Progressing Towards Goal     Problem: Heart Failure: Day 2  Goal: Off Pathway (Use only if patient is Off Pathway)  1/12/2023 1356 by Ana María Keenan RN  Outcome: Resolved/Met  1/12/2023 1352 by Ana María Keenan RN  Outcome: Progressing Towards Goal  Goal: Activity/Safety  1/12/2023 1356 by Ana María Keenan RN  Outcome: Resolved/Met  1/12/2023 1352 by Ana María Keenan RN  Outcome: Progressing Towards Goal  Goal: Consults, if ordered  1/12/2023 1356 by Ana María Keenan RN  Outcome: Resolved/Met  1/12/2023 1352 by Ana María Keenan RN  Outcome: Progressing Towards Goal  Goal: Diagnostic Test/Procedures  1/12/2023 1356 by Ana María Keenan RN  Outcome: Resolved/Met  1/12/2023 1352 by Ana María Keenan RN  Outcome: Progressing Towards Goal  Goal: Nutrition/Diet  1/12/2023 1356 by Ana María Keenan RN  Outcome: Resolved/Met  1/12/2023 1352 by Ana María Keenan RN  Outcome: Progressing Towards Goal  Goal: Discharge Planning  1/12/2023 1356 by Ana María Keenan RN  Outcome: Resolved/Met  1/12/2023 1352 by Ana María Keenan, RN  Outcome: Progressing Towards Goal  Goal: Medications  1/12/2023 1356 by Arelis Prakash RN  Outcome: Resolved/Met  1/12/2023 1352 by Arelis Prakash RN  Outcome: Progressing Towards Goal  Goal: Respiratory  1/12/2023 1356 by Arelis Prakash, RN  Outcome: Resolved/Met  1/12/2023 1352 by Arelis Prakash RN  Outcome: Progressing Towards Goal  Goal: Treatments/Interventions/Procedures  1/12/2023 1356 by Arelis Prakash, RN  Outcome: Resolved/Met  1/12/2023 1352 by Arelis Prakash RN  Outcome: Progressing Towards Goal  Goal: Psychosocial  1/12/2023 1356 by Arelis Prakash, RN  Outcome: Resolved/Met  1/12/2023 1352 by Arelis Prakash RN  Outcome: Progressing Towards Goal  Goal: *Oxygen saturation within defined limits  1/12/2023 1356 by Arelis Prakash, RN  Outcome: Resolved/Met  1/12/2023 1352 by Arelis Prakash RN  Outcome: Progressing Towards Goal  Goal: *Hemodynamically stable  1/12/2023 1356 by Arelis Prakash, RN  Outcome: Resolved/Met  1/12/2023 1352 by Arelis Prakash RN  Outcome: Progressing Towards Goal  Goal: *Optimal pain control at patient's stated goal  1/12/2023 1356 by Arelis Prakash, RN  Outcome: Resolved/Met  1/12/2023 1352 by Arelis Prakash RN  Outcome: Progressing Towards Goal  Goal: *Anxiety reduced or absent  1/12/2023 1356 by Arelis Prakash, RN  Outcome: Resolved/Met  1/12/2023 1352 by Arelis Prakash RN  Outcome: Progressing Towards Goal  Goal: *Demonstrates progressive activity  1/12/2023 1356 by Arelis Prakash, RN  Outcome: Resolved/Met  1/12/2023 1352 by Arelis Prakash RN  Outcome: Progressing Towards Goal     Problem: Heart Failure: Day 3  Goal: Off Pathway (Use only if patient is Off Pathway)  1/12/2023 1356 by Arelis Prakash, RN  Outcome: Resolved/Met  1/12/2023 1352 by Arelis Prakash RN  Outcome: Progressing Towards Goal  Goal: Activity/Safety  1/12/2023 1356 by Arelis Prakash, RN  Outcome: Resolved/Met  1/12/2023 1352 by Arelis Prakash, RN  Outcome: Progressing Towards Goal  Goal: Diagnostic Test/Procedures  1/12/2023 1356 by Madeleine Wei RN  Outcome: Resolved/Met  1/12/2023 1352 by Madeleine Wei RN  Outcome: Progressing Towards Goal  Goal: Nutrition/Diet  1/12/2023 1356 by Madeleine Wei RN  Outcome: Resolved/Met  1/12/2023 1352 by Madeleine Wei RN  Outcome: Progressing Towards Goal  Goal: Discharge Planning  1/12/2023 1356 by Madeleine Wei RN  Outcome: Resolved/Met  1/12/2023 1352 by Madeleine Wei RN  Outcome: Progressing Towards Goal  Goal: Medications  1/12/2023 1356 by Madeleine Wei, RN  Outcome: Resolved/Met  1/12/2023 1352 by Madeleine Wei RN  Outcome: Progressing Towards Goal  Goal: Respiratory  1/12/2023 1356 by Madeleine Wei RN  Outcome: Resolved/Met  1/12/2023 1352 by Madeleine Wei RN  Outcome: Progressing Towards Goal  Goal: Treatments/Interventions/Procedures  1/12/2023 1356 by Madeleine Wei, RN  Outcome: Resolved/Met  1/12/2023 1352 by Madeleine Wei RN  Outcome: Progressing Towards Goal  Goal: Psychosocial  1/12/2023 1356 by Madeleine Wei, RN  Outcome: Resolved/Met  1/12/2023 1352 by Madeleine Wei RN  Outcome: Progressing Towards Goal  Goal: *Oxygen saturation within defined limits  1/12/2023 1356 by Madeleine Wei RN  Outcome: Resolved/Met  1/12/2023 1352 by Madeleine Wei RN  Outcome: Progressing Towards Goal  Goal: *Hemodynamically stable  1/12/2023 1356 by Madeleine Wei, RN  Outcome: Resolved/Met  1/12/2023 1352 by Madeleine Wei RN  Outcome: Progressing Towards Goal  Goal: *Optimal pain control at patient's stated goal  1/12/2023 1356 by Madeleine Wei, RN  Outcome: Resolved/Met  1/12/2023 1352 by Madeleine Wei RN  Outcome: Progressing Towards Goal  Goal: *Anxiety reduced or absent  1/12/2023 1356 by Madeleine Wei RN  Outcome: Resolved/Met  1/12/2023 1352 by Madeleine Wei RN  Outcome: Progressing Towards Goal  Goal: *Demonstrates progressive activity  1/12/2023 1356 by Frankey Raker, RN  Outcome: Resolved/Met  1/12/2023 1352 by Frankey Raker, RN  Outcome: Progressing Towards Goal     Problem: Heart Failure: Day 4  Goal: Off Pathway (Use only if patient is Off Pathway)  1/12/2023 1356 by Frankey Raker, RN  Outcome: Resolved/Met  1/12/2023 1352 by Frankey Raker, RN  Outcome: Progressing Towards Goal  Goal: Activity/Safety  1/12/2023 1356 by Frankey Raker, RN  Outcome: Resolved/Met  1/12/2023 1352 by Frankey Raker, RN  Outcome: Progressing Towards Goal  Goal: Diagnostic Test/Procedures  1/12/2023 1356 by Frankey Raker, RN  Outcome: Resolved/Met  1/12/2023 1352 by Frankey Raker, RN  Outcome: Progressing Towards Goal  Goal: Nutrition/Diet  1/12/2023 1356 by Frankey Raker, RN  Outcome: Resolved/Met  1/12/2023 1352 by Frankey Raker, RN  Outcome: Progressing Towards Goal  Goal: Discharge Planning  1/12/2023 1356 by Frankey Raker, RN  Outcome: Resolved/Met  1/12/2023 1352 by Frankey Raker, RN  Outcome: Progressing Towards Goal  Goal: Medications  1/12/2023 1356 by Frankey Raker, RN  Outcome: Resolved/Met  1/12/2023 1352 by Frankey Raker, RN  Outcome: Progressing Towards Goal  Goal: Respiratory  1/12/2023 1356 by Frankey Raker, RN  Outcome: Resolved/Met  1/12/2023 1352 by Frankey Raker, RN  Outcome: Progressing Towards Goal  Goal: Treatments/Interventions/Procedures  1/12/2023 1356 by Frankey Raker, RN  Outcome: Resolved/Met  1/12/2023 1352 by Frankey Raker, RN  Outcome: Progressing Towards Goal  Goal: Psychosocial  1/12/2023 1356 by Frankey Raker, RN  Outcome: Resolved/Met  1/12/2023 1352 by Frankey Raker, RN  Outcome: Progressing Towards Goal  Goal: *Oxygen saturation within defined limits  1/12/2023 1356 by Frankey Raker, RN  Outcome: Resolved/Met  1/12/2023 1352 by Frankey Raker, RN  Outcome: Progressing Towards Goal  Goal: *Hemodynamically stable  1/12/2023 1356 by Quentin Salazar, RN  Outcome: Resolved/Met  1/12/2023 1352 by Quentin Salazar, RN  Outcome: Progressing Towards Goal  Goal: *Optimal pain control at patient's stated goal  1/12/2023 1356 by Quentin Salazar, RN  Outcome: Resolved/Met  1/12/2023 1352 by Quentin Salazar, RN  Outcome: Progressing Towards Goal  Goal: *Anxiety reduced or absent  1/12/2023 1356 by Quentin Salazar, RN  Outcome: Resolved/Met  1/12/2023 1352 by Quentin Salazar, RN  Outcome: Progressing Towards Goal  Goal: *Demonstrates progressive activity  1/12/2023 1356 by Quentin Salazar, RN  Outcome: Resolved/Met  1/12/2023 1352 by Quentin Salazar, RN  Outcome: Progressing Towards Goal     Problem: Heart Failure: Day 5  Goal: Off Pathway (Use only if patient is Off Pathway)  1/12/2023 1356 by Quentin Salazar, RN  Outcome: Resolved/Met  1/12/2023 1352 by Quentin Salazar, RN  Outcome: Progressing Towards Goal  Goal: Activity/Safety  1/12/2023 1356 by Quentin Salazar, RN  Outcome: Resolved/Met  1/12/2023 1352 by Quentin Salazar, RN  Outcome: Progressing Towards Goal  Goal: Diagnostic Test/Procedures  1/12/2023 1356 by Quentin Salazar, RN  Outcome: Resolved/Met  1/12/2023 1352 by Quentin Salazar, RN  Outcome: Progressing Towards Goal  Goal: Nutrition/Diet  1/12/2023 1356 by Quentin Salazar, RN  Outcome: Resolved/Met  1/12/2023 1352 by Quentin Salazar, RN  Outcome: Progressing Towards Goal  Goal: Discharge Planning  1/12/2023 1356 by Quentin Salazar, RN  Outcome: Resolved/Met  1/12/2023 1352 by Quentin Salazar, RN  Outcome: Progressing Towards Goal  Goal: Medications  1/12/2023 1356 by Quentin Salazar, RN  Outcome: Resolved/Met  1/12/2023 1352 by Quentin Salazar, RN  Outcome: Progressing Towards Goal  Goal: Respiratory  1/12/2023 1356 by Quentin Salazar, RN  Outcome: Resolved/Met  1/12/2023 1352 by Quentin Salazar, RN  Outcome: Progressing Towards Goal  Goal: Treatments/Interventions/Procedures  1/12/2023 1356 by Celi Graham RN  Outcome: Resolved/Met  1/12/2023 1352 by Celi Graham RN  Outcome: Progressing Towards Goal  Goal: Psychosocial  1/12/2023 1356 by Celi Graham RN  Outcome: Resolved/Met  1/12/2023 1352 by Celi Graham RN  Outcome: Progressing Towards Goal     Problem: Heart Failure: Discharge Outcomes  Goal: *Demonstrates ability to perform prescribed activity without shortness of breath or discomfort  1/12/2023 1355 by Celi Graham RN  Outcome: Resolved/Met  1/12/2023 1352 by Celi Graham RN  Outcome: Progressing Towards Goal  Goal: *Left ventricular function assessment completed prior to or during stay, or planned for post-discharge  1/12/2023 Ab[place]5 by Celi Graham RN  Outcome: Resolved/Met  1/12/2023 1352 by Celi Graham RN  Outcome: Progressing Towards Goal  Goal: *ACEI prescribed if LVEF less than 40% and no contraindications or ARB prescribed  1/12/2023 1355 by Celi Graham RN  Outcome: Resolved/Met  1/12/2023 1352 by Celi Graham RN  Outcome: Progressing Towards Goal  Goal: *Verbalizes understanding and describes prescribed diet  1/12/2023 1355 by Celi Graham RN  Outcome: Resolved/Met  1/12/2023 1352 by Celi Graham RN  Outcome: Progressing Towards Goal  Goal: *Verbalizes understanding/describes prescribed medications  1/12/2023 1355 by Celi Graham RN  Outcome: Resolved/Met  1/12/2023 1352 by Celi Graham RN  Outcome: Progressing Towards Goal  Goal: *Describes available resources and support systems  Description: (eg: Home Health, Palliative Care, Advanced Medical Directive)  1/12/2023 1355 by Celi Graham RN  Outcome: Resolved/Met  1/12/2023 1352 by Celi Graham RN  Outcome: Progressing Towards Goal  Goal: *Describes smoking cessation resources  1/12/2023 1355 by Celi Graham RN  Outcome: Resolved/Met  1/12/2023 1352 by Celi Graham RN  Outcome: Progressing Towards Goal  Goal: *Understands and describes signs and symptoms to report to providers(Stroke Metric)  1/12/2023 1355 by Cyrus Casas RN  Outcome: Resolved/Met  1/12/2023 1352 by Cyrus Casas RN  Outcome: Progressing Towards Goal  Goal: *Describes/verbalizes understanding of follow-up/return appt  Description: (eg: to physicians, diabetes treatment coordinator, and other resources  1/12/2023 1355 by Cyrus Casas RN  Outcome: Resolved/Met  1/12/2023 1352 by Cyrus Casas RN  Outcome: Progressing Towards Goal  Goal: *Describes importance of continuing daily weights and changes to report to physician  1/12/2023 1355 by Cyrus Casas RN  Outcome: Resolved/Met  1/12/2023 1352 by Cyrus Casas RN  Outcome: Progressing Towards Goal     Problem: Pressure Injury - Risk of  Goal: *Prevention of pressure injury  Description: Document Iggy Scale and appropriate interventions in the flowsheet. 1/12/2023 1355 by Cyrus Casas RN  Outcome: Resolved/Met  Note: Pressure Injury Interventions:  Sensory Interventions: Assess changes in LOC, Avoid rigorous massage over bony prominences, Check visual cues for pain, Float heels, Keep linens dry and wrinkle-free, Maintain/enhance activity level, Monitor skin under medical devices    Moisture Interventions: Minimize layers, Absorbent underpads, Apply protective barrier, creams and emollients    Activity Interventions: Pressure redistribution bed/mattress(bed type)    Mobility Interventions: Pressure redistribution bed/mattress (bed type), Turn and reposition approx.  every two hours(pillow and wedges)    Nutrition Interventions: Document food/fluid/supplement intake, Discuss nutritional consult with provider, Offer support with meals,snacks and hydration    Friction and Shear Interventions: Apply protective barrier, creams and emollients, Feet elevated on foot rest             1/12/2023 1352 by Cyrus Casas RN  Outcome: Progressing Towards Goal  Note: Pressure Injury Interventions:  Sensory Interventions: Assess changes in LOC, Avoid rigorous massage over bony prominences, Check visual cues for pain, Float heels, Keep linens dry and wrinkle-free, Maintain/enhance activity level, Monitor skin under medical devices    Moisture Interventions: Minimize layers, Absorbent underpads, Apply protective barrier, creams and emollients    Activity Interventions: Pressure redistribution bed/mattress(bed type)    Mobility Interventions: Pressure redistribution bed/mattress (bed type), Turn and reposition approx.  every two hours(pillow and wedges)    Nutrition Interventions: Document food/fluid/supplement intake, Discuss nutritional consult with provider, Offer support with meals,snacks and hydration    Friction and Shear Interventions: Apply protective barrier, creams and emollients, Feet elevated on foot rest                Problem: Patient Education: Go to Patient Education Activity  Goal: Patient/Family Education  1/12/2023 1355 by Faiza Shrestha RN  Outcome: Resolved/Met  1/12/2023 1352 by Faiza Shrestha RN  Outcome: Progressing Towards Goal     Problem: Patient Education: Go to Patient Education Activity  Goal: Patient/Family Education  1/12/2023 1355 by Faiza Shrestha RN  Outcome: Resolved/Met  1/12/2023 1352 by Faiza Shrestha RN  Outcome: Progressing Towards Goal     Problem: Patient Education: Go to Patient Education Activity  Goal: Patient/Family Education  1/12/2023 1355 by Faiza Shrestha RN  Outcome: Resolved/Met  1/12/2023 1352 by Faiza Shrestha RN  Outcome: Progressing Towards Goal

## 2023-01-12 NOTE — PROGRESS NOTES
Hospitalist Progress Note    Patient: Ema Grande MRN: [id number]  CSN: [id number]    YOB: 1927  Age: 80 y.o. Sex: female    DOA: 1/2/2023 LOS:  LOS: 10 days          Chief Complaint:    SOB      Assessment/Plan     SOB  81 y/o female with HTN, hypothyroidism, and aortic stenosis is admitted for an acute CHF exacerbation with new onset atrial fibrillation. Acute CHF exacerbation-  Better  Change to lasix PO  Replace potassium PO  Strict I/O    Echocardiogram showed normal left ventricular systolic function, EF of 60 to 65%, normal wall motion severe aortic stenosis  Cardiology following     New onset atrial fibrillation-  rate controlled on lopressor  High risk for fall, not on anticoagulation     Severe Aortic stenosis-  declined TAVR in the past     Hypokalemia-daily Kdur     HTN  Monitor BP     Hypothyroidism  TSH 6.23  Continue synthroid    Ready to d/c, SNF planned  Disposition :  Patient Active Problem List   Diagnosis Code    Fall W19. XXXA    Rib contusion, right, initial encounter S20.211A    Right shoulder injury, initial encounter S49. 91XA    Acute exacerbation of CHF (congestive heart failure) (HCC) I50.9    HTN (hypertension) I10    Hypothyroidism E03.9    New onset atrial fibrillation (HCC) I48.91    Aortic stenosis I35.0    Hypomagnesemia E83.42       Subjective:    I feel ok      I    Review of systems:    Constitutional: denies fevers, chill  Respiratory: denies SOB  Cardiovascular: denies chest pain, palpitations  Gastrointestinal: denies nausea, vomiting, diarrhea      Vital signs/Intake and Output:  Visit Vitals  BP (!) 112/57   Pulse 68   Temp 97.5 °F (36.4 °C)   Resp 16   Ht 5' 1\" (1.549 m)   Wt 67.9 kg (149 lb 11.1 oz)   SpO2 94%   BMI 28.28 kg/m²     Current Shift:  No intake/output data recorded. Last three shifts:  No intake/output data recorded.     Exam:    General: elderly WF sitting edge of bed NAD  CVS:Regular rate and rhythm, ANGELITA 2-3/6  Lungs:Clear to auscultation bilaterally, no wheezes, rhonchi, or rales  Abdomen: Soft, Nontender, No distention  Extremities: No C/C/E, pulses palpable 2+  Neuro:grossly normal , follows commands  Psych:appropriate                Labs: Results:       Chemistry Recent Labs     01/11/23  0450 01/10/23  0120   GLU 93 96   * 134*   K 5.4 4.4    97*   CO2 30 29   BUN 22* 27*   CREA 0.98 1.00   CA 9.0 8.7   AGAP 3 8   BUCR 22* 27*        CBC w/Diff No results for input(s): WBC, RBC, HGB, HCT, PLT, GRANS, LYMPH, EOS, HGBEXT, HCTEXT, PLTEXT, HGBEXT, HCTEXT, PLTEXT in the last 72 hours. Cardiac Enzymes No results for input(s): CPK, CKND1, ENZO in the last 72 hours. No lab exists for component: CKRMB, TROIP   Coagulation No results for input(s): PTP, INR, APTT, INREXT, INREXT in the last 72 hours. Lipid Panel No results found for: CHOL, CHOLPOCT, CHOLX, CHLST, CHOLV, 208148, HDL, HDLP, LDL, LDLC, DLDLP, 287373, VLDLC, VLDL, TGLX, TRIGL, TRIGP, TGLPOCT, CHHD, CHHDX   BNP No results for input(s): BNPP in the last 72 hours. Liver Enzymes No results for input(s): TP, ALB, TBIL, AP in the last 72 hours.     No lab exists for component: SGOT, GPT, DBIL     Thyroid Studies Lab Results   Component Value Date/Time    TSH 6.23 (H) 01/02/2023 01:45 PM        Procedures/imaging: see electronic medical records for all procedures/Xrays and details which were not copied into this note but were reviewed prior to creation of Gavi Zambrano MD

## 2023-01-12 NOTE — PROGRESS NOTES
D/c plan: Return to [place] with [facility]. CM has been informed by family that [address] at [facility] completed a virtual assessment w/ the pt and informed them that the pt can return. [facility] have been entered and will be faxed to the [facility] at [phone] and [facility] will pass those orders on to the [facility] agency they partner with. Pt's family will transport the pt. Care Management Interventions  PCP Verified by CM: Yes  Mode of Transport at Discharge:  Other (see comment) (family to transport )  Transition of Care Consult (CM Consult): Home Health, Assisted Living ([facility] [address] [facility])  Partner SNF: Yes  Discharge Durable Medical Equipment: No (Pt has a RW.)  Health Maintenance Reviewed: Yes  Physical Therapy Consult: Yes  Occupational Therapy Consult: Yes  Support Systems: Child(ana), Assisted Living  Confirm Follow Up Transport: Family  The Plan for Transition of Care is Related to the Following Treatment Goals : [place] [facility]  The Patient and/or Patient Representative was Provided with a Choice of Provider and Agrees with the Discharge Plan?: Yes  Freedom of Choice List was Provided with Basic Dialogue that Supports the Patient's Individualized Plan of Care/Goals, Treatment Preferences and Shares the Quality Data Associated with the Providers?:  ([place] and [place] )  Discharge Location  Patient Expects to be Discharged to[de-identified] Home with home health

## 2023-01-13 NOTE — PROGRESS NOTES
Physician Progress Note      PATIENT:               Sherry Renee  CSN #:                  [id number]  :                       3/22/1927  ADMIT DATE:       2023 1:29 PM  [address] [place] DATE:        2023 2:56 PM  RESPONDING  PROVIDER #:        Abena Joyner MD          QUERY TEXT:    Pt admitted with acute CHF and new onset atrial fibrillation. Pt noted to also have HTN and aortic stenosis. If possible, please document in progress notes and discharge summary the etiology of CHF, if able to be determined. The medical record reflects the following:  Risk Factors: Aortic valve stenosis; HTN  Clinical Indicators:  ED PROVIDER NOTE 2023 - No history of CHF  MD PROGRESS NOTE 2023 - Severe Aortic stenosis  MD PROGRESS NOTE 2023 - Echocardiogram showed normal left ventricular systolic function, EF of 60 to 65%, normal wall motion severe aortic stenosis  CARDIOLOGY CONSULTATION 2023 - Patient known history of aortic valve stenosis does not want TAVR or SAVR. Acute on chronic diastolic heart failure  Lab report - NT pro-BNP 2,379 (H)  MAR - furosemide (LASIX) injection 40 mg  Discharge Summary 2023 - She again declined any further evaluation for intervention for her aortic valve disease, beta-blocker was adjusted here. Treatment: ECHO; lasix; Cardiology consult    Thank you,  Tamara Arita [email or web address]  Options provided:  -- CHF due to Hypertensive Heart Disease  -- CHF due to Hypertensive Heart Disease and Valvular Heart Disease  -- CHF not due to Hypertension but due to valvular heart disease  -- Other - I will add my own diagnosis  -- Disagree - Not applicable / Not valid  -- Disagree - Clinically unable to determine / Unknown  -- Refer to Clinical Documentation Reviewer    PROVIDER RESPONSE TEXT:    This patient has CHF due to hypertensive heart disease and valvular heart disease.     Query created by: Nishant Pena on 2023 7:45 AM      Electronically signed by:  Enrico Cerrato MD 1/13/2023 8:54 AM

## 2023-03-15 ENCOUNTER — APPOINTMENT (OUTPATIENT)
Facility: HOSPITAL | Age: 88
DRG: 184 | End: 2023-03-15
Payer: MEDICARE

## 2023-03-15 ENCOUNTER — HOSPITAL ENCOUNTER (INPATIENT)
Facility: HOSPITAL | Age: 88
LOS: 7 days | Discharge: NURSING, REHAB OR LONG-TERM CARE | DRG: 184 | End: 2023-03-22
Attending: EMERGENCY MEDICINE | Admitting: FAMILY MEDICINE
Payer: MEDICARE
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
## 2023-03-15 DIAGNOSIS — S22.41XA CLOSED FRACTURE OF MULTIPLE RIBS OF RIGHT SIDE, INITIAL ENCOUNTER: ICD-10-CM

## 2023-03-15 DIAGNOSIS — S22.42XA CLOSED FRACTURE OF MULTIPLE RIBS OF LEFT SIDE, INITIAL ENCOUNTER: ICD-10-CM

## 2023-03-15 PROBLEM — E03.9 HYPOTHYROIDISM: Status: ACTIVE | Noted: 2023-01-02

## 2023-03-15 PROBLEM — Z71.89 ACP (ADVANCE CARE PLANNING): Status: ACTIVE | Noted: 2023-03-15

## 2023-03-15 PROBLEM — S22.39XS: Status: RESOLVED | Noted: 2023-03-15 | Resolved: 2023-03-15

## 2023-03-15 PROBLEM — S22.39XS: Status: ACTIVE | Noted: 2023-03-15

## 2023-03-15 PROBLEM — R54 FRAIL ELDERLY: Status: ACTIVE | Noted: 2023-03-15

## 2023-03-15 PROBLEM — I10 HTN (HYPERTENSION): Status: ACTIVE | Noted: 2023-01-02

## 2023-03-15 PROBLEM — S22.49XA MULTIPLE RIB FRACTURES: Status: ACTIVE | Noted: 2023-03-15

## 2023-03-15 PROBLEM — R29.6 MULTIPLE FALLS: Status: ACTIVE | Noted: 2023-03-15

## 2023-03-15 PROBLEM — I48.20 CHRONIC ATRIAL FIBRILLATION (HCC): Status: ACTIVE | Noted: 2023-03-15

## 2023-03-15 PROBLEM — I50.9 CHRONIC CONGESTIVE HEART FAILURE (HCC): Status: ACTIVE | Noted: 2023-03-15

## 2023-03-15 LAB
ABO + RH BLD: NORMAL
ALBUMIN SERPL-MCNC: 3.4 G/DL (ref 3.4–5)
ALBUMIN/GLOB SERPL: 1.1 (ref 0.8–1.7)
ALP SERPL-CCNC: 98 U/L (ref 45–117)
ALT SERPL-CCNC: 23 U/L (ref 13–56)
ANION GAP SERPL CALC-SCNC: 8 MMOL/L (ref 3–18)
APPEARANCE UR: CLEAR
AST SERPL-CCNC: 30 U/L (ref 10–38)
BACTERIA URNS QL MICRO: ABNORMAL /HPF
BASOPHILS # BLD: 0 K/UL (ref 0–0.1)
BASOPHILS NFR BLD: 0 % (ref 0–2)
BILIRUB SERPL-MCNC: 0.4 MG/DL (ref 0.2–1)
BILIRUB UR QL: NEGATIVE
BLOOD GROUP ANTIBODIES SERPL: NORMAL
BUN SERPL-MCNC: 23 MG/DL (ref 7–18)
BUN/CREAT SERPL: 27 (ref 12–20)
CALCIUM SERPL-MCNC: 8.7 MG/DL (ref 8.5–10.1)
CHLORIDE SERPL-SCNC: 103 MMOL/L (ref 100–111)
CO2 SERPL-SCNC: 25 MMOL/L (ref 21–32)
COLOR UR: YELLOW
CREAT SERPL-MCNC: 0.85 MG/DL (ref 0.6–1.3)
DIFFERENTIAL METHOD BLD: ABNORMAL
EOSINOPHIL # BLD: 0.1 K/UL (ref 0–0.4)
EOSINOPHIL NFR BLD: 1 % (ref 0–5)
EPITH CASTS URNS QL MICRO: ABNORMAL /LPF (ref 0–5)
ERYTHROCYTE [DISTWIDTH] IN BLOOD BY AUTOMATED COUNT: 14.3 % (ref 11.6–14.5)
GLOBULIN SER CALC-MCNC: 3.2 G/DL (ref 2–4)
GLUCOSE SERPL-MCNC: 156 MG/DL (ref 74–99)
GLUCOSE UR STRIP.AUTO-MCNC: NEGATIVE MG/DL
HCT VFR BLD AUTO: 37.7 % (ref 35–45)
HGB BLD-MCNC: 12.4 G/DL (ref 12–16)
HGB UR QL STRIP: NEGATIVE
IMM GRANULOCYTES # BLD AUTO: 0.1 K/UL (ref 0–0.04)
IMM GRANULOCYTES NFR BLD AUTO: 1 % (ref 0–0.5)
INR PPP: 0.9 (ref 0.8–1.2)
KETONES UR QL STRIP.AUTO: NEGATIVE MG/DL
LEUKOCYTE ESTERASE UR QL STRIP.AUTO: ABNORMAL
LYMPHOCYTES # BLD: 0.8 K/UL (ref 0.9–3.6)
LYMPHOCYTES NFR BLD: 7 % (ref 21–52)
MCH RBC QN AUTO: 32 PG (ref 24–34)
MCHC RBC AUTO-ENTMCNC: 32.9 G/DL (ref 31–37)
MCV RBC AUTO: 97.4 FL (ref 78–100)
MONOCYTES # BLD: 0.8 K/UL (ref 0.05–1.2)
MONOCYTES NFR BLD: 7 % (ref 3–10)
NEUTS SEG # BLD: 9 K/UL (ref 1.8–8)
NEUTS SEG NFR BLD: 84 % (ref 40–73)
NITRITE UR QL STRIP.AUTO: NEGATIVE
NRBC # BLD: 0 K/UL (ref 0–0.01)
NRBC BLD-RTO: 0 PER 100 WBC
PH UR STRIP: 5.5 (ref 5–8)
PLATELET # BLD AUTO: 251 K/UL (ref 135–420)
PMV BLD AUTO: 10.7 FL (ref 9.2–11.8)
POTASSIUM SERPL-SCNC: 3.9 MMOL/L (ref 3.5–5.5)
PROT SERPL-MCNC: 6.6 G/DL (ref 6.4–8.2)
PROT UR STRIP-MCNC: NEGATIVE MG/DL
PROTHROMBIN TIME: 12.7 SEC (ref 11.5–15.2)
RBC # BLD AUTO: 3.87 M/UL (ref 4.2–5.3)
RBC #/AREA URNS HPF: NEGATIVE /HPF (ref 0–5)
SODIUM SERPL-SCNC: 136 MMOL/L (ref 136–145)
SP GR UR REFRACTOMETRY: 1.01 (ref 1–1.03)
SPECIMEN EXP DATE BLD: NORMAL
UROBILINOGEN UR QL STRIP.AUTO: 0.2 EU/DL (ref 0.2–1)
WBC # BLD AUTO: 10.7 K/UL (ref 4.6–13.2)
WBC URNS QL MICRO: ABNORMAL /HPF (ref 0–5)

## 2023-03-15 PROCEDURE — 36415 COLL VENOUS BLD VENIPUNCTURE: CPT

## 2023-03-15 PROCEDURE — 1100000000 HC RM PRIVATE

## 2023-03-15 PROCEDURE — 6370000000 HC RX 637 (ALT 250 FOR IP): Performed by: EMERGENCY MEDICINE

## 2023-03-15 PROCEDURE — 73110 X-RAY EXAM OF WRIST: CPT

## 2023-03-15 PROCEDURE — 71101 X-RAY EXAM UNILAT RIBS/CHEST: CPT

## 2023-03-15 PROCEDURE — 80053 COMPREHEN METABOLIC PANEL: CPT

## 2023-03-15 PROCEDURE — 73100 X-RAY EXAM OF WRIST: CPT

## 2023-03-15 PROCEDURE — 72125 CT NECK SPINE W/O DYE: CPT

## 2023-03-15 PROCEDURE — 85025 COMPLETE CBC W/AUTO DIFF WBC: CPT

## 2023-03-15 PROCEDURE — 85610 PROTHROMBIN TIME: CPT

## 2023-03-15 PROCEDURE — 99285 EMERGENCY DEPT VISIT HI MDM: CPT

## 2023-03-15 PROCEDURE — 93005 ELECTROCARDIOGRAM TRACING: CPT | Performed by: EMERGENCY MEDICINE

## 2023-03-15 PROCEDURE — 6370000000 HC RX 637 (ALT 250 FOR IP): Performed by: FAMILY MEDICINE

## 2023-03-15 PROCEDURE — 73120 X-RAY EXAM OF HAND: CPT

## 2023-03-15 PROCEDURE — 86900 BLOOD TYPING SEROLOGIC ABO: CPT

## 2023-03-15 PROCEDURE — 73502 X-RAY EXAM HIP UNI 2-3 VIEWS: CPT

## 2023-03-15 PROCEDURE — 71260 CT THORAX DX C+: CPT

## 2023-03-15 PROCEDURE — 6360000004 HC RX CONTRAST MEDICATION: Performed by: EMERGENCY MEDICINE

## 2023-03-15 PROCEDURE — 70450 CT HEAD/BRAIN W/O DYE: CPT

## 2023-03-15 PROCEDURE — 73130 X-RAY EXAM OF HAND: CPT

## 2023-03-15 PROCEDURE — 96374 THER/PROPH/DIAG INJ IV PUSH: CPT

## 2023-03-15 PROCEDURE — 81001 URINALYSIS AUTO W/SCOPE: CPT

## 2023-03-15 PROCEDURE — 6360000002 HC RX W HCPCS: Performed by: EMERGENCY MEDICINE

## 2023-03-15 RX ORDER — FUROSEMIDE 40 MG/1
40 TABLET ORAL DAILY
Status: DISCONTINUED | OUTPATIENT
Start: 2023-03-16 | End: 2023-03-22 | Stop reason: HOSPADM

## 2023-03-15 RX ORDER — KETOROLAC TROMETHAMINE 15 MG/ML
15 INJECTION, SOLUTION INTRAMUSCULAR; INTRAVENOUS
Status: COMPLETED | OUTPATIENT
Start: 2023-03-15 | End: 2023-03-15

## 2023-03-15 RX ORDER — LANOLIN ALCOHOL/MO/W.PET/CERES
400 CREAM (GRAM) TOPICAL NIGHTLY
Status: DISCONTINUED | OUTPATIENT
Start: 2023-03-15 | End: 2023-03-22 | Stop reason: HOSPADM

## 2023-03-15 RX ORDER — LIDOCAINE 4 G/G
1 PATCH TOPICAL DAILY
Status: DISCONTINUED | OUTPATIENT
Start: 2023-03-15 | End: 2023-03-22 | Stop reason: HOSPADM

## 2023-03-15 RX ORDER — LANOLIN ALCOHOL/MO/W.PET/CERES
1000 CREAM (GRAM) TOPICAL DAILY
Status: DISCONTINUED | OUTPATIENT
Start: 2023-03-16 | End: 2023-03-22 | Stop reason: HOSPADM

## 2023-03-15 RX ORDER — HYDROMORPHONE HYDROCHLORIDE 1 MG/ML
0.25 INJECTION, SOLUTION INTRAMUSCULAR; INTRAVENOUS; SUBCUTANEOUS
Status: COMPLETED | OUTPATIENT
Start: 2023-03-15 | End: 2023-03-15

## 2023-03-15 RX ORDER — METHOCARBAMOL 500 MG/1
500 TABLET, FILM COATED ORAL
Status: COMPLETED | OUTPATIENT
Start: 2023-03-15 | End: 2023-03-15

## 2023-03-15 RX ORDER — VITAMIN B COMPLEX
1000 TABLET ORAL DAILY
Status: DISCONTINUED | OUTPATIENT
Start: 2023-03-16 | End: 2023-03-22 | Stop reason: HOSPADM

## 2023-03-15 RX ADMIN — HYDROMORPHONE HYDROCHLORIDE 0.25 MG: 1 INJECTION, SOLUTION INTRAMUSCULAR; INTRAVENOUS; SUBCUTANEOUS at 13:07

## 2023-03-15 RX ADMIN — METOPROLOL TARTRATE 25 MG: 25 TABLET, FILM COATED ORAL at 22:26

## 2023-03-15 RX ADMIN — IOPAMIDOL 100 ML: 612 INJECTION, SOLUTION INTRAVENOUS at 16:06

## 2023-03-15 RX ADMIN — METHOCARBAMOL 500 MG: 500 TABLET ORAL at 18:24

## 2023-03-15 RX ADMIN — KETOROLAC TROMETHAMINE 15 MG: 15 INJECTION, SOLUTION INTRAMUSCULAR; INTRAVENOUS at 18:24

## 2023-03-15 RX ADMIN — MAGNESIUM OXIDE TAB 400 MG (241.3 MG ELEMENTAL MG) 400 MG: 400 (241.3 MG) TAB at 22:27

## 2023-03-15 ASSESSMENT — PAIN DESCRIPTION - ORIENTATION: ORIENTATION: RIGHT

## 2023-03-15 ASSESSMENT — PAIN DESCRIPTION - DESCRIPTORS: DESCRIPTORS: SHARP

## 2023-03-15 ASSESSMENT — PAIN SCALES - GENERAL: PAINLEVEL_OUTOF10: 10

## 2023-03-15 ASSESSMENT — PAIN DESCRIPTION - LOCATION: LOCATION: RIB CAGE

## 2023-03-15 ASSESSMENT — PAIN - FUNCTIONAL ASSESSMENT: PAIN_FUNCTIONAL_ASSESSMENT: 0-10

## 2023-03-15 NOTE — ED TRIAGE NOTES
Arrived by EMS from nursing facility for witnessed fall from standing. Patient reports she tripped w/ walker. Endorses right rib cage pain. Reports she hit her head. Denies LOC. Skin tear right hand. Daughter @ bedside.

## 2023-03-15 NOTE — H&P
Glucose, UA Negative NEG mg/dL    Ketones, Urine Negative NEG mg/dL    Bilirubin Urine Negative NEG      Blood, Urine Negative NEG      Urobilinogen, Urine 0.2 0.2 - 1.0 EU/dL    Nitrite, Urine Negative NEG      Leukocyte Esterase, Urine SMALL (A) NEG     Urinalysis, Micro    Collection Time: 03/15/23  3:42 PM   Result Value Ref Range    WBC, UA 11 to 20 0 - 5 /hpf    RBC, UA Negative 0 - 5 /hpf    Epithelial Cells UA 1+ 0 - 5 /lpf    BACTERIA, URINE FEW (A) NEG /hpf   Comprehensive Metabolic Panel    Collection Time: 03/15/23  7:31 PM   Result Value Ref Range    Sodium 136 136 - 145 mmol/L    Potassium 3.9 3.5 - 5.5 mmol/L    Chloride 103 100 - 111 mmol/L    CO2 25 21 - 32 mmol/L    Anion Gap 8 3.0 - 18 mmol/L    Glucose 156 (H) 74 - 99 mg/dL    BUN 23 (H) 7.0 - 18 MG/DL    Creatinine 0.85 0.6 - 1.3 MG/DL    Bun/Cre Ratio 27 (H) 12 - 20      Est, Glom Filt Rate >60 >60 ml/min/1.73m2    Calcium 8.7 8.5 - 10.1 MG/DL    Total Bilirubin 0.4 0.2 - 1.0 MG/DL    ALT 23 13 - 56 U/L    AST 30 10 - 38 U/L    Alk Phosphatase 98 45 - 117 U/L    Total Protein 6.6 6.4 - 8.2 g/dL    Albumin 3.4 3.4 - 5.0 g/dL    Globulin 3.2 2.0 - 4.0 g/dL    Albumin/Globulin Ratio 1.1 0.8 - 1.7         Procedures/imaging: see electronic medical records for all procedures/Xrays and details which were not copied into this note but were reviewed prior to creation of Plan        CC: Wilmar Christopher DO

## 2023-03-16 PROCEDURE — 6370000000 HC RX 637 (ALT 250 FOR IP)

## 2023-03-16 PROCEDURE — 6370000000 HC RX 637 (ALT 250 FOR IP): Performed by: FAMILY MEDICINE

## 2023-03-16 PROCEDURE — 6370000000 HC RX 637 (ALT 250 FOR IP): Performed by: EMERGENCY MEDICINE

## 2023-03-16 PROCEDURE — 1100000000 HC RM PRIVATE

## 2023-03-16 PROCEDURE — 6360000002 HC RX W HCPCS: Performed by: FAMILY MEDICINE

## 2023-03-16 RX ORDER — KETOROLAC TROMETHAMINE 15 MG/ML
15 INJECTION, SOLUTION INTRAMUSCULAR; INTRAVENOUS EVERY 6 HOURS PRN
Status: DISPENSED | OUTPATIENT
Start: 2023-03-16 | End: 2023-03-21

## 2023-03-16 RX ORDER — ACETAMINOPHEN 500 MG
1000 TABLET ORAL EVERY 8 HOURS PRN
Status: CANCELLED | OUTPATIENT
Start: 2023-03-16

## 2023-03-16 RX ORDER — ACETAMINOPHEN 500 MG
TABLET ORAL
Status: COMPLETED
Start: 2023-03-16 | End: 2023-03-16

## 2023-03-16 RX ORDER — ACETAMINOPHEN 500 MG
1000 TABLET ORAL EVERY 8 HOURS PRN
Status: DISCONTINUED | OUTPATIENT
Start: 2023-03-16 | End: 2023-03-22 | Stop reason: HOSPADM

## 2023-03-16 RX ADMIN — KETOROLAC TROMETHAMINE 15 MG: 15 INJECTION, SOLUTION INTRAMUSCULAR; INTRAVENOUS at 15:15

## 2023-03-16 RX ADMIN — MAGNESIUM OXIDE TAB 400 MG (241.3 MG ELEMENTAL MG) 400 MG: 400 (241.3 MG) TAB at 21:14

## 2023-03-16 RX ADMIN — ACETAMINOPHEN: 500 TABLET ORAL at 05:09

## 2023-03-16 RX ADMIN — METOPROLOL TARTRATE 25 MG: 25 TABLET, FILM COATED ORAL at 21:14

## 2023-03-16 RX ADMIN — Medication 1000 UNITS: at 09:46

## 2023-03-16 RX ADMIN — KETOROLAC TROMETHAMINE 15 MG: 15 INJECTION, SOLUTION INTRAMUSCULAR; INTRAVENOUS at 21:18

## 2023-03-16 RX ADMIN — LEVOTHYROXINE SODIUM 137.5 MCG: 100 TABLET ORAL at 06:43

## 2023-03-16 RX ADMIN — METOPROLOL TARTRATE 25 MG: 25 TABLET, FILM COATED ORAL at 11:52

## 2023-03-16 RX ADMIN — CYANOCOBALAMIN TAB 1000 MCG 1000 MCG: 1000 TAB at 09:45

## 2023-03-16 ASSESSMENT — PAIN SCALES - GENERAL
PAINLEVEL_OUTOF10: 8
PAINLEVEL_OUTOF10: 10
PAINLEVEL_OUTOF10: 10
PAINLEVEL_OUTOF10: 0
PAINLEVEL_OUTOF10: 8

## 2023-03-16 ASSESSMENT — PAIN DESCRIPTION - ORIENTATION
ORIENTATION: RIGHT
ORIENTATION: RIGHT;LEFT;ANTERIOR;POSTERIOR

## 2023-03-16 ASSESSMENT — PAIN SCALES - WONG BAKER
WONGBAKER_NUMERICALRESPONSE: 6
WONGBAKER_NUMERICALRESPONSE: 0

## 2023-03-16 ASSESSMENT — PAIN DESCRIPTION - LOCATION
LOCATION: RIB CAGE;ARM;BACK
LOCATION: RIB CAGE
LOCATION: RIB CAGE

## 2023-03-16 ASSESSMENT — PAIN DESCRIPTION - DESCRIPTORS
DESCRIPTORS: ACHING
DESCRIPTORS: SHARP;ACHING

## 2023-03-16 ASSESSMENT — PAIN - FUNCTIONAL ASSESSMENT: PAIN_FUNCTIONAL_ASSESSMENT: ACTIVITIES ARE NOT PREVENTED

## 2023-03-17 LAB
EKG ATRIAL RATE: 85 BPM
EKG ATRIAL RATE: 86 BPM
EKG DIAGNOSIS: NORMAL
EKG DIAGNOSIS: NORMAL
EKG P AXIS: 57 DEGREES
EKG P AXIS: 72 DEGREES
EKG P-R INTERVAL: 218 MS
EKG P-R INTERVAL: 224 MS
EKG Q-T INTERVAL: 408 MS
EKG Q-T INTERVAL: 420 MS
EKG QRS DURATION: 126 MS
EKG QRS DURATION: 128 MS
EKG QTC CALCULATION (BAZETT): 485 MS
EKG QTC CALCULATION (BAZETT): 502 MS
EKG R AXIS: -77 DEGREES
EKG R AXIS: -82 DEGREES
EKG T AXIS: 29 DEGREES
EKG T AXIS: 36 DEGREES
EKG VENTRICULAR RATE: 85 BPM
EKG VENTRICULAR RATE: 86 BPM
FOLATE SERPL-MCNC: >20 NG/ML (ref 3.1–17.5)
IRON SATN MFR SERPL: 14 % (ref 20–50)
IRON SERPL-MCNC: 47 UG/DL (ref 50–175)
TIBC SERPL-MCNC: 341 UG/DL (ref 250–450)
VIT B12 SERPL-MCNC: 869 PG/ML (ref 211–911)

## 2023-03-17 PROCEDURE — 36415 COLL VENOUS BLD VENIPUNCTURE: CPT

## 2023-03-17 PROCEDURE — 83540 ASSAY OF IRON: CPT

## 2023-03-17 PROCEDURE — 82607 VITAMIN B-12: CPT

## 2023-03-17 PROCEDURE — 6370000000 HC RX 637 (ALT 250 FOR IP): Performed by: EMERGENCY MEDICINE

## 2023-03-17 PROCEDURE — 97161 PT EVAL LOW COMPLEX 20 MIN: CPT

## 2023-03-17 PROCEDURE — 1100000000 HC RM PRIVATE

## 2023-03-17 PROCEDURE — 6360000002 HC RX W HCPCS: Performed by: FAMILY MEDICINE

## 2023-03-17 PROCEDURE — 6370000000 HC RX 637 (ALT 250 FOR IP): Performed by: FAMILY MEDICINE

## 2023-03-17 PROCEDURE — 97116 GAIT TRAINING THERAPY: CPT

## 2023-03-17 PROCEDURE — 97110 THERAPEUTIC EXERCISES: CPT

## 2023-03-17 PROCEDURE — 97530 THERAPEUTIC ACTIVITIES: CPT

## 2023-03-17 RX ORDER — SODIUM CHLORIDE 9 MG/ML
INJECTION, SOLUTION INTRAVENOUS PRN
Status: DISCONTINUED | OUTPATIENT
Start: 2023-03-17 | End: 2023-03-17

## 2023-03-17 RX ADMIN — METOPROLOL TARTRATE 25 MG: 25 TABLET, FILM COATED ORAL at 21:11

## 2023-03-17 RX ADMIN — MAGNESIUM OXIDE TAB 400 MG (241.3 MG ELEMENTAL MG) 400 MG: 400 (241.3 MG) TAB at 21:12

## 2023-03-17 RX ADMIN — Medication 1000 UNITS: at 08:51

## 2023-03-17 RX ADMIN — METOPROLOL TARTRATE 25 MG: 25 TABLET, FILM COATED ORAL at 08:51

## 2023-03-17 RX ADMIN — KETOROLAC TROMETHAMINE 15 MG: 15 INJECTION, SOLUTION INTRAMUSCULAR; INTRAVENOUS at 04:45

## 2023-03-17 RX ADMIN — KETOROLAC TROMETHAMINE 15 MG: 15 INJECTION, SOLUTION INTRAMUSCULAR; INTRAVENOUS at 12:03

## 2023-03-17 RX ADMIN — LEVOTHYROXINE SODIUM 137.5 MCG: 100 TABLET ORAL at 06:40

## 2023-03-17 RX ADMIN — FUROSEMIDE 40 MG: 40 TABLET ORAL at 08:50

## 2023-03-17 RX ADMIN — CYANOCOBALAMIN TAB 1000 MCG 1000 MCG: 1000 TAB at 08:50

## 2023-03-17 RX ADMIN — KETOROLAC TROMETHAMINE 15 MG: 15 INJECTION, SOLUTION INTRAMUSCULAR; INTRAVENOUS at 19:41

## 2023-03-17 ASSESSMENT — PAIN DESCRIPTION - LOCATION
LOCATION: RIB CAGE
LOCATION: CHEST;RIB CAGE

## 2023-03-17 ASSESSMENT — PAIN DESCRIPTION - ORIENTATION
ORIENTATION: RIGHT

## 2023-03-17 ASSESSMENT — PAIN SCALES - GENERAL
PAINLEVEL_OUTOF10: 0
PAINLEVEL_OUTOF10: 10
PAINLEVEL_OUTOF10: 8
PAINLEVEL_OUTOF10: 5
PAINLEVEL_OUTOF10: 10
PAINLEVEL_OUTOF10: 10

## 2023-03-17 ASSESSMENT — PAIN DESCRIPTION - DESCRIPTORS
DESCRIPTORS: ACHING

## 2023-03-17 ASSESSMENT — PAIN SCALES - WONG BAKER: WONGBAKER_NUMERICALRESPONSE: 0

## 2023-03-17 NOTE — PLAN OF CARE
Problem: Pain  Goal: Verbalizes/displays adequate comfort level or baseline comfort level  3/17/2023 0709 by Khushi Leroy RN  Outcome: Progressing  3/16/2023 1835 by Mily Cruz RN  Outcome: Progressing     Problem: Safety - Adult  Goal: Free from fall injury  3/17/2023 0709 by Khushi Leroy RN  Outcome: Progressing  3/16/2023 1835 by Mily Cruz RN  Outcome: Progressing     Problem: ABCDS Injury Assessment  Goal: Absence of physical injury  3/17/2023 0709 by Khushi Leroy RN  Outcome: Progressing  3/16/2023 1835 by Mily Cruz RN  Outcome: Progressing

## 2023-03-18 PROCEDURE — 1100000000 HC RM PRIVATE

## 2023-03-18 PROCEDURE — 6360000002 HC RX W HCPCS: Performed by: FAMILY MEDICINE

## 2023-03-18 PROCEDURE — 6370000000 HC RX 637 (ALT 250 FOR IP): Performed by: FAMILY MEDICINE

## 2023-03-18 PROCEDURE — 97530 THERAPEUTIC ACTIVITIES: CPT

## 2023-03-18 PROCEDURE — 97166 OT EVAL MOD COMPLEX 45 MIN: CPT

## 2023-03-18 RX ADMIN — LEVOTHYROXINE SODIUM 137.5 MCG: 100 TABLET ORAL at 06:04

## 2023-03-18 RX ADMIN — METOPROLOL TARTRATE 25 MG: 25 TABLET, FILM COATED ORAL at 20:26

## 2023-03-18 RX ADMIN — Medication 1000 UNITS: at 08:59

## 2023-03-18 RX ADMIN — KETOROLAC TROMETHAMINE 15 MG: 15 INJECTION, SOLUTION INTRAMUSCULAR; INTRAVENOUS at 04:12

## 2023-03-18 RX ADMIN — KETOROLAC TROMETHAMINE 15 MG: 15 INJECTION, SOLUTION INTRAMUSCULAR; INTRAVENOUS at 12:45

## 2023-03-18 RX ADMIN — FUROSEMIDE 40 MG: 40 TABLET ORAL at 08:59

## 2023-03-18 RX ADMIN — METOPROLOL TARTRATE 25 MG: 25 TABLET, FILM COATED ORAL at 08:59

## 2023-03-18 RX ADMIN — MAGNESIUM OXIDE TAB 400 MG (241.3 MG ELEMENTAL MG) 400 MG: 400 (241.3 MG) TAB at 20:26

## 2023-03-18 RX ADMIN — CYANOCOBALAMIN TAB 1000 MCG 1000 MCG: 1000 TAB at 08:59

## 2023-03-18 RX ADMIN — KETOROLAC TROMETHAMINE 15 MG: 15 INJECTION, SOLUTION INTRAMUSCULAR; INTRAVENOUS at 23:17

## 2023-03-18 ASSESSMENT — PAIN DESCRIPTION - LOCATION
LOCATION: RIB CAGE
LOCATION: CHEST;RIB CAGE
LOCATION: BACK;CHEST;RIB CAGE
LOCATION: CHEST

## 2023-03-18 ASSESSMENT — PAIN DESCRIPTION - DESCRIPTORS
DESCRIPTORS: ACHING
DESCRIPTORS: SHARP;SHOOTING
DESCRIPTORS: SHARP;ACHING
DESCRIPTORS: ACHING

## 2023-03-18 ASSESSMENT — ENCOUNTER SYMPTOMS
SHORTNESS OF BREATH: 0
ABDOMINAL DISTENTION: 0
WHEEZING: 0
COUGH: 0
BACK PAIN: 0
VOMITING: 0
ABDOMINAL PAIN: 0
SINUS PRESSURE: 0
DIARRHEA: 0
NAUSEA: 0
CHEST TIGHTNESS: 0
CONSTIPATION: 0
BLOOD IN STOOL: 0
EYES NEGATIVE: 1
SORE THROAT: 0

## 2023-03-18 ASSESSMENT — PAIN SCALES - GENERAL
PAINLEVEL_OUTOF10: 10
PAINLEVEL_OUTOF10: 9

## 2023-03-18 ASSESSMENT — PAIN DESCRIPTION - ORIENTATION
ORIENTATION: RIGHT

## 2023-03-18 NOTE — PLAN OF CARE
Problem: Pain  Goal: Verbalizes/displays adequate comfort level or baseline comfort level  Outcome: Progressing  Flowsheets (Taken 3/17/2023 2250)  Verbalizes/displays adequate comfort level or baseline comfort level:   Encourage patient to monitor pain and request assistance   Assess pain using appropriate pain scale   Administer analgesics based on type and severity of pain and evaluate response   Implement non-pharmacological measures as appropriate and evaluate response   Notify Licensed Independent Practitioner if interventions unsuccessful or patient reports new pain     Problem: Safety - Adult  Goal: Free from fall injury  Outcome: Progressing  Flowsheets (Taken 3/17/2023 2250)  Free From Fall Injury: Instruct family/caregiver on patient safety     Problem: ABCDS Injury Assessment  Goal: Absence of physical injury  Outcome: Progressing  Flowsheets (Taken 3/17/2023 2250)  Absence of Physical Injury: Implement safety measures based on patient assessment

## 2023-03-19 LAB
ALBUMIN SERPL-MCNC: 2.7 G/DL (ref 3.4–5)
ALBUMIN/GLOB SERPL: 1 (ref 0.8–1.7)
ALP SERPL-CCNC: 85 U/L (ref 45–117)
ALT SERPL-CCNC: 17 U/L (ref 13–56)
ANION GAP SERPL CALC-SCNC: 5 MMOL/L (ref 3–18)
AST SERPL-CCNC: 17 U/L (ref 10–38)
BASOPHILS # BLD: 0 K/UL (ref 0–0.1)
BASOPHILS NFR BLD: 0 % (ref 0–2)
BILIRUB SERPL-MCNC: 0.5 MG/DL (ref 0.2–1)
BUN SERPL-MCNC: 32 MG/DL (ref 7–18)
BUN/CREAT SERPL: 35 (ref 12–20)
CALCIUM SERPL-MCNC: 8.2 MG/DL (ref 8.5–10.1)
CHLORIDE SERPL-SCNC: 102 MMOL/L (ref 100–111)
CO2 SERPL-SCNC: 30 MMOL/L (ref 21–32)
CREAT SERPL-MCNC: 0.91 MG/DL (ref 0.6–1.3)
DIFFERENTIAL METHOD BLD: ABNORMAL
EOSINOPHIL # BLD: 0.3 K/UL (ref 0–0.4)
EOSINOPHIL NFR BLD: 5 % (ref 0–5)
ERYTHROCYTE [DISTWIDTH] IN BLOOD BY AUTOMATED COUNT: 14.5 % (ref 11.6–14.5)
GLOBULIN SER CALC-MCNC: 2.8 G/DL (ref 2–4)
GLUCOSE SERPL-MCNC: 91 MG/DL (ref 74–99)
HCT VFR BLD AUTO: 35.1 % (ref 35–45)
HGB BLD-MCNC: 11.2 G/DL (ref 12–16)
IMM GRANULOCYTES # BLD AUTO: 0.1 K/UL (ref 0–0.04)
IMM GRANULOCYTES NFR BLD AUTO: 1 % (ref 0–0.5)
LYMPHOCYTES # BLD: 1.2 K/UL (ref 0.9–3.6)
LYMPHOCYTES NFR BLD: 18 % (ref 21–52)
MAGNESIUM SERPL-MCNC: 2.3 MG/DL (ref 1.6–2.6)
MCH RBC QN AUTO: 30.8 PG (ref 24–34)
MCHC RBC AUTO-ENTMCNC: 31.9 G/DL (ref 31–37)
MCV RBC AUTO: 96.4 FL (ref 78–100)
MONOCYTES # BLD: 0.9 K/UL (ref 0.05–1.2)
MONOCYTES NFR BLD: 13 % (ref 3–10)
NEUTS SEG # BLD: 4 K/UL (ref 1.8–8)
NEUTS SEG NFR BLD: 63 % (ref 40–73)
NRBC # BLD: 0 K/UL (ref 0–0.01)
NRBC BLD-RTO: 0 PER 100 WBC
PLATELET # BLD AUTO: 258 K/UL (ref 135–420)
PMV BLD AUTO: 10.4 FL (ref 9.2–11.8)
POTASSIUM SERPL-SCNC: 4.5 MMOL/L (ref 3.5–5.5)
PROT SERPL-MCNC: 5.5 G/DL (ref 6.4–8.2)
RBC # BLD AUTO: 3.64 M/UL (ref 4.2–5.3)
SODIUM SERPL-SCNC: 137 MMOL/L (ref 136–145)
WBC # BLD AUTO: 6.4 K/UL (ref 4.6–13.2)

## 2023-03-19 PROCEDURE — 83735 ASSAY OF MAGNESIUM: CPT

## 2023-03-19 PROCEDURE — 6370000000 HC RX 637 (ALT 250 FOR IP): Performed by: FAMILY MEDICINE

## 2023-03-19 PROCEDURE — 85025 COMPLETE CBC W/AUTO DIFF WBC: CPT

## 2023-03-19 PROCEDURE — 6360000002 HC RX W HCPCS: Performed by: FAMILY MEDICINE

## 2023-03-19 PROCEDURE — 80053 COMPREHEN METABOLIC PANEL: CPT

## 2023-03-19 PROCEDURE — 1100000000 HC RM PRIVATE

## 2023-03-19 PROCEDURE — 36415 COLL VENOUS BLD VENIPUNCTURE: CPT

## 2023-03-19 RX ADMIN — LEVOTHYROXINE SODIUM 137.5 MCG: 100 TABLET ORAL at 06:13

## 2023-03-19 RX ADMIN — MAGNESIUM OXIDE TAB 400 MG (241.3 MG ELEMENTAL MG) 400 MG: 400 (241.3 MG) TAB at 20:04

## 2023-03-19 RX ADMIN — METOPROLOL TARTRATE 25 MG: 25 TABLET, FILM COATED ORAL at 09:06

## 2023-03-19 RX ADMIN — KETOROLAC TROMETHAMINE 15 MG: 15 INJECTION, SOLUTION INTRAMUSCULAR; INTRAVENOUS at 20:03

## 2023-03-19 RX ADMIN — METOPROLOL TARTRATE 25 MG: 25 TABLET, FILM COATED ORAL at 20:03

## 2023-03-19 RX ADMIN — CYANOCOBALAMIN TAB 1000 MCG 1000 MCG: 1000 TAB at 09:06

## 2023-03-19 RX ADMIN — KETOROLAC TROMETHAMINE 15 MG: 15 INJECTION, SOLUTION INTRAMUSCULAR; INTRAVENOUS at 12:16

## 2023-03-19 RX ADMIN — Medication 1000 UNITS: at 09:06

## 2023-03-19 RX ADMIN — FUROSEMIDE 40 MG: 40 TABLET ORAL at 09:06

## 2023-03-19 RX ADMIN — KETOROLAC TROMETHAMINE 15 MG: 15 INJECTION, SOLUTION INTRAMUSCULAR; INTRAVENOUS at 06:19

## 2023-03-19 ASSESSMENT — ENCOUNTER SYMPTOMS
ABDOMINAL DISTENTION: 0
EYES NEGATIVE: 1
ABDOMINAL PAIN: 0
CHEST TIGHTNESS: 0
VOMITING: 0
WHEEZING: 0
BACK PAIN: 0
SINUS PRESSURE: 0
BLOOD IN STOOL: 0
DIARRHEA: 0
COUGH: 0
CONSTIPATION: 0
SORE THROAT: 0
SHORTNESS OF BREATH: 0
NAUSEA: 0

## 2023-03-19 ASSESSMENT — PAIN SCALES - GENERAL
PAINLEVEL_OUTOF10: 9
PAINLEVEL_OUTOF10: 5
PAINLEVEL_OUTOF10: 10

## 2023-03-19 ASSESSMENT — PAIN DESCRIPTION - DESCRIPTORS
DESCRIPTORS: SHARP
DESCRIPTORS: ACHING

## 2023-03-19 ASSESSMENT — PAIN DESCRIPTION - LOCATION
LOCATION: CHEST;RIB CAGE
LOCATION: RIB CAGE

## 2023-03-19 NOTE — PLAN OF CARE
Problem: Pain  Goal: Verbalizes/displays adequate comfort level or baseline comfort level  Outcome: Progressing     Problem: Safety - Adult  Goal: Free from fall injury  Outcome: Progressing  Flowsheets (Taken 3/18/2023 2317)  Free From Fall Injury: Instruct family/caregiver on patient safety     Problem: Skin/Tissue Integrity  Goal: Absence of new skin breakdown  Description: 1. Monitor for areas of redness and/or skin breakdown  2. Assess vascular access sites hourly  3. Every 4-6 hours minimum:  Change oxygen saturation probe site  4. Every 4-6 hours:  If on nasal continuous positive airway pressure, respiratory therapy assess nares and determine need for appliance change or resting period.   Outcome: Progressing     Problem: ABCDS Injury Assessment  Goal: Absence of physical injury  Recent Flowsheet Documentation  Taken 3/18/2023 2317 by Almita Perea RN  Absence of Physical Injury: Implement safety measures based on patient assessment

## 2023-03-20 PROCEDURE — 6370000000 HC RX 637 (ALT 250 FOR IP): Performed by: FAMILY MEDICINE

## 2023-03-20 PROCEDURE — 1100000000 HC RM PRIVATE

## 2023-03-20 PROCEDURE — 6360000002 HC RX W HCPCS: Performed by: HOSPITALIST

## 2023-03-20 PROCEDURE — 97116 GAIT TRAINING THERAPY: CPT

## 2023-03-20 PROCEDURE — 6360000002 HC RX W HCPCS: Performed by: FAMILY MEDICINE

## 2023-03-20 PROCEDURE — 97110 THERAPEUTIC EXERCISES: CPT

## 2023-03-20 RX ORDER — ENOXAPARIN SODIUM 100 MG/ML
40 INJECTION SUBCUTANEOUS DAILY
Status: DISCONTINUED | OUTPATIENT
Start: 2023-03-20 | End: 2023-03-21 | Stop reason: DRUGHIGH

## 2023-03-20 RX ADMIN — MAGNESIUM OXIDE TAB 400 MG (241.3 MG ELEMENTAL MG) 400 MG: 400 (241.3 MG) TAB at 20:09

## 2023-03-20 RX ADMIN — LEVOTHYROXINE SODIUM 137.5 MCG: 100 TABLET ORAL at 08:15

## 2023-03-20 RX ADMIN — FUROSEMIDE 40 MG: 40 TABLET ORAL at 08:16

## 2023-03-20 RX ADMIN — KETOROLAC TROMETHAMINE 15 MG: 15 INJECTION, SOLUTION INTRAMUSCULAR; INTRAVENOUS at 10:38

## 2023-03-20 RX ADMIN — CYANOCOBALAMIN TAB 1000 MCG 1000 MCG: 1000 TAB at 08:16

## 2023-03-20 RX ADMIN — KETOROLAC TROMETHAMINE 15 MG: 15 INJECTION, SOLUTION INTRAMUSCULAR; INTRAVENOUS at 20:10

## 2023-03-20 RX ADMIN — METOPROLOL TARTRATE 12.5 MG: 25 TABLET, FILM COATED ORAL at 08:16

## 2023-03-20 RX ADMIN — ENOXAPARIN SODIUM 40 MG: 100 INJECTION SUBCUTANEOUS at 13:43

## 2023-03-20 RX ADMIN — Medication 1000 UNITS: at 08:15

## 2023-03-20 RX ADMIN — KETOROLAC TROMETHAMINE 15 MG: 15 INJECTION, SOLUTION INTRAMUSCULAR; INTRAVENOUS at 03:43

## 2023-03-20 RX ADMIN — METOPROLOL TARTRATE 12.5 MG: 25 TABLET, FILM COATED ORAL at 20:09

## 2023-03-20 ASSESSMENT — PAIN DESCRIPTION - LOCATION: LOCATION: RIB CAGE

## 2023-03-20 ASSESSMENT — PAIN SCALES - GENERAL
PAINLEVEL_OUTOF10: 10
PAINLEVEL_OUTOF10: 7
PAINLEVEL_OUTOF10: 7

## 2023-03-20 ASSESSMENT — PAIN DESCRIPTION - ORIENTATION: ORIENTATION: RIGHT

## 2023-03-20 ASSESSMENT — PAIN DESCRIPTION - DESCRIPTORS: DESCRIPTORS: ACHING

## 2023-03-20 NOTE — PLAN OF CARE
Problem: Pain  Goal: Verbalizes/displays adequate comfort level or baseline comfort level  Outcome: Progressing     Problem: Safety - Adult  Goal: Free from fall injury  Outcome: Progressing  Flowsheets (Taken 3/19/2023 1911)  Free From Fall Injury:   Instruct family/caregiver on patient safety   Based on caregiver fall risk screen, instruct family/caregiver to ask for assistance with transferring infant if caregiver noted to have fall risk factors     Problem: ABCDS Injury Assessment  Goal: Absence of physical injury  Outcome: Progressing  Flowsheets (Taken 3/19/2023 1911)  Absence of Physical Injury: Implement safety measures based on patient assessment     Problem: Skin/Tissue Integrity  Goal: Absence of new skin breakdown  Description: 1. Monitor for areas of redness and/or skin breakdown  2. Assess vascular access sites hourly  3. Every 4-6 hours minimum:  Change oxygen saturation probe site  4. Every 4-6 hours:  If on nasal continuous positive airway pressure, respiratory therapy assess nares and determine need for appliance change or resting period.   Outcome: Progressing

## 2023-03-20 NOTE — PLAN OF CARE
1911 Assumed pt care. Pt is A&O x4, slight hard of hearing without aids. Lungs are diminished bilaterally in RA. Tele box #46 shows NSR with occasional BBBs at HR 76. +2 pitting edema BLE without pain during palpation. Purewibroadbandchoices system in place, no output yet. Generalized weakness thoughout. Placed bed in lowest position with alarm activated, call bell left within reach. 2003 Pt called for pain medicine, rated 10 out of 10. IV toradol given as requested. No other requests. 2345 No change in pt status. 0345 No change in pt status. IV toradol given for pain rated 10 out of 10. Very minimal output noted in purewick cannister, pt's bedpad is dry. Encouraged fluids, pt agreed. Will continue current plan of care. 6612 Pt's HR on tele monitor occasionally dropping in the 30s-40s, but recovers back in the 70s. Rechecked BP at 175/48 with c/o headache. Dr. Justin Gutierrez placed new order to change metoprolol dosing.

## 2023-03-21 ENCOUNTER — APPOINTMENT (OUTPATIENT)
Facility: HOSPITAL | Age: 88
DRG: 184 | End: 2023-03-21
Payer: MEDICARE

## 2023-03-21 LAB — CREAT SERPL-MCNC: 1.04 MG/DL (ref 0.6–1.3)

## 2023-03-21 PROCEDURE — 6360000002 HC RX W HCPCS: Performed by: FAMILY MEDICINE

## 2023-03-21 PROCEDURE — 36415 COLL VENOUS BLD VENIPUNCTURE: CPT

## 2023-03-21 PROCEDURE — 6370000000 HC RX 637 (ALT 250 FOR IP): Performed by: FAMILY MEDICINE

## 2023-03-21 PROCEDURE — 82565 ASSAY OF CREATININE: CPT

## 2023-03-21 PROCEDURE — 6360000002 HC RX W HCPCS: Performed by: HOSPITALIST

## 2023-03-21 PROCEDURE — 1100000000 HC RM PRIVATE

## 2023-03-21 PROCEDURE — 71045 X-RAY EXAM CHEST 1 VIEW: CPT

## 2023-03-21 PROCEDURE — 97116 GAIT TRAINING THERAPY: CPT

## 2023-03-21 RX ORDER — ENOXAPARIN SODIUM 100 MG/ML
30 INJECTION SUBCUTANEOUS DAILY
Status: DISCONTINUED | OUTPATIENT
Start: 2023-03-22 | End: 2023-03-22 | Stop reason: HOSPADM

## 2023-03-21 RX ADMIN — CYANOCOBALAMIN TAB 1000 MCG 1000 MCG: 1000 TAB at 08:50

## 2023-03-21 RX ADMIN — METOPROLOL TARTRATE 12.5 MG: 25 TABLET, FILM COATED ORAL at 08:50

## 2023-03-21 RX ADMIN — METOPROLOL TARTRATE 12.5 MG: 25 TABLET, FILM COATED ORAL at 20:06

## 2023-03-21 RX ADMIN — FUROSEMIDE 40 MG: 40 TABLET ORAL at 08:50

## 2023-03-21 RX ADMIN — KETOROLAC TROMETHAMINE 15 MG: 15 INJECTION, SOLUTION INTRAMUSCULAR; INTRAVENOUS at 05:30

## 2023-03-21 RX ADMIN — LEVOTHYROXINE SODIUM 137.5 MCG: 100 TABLET ORAL at 05:32

## 2023-03-21 RX ADMIN — MAGNESIUM OXIDE TAB 400 MG (241.3 MG ELEMENTAL MG) 400 MG: 400 (241.3 MG) TAB at 20:05

## 2023-03-21 RX ADMIN — ENOXAPARIN SODIUM 40 MG: 100 INJECTION SUBCUTANEOUS at 11:47

## 2023-03-21 RX ADMIN — Medication 1000 UNITS: at 08:51

## 2023-03-22 VITALS
HEIGHT: 60 IN | RESPIRATION RATE: 18 BRPM | SYSTOLIC BLOOD PRESSURE: 128 MMHG | BODY MASS INDEX: 31.77 KG/M2 | OXYGEN SATURATION: 97 % | TEMPERATURE: 98.7 F | WEIGHT: 161.82 LBS | HEART RATE: 71 BPM | DIASTOLIC BLOOD PRESSURE: 82 MMHG

## 2023-03-22 LAB
SARS-COV-2 RDRP RESP QL NAA+PROBE: NOT DETECTED
SOURCE: NORMAL

## 2023-03-22 PROCEDURE — 6370000000 HC RX 637 (ALT 250 FOR IP): Performed by: FAMILY MEDICINE

## 2023-03-22 PROCEDURE — 87635 SARS-COV-2 COVID-19 AMP PRB: CPT

## 2023-03-22 RX ORDER — LANOLIN ALCOHOL/MO/W.PET/CERES
400 CREAM (GRAM) TOPICAL NIGHTLY
Qty: 30 TABLET | Refills: 0
Start: 2023-03-22

## 2023-03-22 RX ORDER — CHOLECALCIFEROL (VITAMIN D3) 25 MCG
1000 TABLET ORAL DAILY
Qty: 60 TABLET | Refills: 0
Start: 2023-03-22

## 2023-03-22 RX ORDER — TRAMADOL HYDROCHLORIDE 50 MG/1
50 TABLET ORAL EVERY 6 HOURS PRN
Qty: 10 TABLET | Refills: 0 | Status: SHIPPED | OUTPATIENT
Start: 2023-03-22 | End: 2023-03-27

## 2023-03-22 RX ORDER — FUROSEMIDE 40 MG/1
40 TABLET ORAL DAILY
Qty: 60 TABLET | Refills: 0
Start: 2023-03-22

## 2023-03-22 RX ADMIN — LEVOTHYROXINE SODIUM 137.5 MCG: 100 TABLET ORAL at 05:01

## 2023-03-22 RX ADMIN — METOPROLOL TARTRATE 12.5 MG: 25 TABLET, FILM COATED ORAL at 09:03

## 2023-03-22 RX ADMIN — CYANOCOBALAMIN TAB 1000 MCG 1000 MCG: 1000 TAB at 09:03

## 2023-03-22 RX ADMIN — Medication 1000 UNITS: at 09:04

## 2023-03-22 RX ADMIN — ACETAMINOPHEN 1000 MG: 500 TABLET ORAL at 12:52

## 2023-03-22 RX ADMIN — FUROSEMIDE 40 MG: 40 TABLET ORAL at 09:04

## 2023-03-22 ASSESSMENT — PAIN DESCRIPTION - LOCATION: LOCATION: RIB CAGE

## 2023-03-22 ASSESSMENT — PAIN SCALES - GENERAL: PAINLEVEL_OUTOF10: 5

## 2023-03-22 NOTE — DISCHARGE SUMMARY
temperature 98.4, respiratory rate 18, pulse 75, blood pressure 144/53, SAO2 is 97%. She is not requiring oxygen. DISCHARGE MEDICATIONS:  1. Tramadol 50 mg q.6-8 h. as needed for severe pain. 2.  Continue Tylenol 650 mg q.6 h. for mild-to-moderate pain. 3.  Continue vitamin B12 1000 mcg daily. 4.  Lasix 40 mg daily. 5.  Mag-Ox 400 mg at night. 6.  Lopressor 12.5 mg twice daily. 7.  Vitamin D 1 tablet daily. 8.  Calcium carbonate 1 tablet daily. 9.  Synthroid 100 mcg daily. 10.  Prilosec 20 mg daily. She is stable for release from the hospital today for skilled nursing facility placement. The family is in agreement and happy with this plan. Thirty five minutes discharge time.       Donnie Vo MD      [id number]  D:  03/22/2023 10:39  T:  03/22/2023 11:39  JOB #:  [id number]  CC:  Jesus Leblanc DO

## 2023-03-22 NOTE — PROGRESS NOTES
conducted an initial consultation and spiritual assessment for Zaida Ni, who is a 80 y. o.,female. The reason the Patient came to the hospital is:   Patient Active Problem List    Diagnosis Date Noted    Multiple rib fractures 03/15/2023    Chronic congestive heart failure ([facility] 03/15/2023    Chronic atrial fibrillation ([facility] [address] 03/15/2023    Multiple falls 03/15/2023    Frail elderly 03/15/2023    ACP (advance care planning) 03/15/2023    Acute exacerbation of CHF (congestive heart failure) ([facility] [address] [facility] 01/02/2023    HTN (hypertension) 01/02/2023    Hypothyroidism 01/02/2023    New onset atrial fibrillation ([facility] [address] 01/02/2023    Aortic stenosis 01/02/2023    Hypomagnesemia 01/02/2023    Rib contusion, right, initial encounter 09/19/2020    Right shoulder injury, initial encounter 09/19/2020    Fall 09/19/2020     A real sweet lady with her two children by the bedside and very grateful for what the   staff is doing for her to feel better. She kept apologizing that her fall was not anybody's fault. The  gave her Spiritual material and prayed with her for her and the family. Initiated a relationship of care and support. Explored issues of grace, belief, spirituality and Voodoo/ritual needs. Listened empathically. Provided information about Spiritual Care Services. Offered prayer and assurance of continued prayers on patients behalf. Chart reviewed. Patient shared information about his/her medical narrative, spiritual journey and beliefs. Patient processed feelings about current hospitalization. Patient expressed gratitude for Spiritual Care visit. Chaplains will continue to follow and will provide pastoral care as needed or requested.  recommends bedside caregivers page  on duty if patient shows signs of acute spiritual or emotional distress.       Sister Amita Mcneil, [place], [address]  [phone]
0130 Dr. Nick Mejia paged for diet order for patient. 80 Dr. Nick Mejia paged for patient code status. 3719 [facility] Dr. Nick Mejia not going to put in code status until she rounds on patient.
12:30pm: Plan to discharge to SNF, report called, instructions reviewed ,questions answered, wheelchair transport to main entrance provided. Daughter is transporting in her vehicle.
1330 Dr. Rebecca Regan pagemaral for pain medicine. 1440 Dr. Rebecca Regan pagemaral again.
19:35 Assessment completed. Lungs are clear bilat. Resting quietly in bed with the TV on. Voiding per BSC w/o difficulty. 22:55 Shift assessment completed. See nsg flow sheet for details. 02:55 Reassessed with 0 changes noted. Resting quietly in bed with eyes closed between cares. 07:20 Verbal shift change report given to Denisa Gamez RN (oncoming nurse) by Kedar Saleh RN (offgoing nurse). Report included the following information Nurse Handoff Report.
20:25 Assessment completed. Lungs are clear bilat. Resting quietly in bed. 22:40 Shift assessment completed. See ns flow sheet for details. 03:00 Reassessed with 0 changes noted. Resting quietly in bed with eyes closed between cares. 07:15 Bedside and Verbal shift change report given to Girma (oncoming nurse) by Neville Santos RN (offgoing nurse). Report included the following information Nurse Handoff Report.
Ambulated to bathroom to have a BM. Patient tolerated well, using a walker and walking slowly.
Bedpan for void
Bedside and Verbal shift change report given to Lamberto Alarcon RN (oncoming nurse) by .jose de jesus Stein RN (offgoing nurse). Report included the following information Nurse Handoff Report, Intake/Output, MAR, and Recent Results.
D/C Plan: SNF    CM met with pt and her daughter, Neelam Meyer ([phone]; [phone]), at bedside to discuss care transition. Pt /family now have concerns about returning to the [place] as pt is not able to perform her ADLs independently and is requiring assistance. CM discussed SNF and pt/family are in agreement. CM provided pt/family with a list of area SNF to review. Pt/family would like to have clinical information sent to [facility]. CM requested additional choices for placement. Pt/family would like to continue to review the list provided. Anticipate pt will transition to SNF once an accepting facility has been identified and insurance auth has been obtained. CM to continue to follow and assist.  Anticipate pt will remain inpt through the weekend.
D/C Plan: SNF vs return to [facility] with [facility] pending clinical progression     CM has been notified [facility] will be coming to do an onsite assessment to see if they are able to meet pt's on going needs. Clinical information has been sent to [facility] for review in the event pt is unable to return to [facility].   CM to continue to follow and assist.
D/C Plan: SNF vs return to [facility] with [place] pending clinical progression      CM met with pt and her daughter, Mitchel Smith ([phone]; [phone]), at bedside to discuss care transition. Family would like to have [facility] added to the list. [facility] was to conduct an onsite assessment this morning, however, they did not come. CM will contact Sherry to ask when they will be conducting an onsite assessment. Anticipate pt will transition to SNF vs [facility] with HH pending clinical progression.   CM to continue to follow and assist.
Hospitalist Progress Note    Patient: Dexter Martinez MRN: [id number]  CSN: [id number]    YOB: 1927  Age: 80 y.o. Sex: female    DOA: 3/15/2023 LOS:  LOS: 6 days          Chief Complaint:    Rib pain      Assessment/Plan        80-year-old female history of congestive heart failure, chronic atrial fibrillation, aortic stenosis and loss of balance and fell today. Admitted for multiple rib fractures. Multiple rib fractures due to traumatic fall   aortic stenosis  anemia due to low iron and B12 levels  Chronic congestive heart failure  Chronic atrial fibrillation     Pain control  Incentive spirometry  PT OT consults    DVT prophylaxis    Prefers SNF        Disposition :SNF transport at noon /[facility] Problems    Diagnosis     Multiple rib fractures [S22.49XA]      Priority: Medium    Chronic congestive heart failure ([address] [I50.9]      Priority: Medium    Chronic atrial fibrillation (HCC) [I48.20]      Priority: Medium    Multiple falls [R29.6]      Priority: Medium    Frail elderly [R54]      Priority: Medium    ACP (advance care planning) [Z71.89]      Priority: Medium    Hypothyroidism [E03.9]     HTN (hypertension) [I10]        Subjective:    No SOB  pain right chest wall with deep breaths  States she cant do things for herself like before due to pain and weakness from her injuries    Review of systems:    Constitutional: denies fevers  Respiratory: denies SOB, cough  Cardiovascular: denies chest pain, palpitations  Gastrointestinal: denies nausea, vomiting, diarrhea      Vital signs/Intake and Output:  Visit Vitals  BP (!) 132/96   Pulse 79   Temp 98.2 °F (36.8 °C) (Oral)   Resp 18   Ht 5' (1.524 m)   Wt 161 lb 13.1 oz (73.4 kg)   SpO2 99%   BMI 31.60 kg/m²     Current Shift:  No intake/output data recorded.   Last three shifts:  03/19 1901 - 03/21 0700  In: -   Out: 1500 [Urine:1500]    Exam:    General: elderly thin WF, alert, NAD, OX3  CVS:Regular rate and
Hospitalist Progress Note    Patient: Krzysztof Lockhart MRN: [id number]  CSN: [id number]    YOB: 1927  Age: 80 y.o. Sex: female    DOA: 3/15/2023 LOS:  LOS: 5 days          Chief Complaint:    Rib pain      Assessment/Plan        80-year-old female history of congestive heart failure, chronic atrial fibrillation, aortic stenosis and loss of balance and fell today. Admitted for multiple rib fractures. Multiple rib fractures due to traumatic fall   aortic stenosis  anemia due to low iron and B12 levels  Chronic congestive heart failure  Chronic atrial fibrillation     Pain control  Incentive spirometry  PT OT consults    DVT prophylaxis    jail versus SNF depending on her ability and progress/safety        Disposition :  Active Hospital Problems    Diagnosis     Multiple rib fractures [S22.49XA]      Priority: Medium    Chronic congestive heart failure ([address] [I50.9]      Priority: Medium    Chronic atrial fibrillation (HCC) [I48.20]      Priority: Medium    Multiple falls [R29.6]      Priority: Medium    Frail elderly [R54]      Priority: Medium    ACP (advance care planning) [Z71.89]      Priority: Medium    Hypothyroidism [E03.9]     HTN (hypertension) [I10]        Subjective:    No SOB  pain right chest wall with deep breaths  States she cant do things for herself like before due to pain and weakness from her injuries    Review of systems:    Constitutional: denies fevers  Respiratory: denies SOB, cough  Cardiovascular: denies chest pain, palpitations  Gastrointestinal: denies nausea, vomiting, diarrhea      Vital signs/Intake and Output:  Visit Vitals  BP (!) 147/71   Pulse 73   Temp 97.9 °F (36.6 °C) (Oral)   Resp 18   Ht 5' (1.524 m)   Wt 161 lb 13.1 oz (73.4 kg)   SpO2 98%   BMI 31.60 kg/m²     Current Shift:  No intake/output data recorded.   Last three shifts:  03/18 1901 - 03/20 0700  In: 480 [P.O.:480]  Out: 600 [Urine:600]    Exam:    General: elderly thin WF, alert, NAD,
Patient rated pain 8 out of 10. Asked patient if she wanted tylenol and patient refused.
Pharmacy Renal Dosing Services:    Enoxaparin has been renally dose adjusted based on [facility] approved protocols. Dose adjusted to:  Enoxaparin 30 mg SQ daily  Indication: DVT prophylaxis    Previous Regimen Enoxaparin 40 mg SQ daily   Serum Creatinine No results found for: MANDIE, CREA, CREAPOC   Creatinine Clearance Estimated Creatinine Clearance: 29 mL/min (based on SCr of 1.04 mg/dL). BUN Lab Results   Component Value Date/Time    BUN 32 03/19/2023 05:21 AM         Pharmacy to continue to monitor daily.    Marya Villalba, PharmD  [phone]
Physical Therapy    1042: Pt family in bathing pt, will follow up again for PT.    1200: OT currently in with pt, will follow up as schedule permits.
Physical Therapy Goals:  Initiated 3/17/2023 to be met within 7-10 days. Short Term Goals  Time Frame for Short Term Goals: 7 days  Short Term Goal 1: Patient will perform bed mobility with SBA  Short Term Goal 2: Patient will perform stand transfers with SBA  Short Term Goal 3: Patient will ambulate 100 ft with RW and SBA  PHYSICAL THERAPY EVALUATION    Patient: Tiburcio Kelly (35 y.o. female)  Date: 3/17/2023  Primary Diagnosis: Closed fracture of one rib, unspecified laterality, sequela [S22.39XS]  Precautions: Fall Risk  PLOF: Independent ambulation without AD    ASSESSMENT :  Assessment: Patient presents with decreased lower extremity strength, impaired functional mobility, decreased gait quality and endurance. Pt performs stand transfers from chair with CG/SBA. Pt ambulated with RW and CG-SBA. Pt with no overt loss of balance. Pt does have pain throughout session however able to tolerate mobility well. Pt performed seated ther ex for LE strengthening. Pt has a lift chair at home which she can sleeping to assist with difficulty trnasitioning in/out of bed. Pt's present at end of session and states she plans to try to increase staff support at the independent living. Will continue to follow patient for PT to address deficits and progress mobility as tolerated. DEFICITS/IMPAIRMENTS:    , Body Structures, Functions, Activity Limitations Requiring Skilled Therapeutic Intervention: Decreased ROM; Decreased balance;Decreased strength;Decreased functional mobility ; Decreased posture; Increased pain    Patient will benefit from skilled intervention to address the above impairments. Patient's rehabilitation potential is considered to be Therapy Prognosis: Good.   Factors which may influence rehabilitation potential include:   []         None noted  []         Mental ability/status  [x]         Medical condition  []         Home/family situation and support systems  []         Safety awareness  []         Pain
Physical Therapy Goals:  Initiated 3/21/2023 to be met within 7-10 days. Short Term Goals  Time Frame for Short Term Goals: 7 days  Short Term Goal 1: Patient will perform bed mobility with SBA  Short Term Goal 2: Patient will perform stand transfers with SBA  Short Term Goal 3: Patient will ambulate 100 ft with RW and SBA    []  Patient has met MD mobilization halle for d/c home   []  Recommend HH with 24 hour adult care   [x]  Benefit from additional acute PT session to address:  rehab placement      PHYSICAL THERAPY TREATMENT    Patient: Berna Woods (89 y.o. female)  Date: 3/21/2023  Diagnosis: Closed fracture of one rib, unspecified laterality, sequela [S22.39XS] Multiple rib fractures    Precautions: Fall Risk,  PLOF:     ASSESSMENT:  Assessment  Assessment: Pt sitting in chair upon arrival.  SBA for sit to stand with increased time to carry out. Ambulated 160ft with RW, reciprocal gt pattern, decreased stride, decreased pace, no LOB or path deviations. Returned to room and Lily into bed. Activity Tolerance: Patient tolerated treatment well  Discharge Recommendations: Subacute/Skilled Nursing Facility    Progression toward goals:   []      Improving appropriately and progressing toward goals  [x]      Improving slowly and progressing toward goals  []      Not making progress toward goals and plan of care will be adjusted     PLAN:  Patient continues to benefit from skilled intervention to address the above impairments. Continue treatment per established plan of care. Further Equipment Recommendations for Discharge:  Discharge Recommendation: Discharge Recommendations: [address]: 13/20    This [facility] score should be considered in conjunction with interdisciplinary team recommendations to determine the most appropriate discharge setting. Patient's social support, diagnosis, medical stability, and prior level of function should also be taken into consideration.
Physician Progress Note      PATIENT:               Kacie Persaud  CSN #:                  [id number]  :                       3/22/1927  ADMIT DATE:       3/15/2023 11:23 AM  DISCH DATE:  Torie Luque  PROVIDER #:        Chito Sarabia MD          QUERY TEXT:    Pt admitted with rib fractures and has CHF documented. If possible, please   document in progress notes and discharge summary further specificity regarding   the type and acuity of CHF:    The medical record reflects the following:    Risk Factors: CHF  Clinical Indicators: History of CHF. 3/15/ H&P: documented \"History of congestive heart failure\". ECHO on 1/3/23/  Left Ventricle: Normal left ventricular systolic function   with a visually estimated EF of 60 - 65%. Left ventricle size is normal.   Mildly increased wall thickness. Normal wall motion. Treatment: PO Lasix 40mg daily    Thank You  Matilda Garcia RN, CDI, CRCR  Options provided:  -- Chronic Systolic CHF/HFrEF  -- Chronic Diastolic CHF/HFpEF  -- Chronic Systolic and Diastolic CHF  -- Other - I will add my own diagnosis  -- Disagree - Not applicable / Not valid  -- Disagree - Clinically unable to determine / Unknown  -- Refer to Clinical Documentation Reviewer    PROVIDER RESPONSE TEXT:    This patient has chronic diastolic CHF/HFpEF.     Query created by: Francois Mcardle on 3/17/2023 12:56 PM      Electronically signed by:  Chito Sarabia MD 3/20/2023 4:48 PM
Provider notified that telemetry tech stated patient had multiple attempts of andrew down to the 30's then jumping back to NSR in the 70s. No orders given at this time. However will continue to monitor.
Report received from [facility]. Patient arrived to unit 1855. Daughter at bedside. Patient placed on tele monitor. Report given to o- coming nurse and receiving nurse to continue admission.
Resting on stretcher in NAD. Family remains @ bedside.  Awaiting dispo
TRANSFER - OUT REPORT:    Verbal report given to [address] [place] [address]  being transferred to  for routine progression of patient care       Report consisted of patient's Situation, Background, Assessment and   Recommendations(SBAR). Information from the following report(s) ED Encounter Summary was reviewed with the receiving nurse. Nenzel Assessment: Presents to emergency department  because of falls (Syncope, seizure, or loss of consciousness): Yes, Age > 79: Yes, Altered Mental Status, Intoxication with alcohol or substance confusion (Disorientation, impaired judgment, poor safety awaremess, or inability to follow instructions): No, Impaired Mobility: Ambulates or transfers with assistive devices or assistance; Unable to ambulate or transer.: Yes  Lines:   Peripheral IV 03/15/23 Left Hand (Active)   Site Assessment Clean, dry & intact 03/15/23 1332   Line Status Blood return noted; Flushed 03/15/23 1332   Phlebitis Assessment No symptoms 03/15/23 1332   Infiltration Assessment 0 03/15/23 1332        Opportunity for questions and clarification was provided.       Patient transported with:  [employer]
Exam:    General: Well developed, alert, NAD, OX3  Head/Neck: NCAT, supple, No masses, No lymphadenopathy  CVS:Regular rate and rhythm, no M/R/G, S1/S2 heard, no thrill  Lungs:Clear to auscultation bilaterally, no wheezes, rhonchi, or rales  Abdomen: Soft, Nontender, No distention, Normal Bowel sounds, No hepatomegaly  Extremities: No C/C/E, pulses palpable 2+  Skin:normal texture and turgor, no rashes, no lesions  Neuro:grossly normal , follows commands  Psych:appropriate    Labs: Results:       Chemistry Recent Labs     03/15/23  1931   GLUCOSE 156*      K 3.9      CO2 25   BUN 23*   CREATININE 0.85   CALCIUM 8.7   BILITOT 0.4   AST 30   ALT 23   ALKPHOS 98   PROT 6.6   GLOB 3.2   LABGLOM >60      CBC w/Diff Recent Labs     03/15/23  1318   WBC 10.7   RBC 3.87*   HGB 12.4   HCT 37.7      LYMPHOPCT 7*      Cardiac Enzymes No results for input(s): CKTOTAL, CKMB, CKMBINDEX, TROPONINI, ALEKS in the last 72 hours. Coagulation Recent Labs     03/15/23  1318   PROTIME 12.7   INR 0.9       Lipid Panel No results found for: CHOL, TRIG, HDL, LDLCHOLESTEROL, LDLCALC, LABVLDL, VLDL, CHOLHDLRATIO   BNP No results for input(s): BNP in the last 72 hours.    Liver Enzymes Recent Labs     03/15/23  1931   ALT 23   AST 30   ALKPHOS 98   BILITOT 0.4      Thyroid Studies Lab Results   Component Value Date/Time    TSH 6.23 01/02/2023 01:45 PM          Procedures/imaging: see electronic medical records for all procedures/Xrays and details which were not copied into this note but were reviewed prior to creation of Tank Simmons MD
available sent. []Weekly Labs     Equipment:  []CPAP/BiPAP  []Wound Vacuum  []Ramos or Urinary Device  []PICC/Central Line  []Nebulizer  []Ventilator     Treatment:  []Isolation (for MRSA, VRE, etc.)  []Surgical Drain Management  []Tracheostomy Care  []Dressing Changes  []Dialysis with transportation  []PEG Care  []Oxygen  []Daily Weights for Heart Failure     Dietary:  []Any diet limitations  []Tube Feedings   []Total Parenteral Management (TPN)     Financial Resources:  []Medicaid Application Completed     []UAI Completed and copy given to pt/family     []A screening has previously been completed. []Level II Completed     [] Private pay individual who will not become   financially eligible for Medicaid within 6 months from admission to a [address] [place] [address] facility. [] Individual refused to have screening conducted. []Medicaid Application Completed     []The screening denied because it was determined individual did not need/did not qualify for nursing facility level of care. [] Out of state residents seeking direct admission to a [address] facility. [] Individuals who are inpatients of an out of state hospital, or in state or out of state veterans/ hospital and seek direct admission to a [address] facility  [] Individuals who are pateints or residents of a state owned/operated facility that is licensed by [employer] of [employer] [facility] and seek direct admission to [address] [place] [address]  [] A screening not required for enrollment in [facility] services as set out in [address] [place] [address] 72-  [] [facility]) staff shall perform screenings of the [facility] clients. Advanced Care Plan:  []Surrogate Decision Maker of Care  []POA  []Communicated Code Status and copy sent.      Other:
developed, alert, NAD, OX3  Head/Neck: NCAT, supple, No masses, No lymphadenopathy  CVS:Regular rate and rhythm, no M/R/G, S1/S2 heard, no thrill  Lungs:Clear to auscultation bilaterally, no wheezes, rhonchi, or rales  Abdomen: Soft, Nontender, No distention, Normal Bowel sounds, No hepatomegaly  Extremities: No C/C/E, pulses palpable 2+  Skin:normal texture and turgor, no rashes, no lesions  Neuro:grossly normal , follows commands  Psych:appropriate    Labs: Results:       Chemistry Recent Labs     03/15/23  1931   GLUCOSE 156*      K 3.9      CO2 25   BUN 23*   CREATININE 0.85   CALCIUM 8.7   BILITOT 0.4   AST 30   ALT 23   ALKPHOS 98   PROT 6.6   GLOB 3.2   LABGLOM >60        CBC w/Diff Recent Labs     03/15/23  1318   WBC 10.7   RBC 3.87*   HGB 12.4   HCT 37.7      LYMPHOPCT 7*        Cardiac Enzymes No results for input(s): CKTOTAL, CKMB, CKMBINDEX, TROPONINI, ALEKS in the last 72 hours. Coagulation Recent Labs     03/15/23  1318   PROTIME 12.7   INR 0.9         Lipid Panel No results found for: CHOL, TRIG, HDL, LDLCHOLESTEROL, LDLCALC, LABVLDL, VLDL, CHOLHDLRATIO   BNP No results for input(s): BNP in the last 72 hours.    Liver Enzymes Recent Labs     03/15/23  1931   ALT 23   AST 30   ALKPHOS 98   BILITOT 0.4        Thyroid Studies Lab Results   Component Value Date/Time    TSH 6.23 01/02/2023 01:45 PM          Procedures/imaging: see electronic medical records for all procedures/Xrays and details which were not copied into this note but were reviewed prior to creation of Augusto Roberson MD
stability, and prior level of function should also be taken into consideration. SUBJECTIVE:   Patient stated: \"It hurts\"    OBJECTIVE DATA SUMMARY:   Functional Mobility Training:  Transfers:  Transfer Training  Transfer Training: Yes  Sit to Stand: Contact-guard assistance;Minimum assistance  Stand to Sit: Contact-guard assistance  Balance:  Balance  Sitting: Intact  Standing: Impaired  Standing - Static: Good  Standing - Dynamic: Fair   Ambulation/Gait Training:   Gait Training  Gait Training: Yes  Gait  Overall Level of Assistance: Contact-guard assistance  Interventions: Tactile cues; Verbal cues  Base of Support: Narrowed  Speed/Loraine: Slow  Step Length: Left shortened;Right shortened  Stance: Time  Gait Abnormalities: Decreased step clearance; Antalgic  Distance (ft): 30 Feet (x2)  Assistive Device: Gait belt;Walker, rolling  Pain:  Pain level pre-treatment: 5/10  Pain level post-treatment: 5/10   Pain Intervention(s): Rest, Ice, Repositioning   Response to intervention: Nurse notified    Activity Tolerance:   Patient limited by fatigue;Patient limited by pain  Please refer to the flowsheet for vital signs taken during this treatment. After treatment:   [x] Patient left in no apparent distress sitting up in chair  [] Patient left in no apparent distress in bed  [x] Call bell left within reach  [x] Nursing notified  [] Caregiver present  [] Bed alarm activated  [] SCDs applied      COMMUNICATION/EDUCATION:   Patient Education  Education Given To: Patient  Education Provided: Role of Therapy;Plan of Care;Home Exercise Program;Precautions;Transfer Training;Equipment; Energy Conservation; Fall Prevention Strategies  Education Method: Demonstration;Verbal  Barriers to Learning: None  Education Outcome: Verbalized understanding;Demonstrated understanding      Elizabeth Mills, PT  Minutes: [address] AM-PAC® Basic Mobility Inpatient Short Form (6-Clicks) Version 2    How much HELP from another person
the following:     [facility] with (X) only those applicable:  Medication:  []Medications are available at the facility  []IV Antibiotics    []Controlled Substance - hard copies available sent. []Weekly Labs    Equipment:  []CPAP/BiPAP  []Wound Vacuum  []Ramos or Urinary Device  []PICC/Central Line  []Nebulizer  []Ventilator    Treatment:  []Isolation (for MRSA, VRE, etc.)  []Surgical Drain Management  []Tracheostomy Care  []Dressing Changes  []Dialysis with transportation  []PEG Care  []Oxygen  []Daily Weights for Heart Failure    Dietary:  []Any diet limitations  []Tube Feedings   []Total Parenteral Management (TPN)    Financial Resources:  []Medicaid Application Completed    []UAI Completed and copy given to pt/family    []A screening has previously been completed. []Level II Completed    [] Private pay individual who will not become   financially eligible for Medicaid within 6 months from admission to a [address] [place] [address] facility. [] Individual refused to have screening conducted. []Medicaid Application Completed    []The screening denied because it was determined individual did not need/did not qualify for nursing facility level of care. [] Out of state residents seeking direct admission to a [address] facility. [] Individuals who are inpatients of an out of state hospital, or in state or out of state veterans/ hospital and seek direct admission to a [address] facility  [] Individuals who are pateints or residents of a state owned/operated facility that is licensed by [employer] and seek direct admission to [address] [place] [address]  [] A screening not required for enrollment in [facility] as set out in [address] [place] [address] 19-  [] [facility] [place]) staff shall perform screenings of the [facility] clients. Advanced Care Plan:  []Surrogate Decision Maker of Care  []POA  []Communicated Code Status and copy sent.     Other:
(Last 24 hours) at 3/19/2023 1054  Last data filed at 3/19/2023 8992  Gross per 24 hour   Intake 600 ml   Output 800 ml   Net -200 ml       Patient Vitals for the past 120 hrs:   Weight   03/15/23 1145 155 lb (70.3 kg)   03/17/23 2332 163 lb 12.8 oz (74.3 kg)         Physical Exam  Constitutional:       General: She is in acute distress. Appearance: She is ill-appearing. She is not toxic-appearing. HENT:      Head: Normocephalic and atraumatic. Nose: Nose normal.      Mouth/Throat:      Mouth: Mucous membranes are moist.   Eyes:      Extraocular Movements: Extraocular movements intact. Pupils: Pupils are equal, round, and reactive to light. Cardiovascular:      Rate and Rhythm: Normal rate and regular rhythm. Pulmonary:      Effort: Pulmonary effort is normal. No respiratory distress. Breath sounds: Normal breath sounds. No wheezing, rhonchi or rales. Chest:      Chest wall: No tenderness. Abdominal:      General: Abdomen is flat. Bowel sounds are normal.      Palpations: Abdomen is soft. Tenderness: There is no abdominal tenderness. There is no guarding or rebound. Musculoskeletal:         General: Tenderness present. Cervical back: Normal range of motion. Right lower leg: No edema. Left lower leg: No edema. Skin:     General: Skin is warm and dry. Neurological:      General: No focal deficit present. Mental Status: She is oriented to person, place, and time.    Psychiatric:         Mood and Affect: Mood normal.            Intake and Output:  Current Shift:  03/19 0701 - 03/19 1900  In: 240 [P.O.:240]  Out: -   Last three shifts:  03/17 1901 - 03/19 0700  In: 587 [P.O.:587]  Out: 1000 [Urine:1000]    Lab/Data Reviewed:  Recent Results (from the past 8 hour(s))   CBC with Auto Differential    Collection Time: 03/19/23  5:21 AM   Result Value Ref Range    WBC 6.4 4.6 - 13.2 K/uL    RBC 3.64 (L) 4.20 - 5.30 M/uL    Hemoglobin 11.2 (L) 12.0 - 16.0 g/dL
151/74 97.6 °F (36.4 °C) Oral 69 18 94 % --       Intake/Output Summary (Last 24 hours) at 3/18/2023 1154  Last data filed at 3/18/2023 0941  Gross per 24 hour   Intake 227 ml   Output 200 ml   Net 27 ml     Patient Vitals for the past 120 hrs:   Weight   03/15/23 1145 155 lb (70.3 kg)   03/17/23 2332 163 lb 12.8 oz (74.3 kg)       Physical Exam  Constitutional:       General: She is in acute distress. Appearance: She is ill-appearing. She is not toxic-appearing. HENT:      Head: Normocephalic and atraumatic. Nose: Nose normal.      Mouth/Throat:      Mouth: Mucous membranes are moist.   Eyes:      Extraocular Movements: Extraocular movements intact. Pupils: Pupils are equal, round, and reactive to light. Cardiovascular:      Rate and Rhythm: Normal rate and regular rhythm. Pulmonary:      Effort: Pulmonary effort is normal. No respiratory distress. Breath sounds: Normal breath sounds. No wheezing, rhonchi or rales. Chest:      Chest wall: No tenderness. Abdominal:      General: Abdomen is flat. Bowel sounds are normal.      Palpations: Abdomen is soft. Tenderness: There is no abdominal tenderness. There is no guarding or rebound. Musculoskeletal:         General: Tenderness present. Cervical back: Normal range of motion. Right lower leg: No edema. Left lower leg: No edema. Skin:     General: Skin is warm and dry. Neurological:      General: No focal deficit present. Mental Status: She is oriented to person, place, and time. Psychiatric:         Mood and Affect: Mood normal.            Intake and Output:  Current Shift:  03/18 0701 - 03/18 1900  In: 227 [P.O.:227]  Out: -   Last three shifts:  03/16 1901 - 03/18 0700  In: -   Out: 200 [Urine:200]    Lab/Data Reviewed:  No results found for this or any previous visit (from the past 8 hour(s)).   Medications:  Current Facility-Administered Medications   Medication Dose Route Frequency    acetaminophen
Daniel Cordlolis     The Patient and/or Patient Representative Agree with the Discharge Plan?  Yes    [address]  Case Management Department
Care;Energy Conservation; Fall Prevention Strategies  Education Method: Verbal  Barriers to Learning: None  Education Outcome: Verbalized understanding    Thank you for this referral.  Jazmine Acharya OTR/L  Minutes: 26    Eval Complexity: Decision Making: Medium Complexity    [facility] AM-PAC® Daily Activity Inpatient Short Form (6-Clicks)*    How much HELP from another person does the patient currently need    (If the patient hasn't done an activity recently, how much help from another person do you think he/she would need if he/she tried?)   Total (Total A or Dep)   A Lot  (Mod to Max A)   A Little (Sup or Min A)   None (Mod I to I)   Putting on and taking off regular lower body clothing? [] 1 [x] 2 [] 3 [] 4   2. Bathing (including washing, rinsing,      drying)? [] 1 [x] 2 [] 3 [] 4   3. Toileting, which includes using toilet, bedpan or urinal?   [] 1 [x] 2 [] 3 [] 4   4. Putting on and taking off regular upper body clothing? [] 1 [] 2 [x] 3 [] 4   5. Taking care of personal grooming such as brushing teeth? [] 1 [x] 2 [] 3 [] 4   6. Eating meals? [] 1 [] 2 [x] 3 [] 4     Current research shows that an AM-PAC score of 17 or less is not associated with a discharge to the patient's home setting. Based on an AM-PAC score of **/24 and their current ADL deficits; it is recommended that the patient have 5-7 sessions per week of Occupational Therapy at d/c to increase the patient's independence. Currently, this patient demonstrates the potential endurance, and/or tolerance for 3 hours of therapy each day at d/c. Current research shows that an AM-PAC score of 17 or less is not associated with a discharge to the patient's home setting. Based on an AM-PAC score of **/24 and their current ADL deficits; it is recommended that the patient have 3-5 sessions per week of Occupational Therapy at d/c to increase the patient's independence.       Current research shows that an AM-PAC score of 18 or

## 2024-01-01 ENCOUNTER — HOSPITAL ENCOUNTER (INPATIENT)
Facility: HOSPITAL | Age: 89
LOS: 4 days | End: 2024-04-16
Attending: INTERNAL MEDICINE | Admitting: INTERNAL MEDICINE

## 2024-01-01 ENCOUNTER — HOME CARE VISIT (OUTPATIENT)
Age: 89
End: 2024-01-01
Payer: MEDICARE

## 2024-01-01 ENCOUNTER — HOSPICE ADMISSION (OUTPATIENT)
Age: 89
End: 2024-01-01
Payer: MEDICARE

## 2024-01-01 VITALS — OXYGEN SATURATION: 96 % | TEMPERATURE: 98 F | HEART RATE: 126 BPM | RESPIRATION RATE: 16 BRPM

## 2024-01-01 VITALS
DIASTOLIC BLOOD PRESSURE: 54 MMHG | HEART RATE: 134 BPM | SYSTOLIC BLOOD PRESSURE: 93 MMHG | RESPIRATION RATE: 15 BRPM | OXYGEN SATURATION: 97 % | TEMPERATURE: 98.3 F

## 2024-01-01 VITALS
DIASTOLIC BLOOD PRESSURE: 58 MMHG | SYSTOLIC BLOOD PRESSURE: 88 MMHG | HEART RATE: 140 BPM | OXYGEN SATURATION: 93 % | TEMPERATURE: 99.3 F

## 2024-01-01 VITALS
SYSTOLIC BLOOD PRESSURE: 147 MMHG | HEIGHT: 61 IN | DIASTOLIC BLOOD PRESSURE: 115 MMHG | RESPIRATION RATE: 18 BRPM | OXYGEN SATURATION: 97 % | HEART RATE: 97 BPM | BODY MASS INDEX: 27 KG/M2 | WEIGHT: 143 LBS | TEMPERATURE: 98.7 F

## 2024-01-01 PROCEDURE — 6360000002 HC RX W HCPCS: Performed by: INTERNAL MEDICINE

## 2024-01-01 PROCEDURE — G0299 HHS/HOSPICE OF RN EA 15 MIN: HCPCS

## 2024-01-01 PROCEDURE — 2580000003 HC RX 258: Performed by: INTERNAL MEDICINE

## 2024-01-01 PROCEDURE — 1100000000 HC RM PRIVATE

## 2024-01-01 PROCEDURE — 0656 HSPC GENERAL INPATIENT

## 2024-01-01 PROCEDURE — 2700000000 HC OXYGEN THERAPY PER DAY

## 2024-01-01 PROCEDURE — G0155 HHCP-SVS OF CSW,EA 15 MIN: HCPCS

## 2024-01-01 PROCEDURE — 0651 HSPC ROUTINE HOME CARE

## 2024-01-01 PROCEDURE — 6370000000 HC RX 637 (ALT 250 FOR IP): Performed by: INTERNAL MEDICINE

## 2024-01-01 PROCEDURE — 3331090004 HSPC SERVICE INTENSITY ADD-ON

## 2024-01-01 RX ORDER — LORAZEPAM 2 MG/ML
1 CONCENTRATE ORAL
Status: DISCONTINUED | OUTPATIENT
Start: 2024-01-01 | End: 2024-04-17 | Stop reason: HOSPADM

## 2024-01-01 RX ORDER — BISACODYL 5 MG/1
5 TABLET, DELAYED RELEASE ORAL DAILY PRN
Status: DISCONTINUED | OUTPATIENT
Start: 2024-01-01 | End: 2024-04-17 | Stop reason: HOSPADM

## 2024-01-01 RX ORDER — ATROPINE SULFATE 10 MG/ML
2 SOLUTION/ DROPS OPHTHALMIC EVERY 4 HOURS PRN
Status: DISCONTINUED | OUTPATIENT
Start: 2024-01-01 | End: 2024-04-17 | Stop reason: HOSPADM

## 2024-01-01 RX ORDER — MORPHINE SULFATE/PF 50 MG/50ML
PATIENT CONTROLLED ANALGESIA SYRINGE INTRAVENOUS
Status: DISCONTINUED | OUTPATIENT
Start: 2024-01-01 | End: 2024-04-17 | Stop reason: HOSPADM

## 2024-01-01 RX ORDER — MORPHINE SULFATE 20 MG/ML
2.5 SOLUTION ORAL
Status: DISCONTINUED | OUTPATIENT
Start: 2024-01-01 | End: 2024-04-17 | Stop reason: HOSPADM

## 2024-01-01 RX ORDER — IPRATROPIUM BROMIDE AND ALBUTEROL SULFATE 2.5; .5 MG/3ML; MG/3ML
1 SOLUTION RESPIRATORY (INHALATION) EVERY 4 HOURS PRN
Status: DISCONTINUED | OUTPATIENT
Start: 2024-01-01 | End: 2024-04-17 | Stop reason: HOSPADM

## 2024-01-01 RX ORDER — ACETAMINOPHEN 650 MG/1
650 SUPPOSITORY RECTAL EVERY 4 HOURS PRN
Status: DISCONTINUED | OUTPATIENT
Start: 2024-01-01 | End: 2024-04-17 | Stop reason: HOSPADM

## 2024-01-01 RX ORDER — PROMETHAZINE HYDROCHLORIDE 12.5 MG/1
25 SUPPOSITORY RECTAL EVERY 4 HOURS PRN
Status: DISCONTINUED | OUTPATIENT
Start: 2024-01-01 | End: 2024-04-17 | Stop reason: HOSPADM

## 2024-01-01 RX ORDER — SCOLOPAMINE TRANSDERMAL SYSTEM 1 MG/1
1 PATCH, EXTENDED RELEASE TRANSDERMAL
Status: DISCONTINUED | OUTPATIENT
Start: 2024-01-01 | End: 2024-04-17 | Stop reason: HOSPADM

## 2024-01-01 RX ADMIN — MORPHINE SULFATE 30 MG: 1 INJECTION INTRAVENOUS at 09:30

## 2024-01-01 RX ADMIN — MORPHINE SULFATE 30 MG: 1 INJECTION INTRAVENOUS at 11:30

## 2024-01-01 RX ADMIN — MORPHINE SULFATE: 1 INJECTION INTRAVENOUS at 11:19

## 2024-01-01 RX ADMIN — MORPHINE SULFATE: 1 INJECTION INTRAVENOUS at 18:02

## 2024-01-01 RX ADMIN — MORPHINE SULFATE 1 MG: 1 INJECTION INTRAVENOUS at 01:52

## 2024-01-01 RX ADMIN — ATROPINE SULFATE 2 DROP: 10 SOLUTION/ DROPS OPHTHALMIC at 16:35

## 2024-01-01 RX ADMIN — MORPHINE SULFATE 30 MG: 1 INJECTION INTRAVENOUS at 00:27

## 2024-01-01 RX ADMIN — ACETAMINOPHEN 650 MG: 650 SUPPOSITORY RECTAL at 15:08

## 2024-01-01 RX ADMIN — MORPHINE SULFATE 30 MG: 1 INJECTION INTRAVENOUS at 16:31

## 2024-01-01 RX ADMIN — ATROPINE SULFATE 2 DROP: 10 SOLUTION/ DROPS OPHTHALMIC at 22:00

## 2024-01-01 ASSESSMENT — PAIN SCALES - GENERAL
PAINLEVEL_OUTOF10: 0
PAINLEVEL_OUTOF10: 0

## 2024-01-01 ASSESSMENT — ENCOUNTER SYMPTOMS
STOOL DESCRIPTION: LOOSE
BOWEL INCONTINENCE: 1
PAIN LOCATION - PAIN QUALITY: ACHING
BOWEL INCONTINENCE: 1
STOOL DESCRIPTION: SMALL

## 2024-01-01 ASSESSMENT — PAIN SCALES - WONG BAKER
WONGBAKER_NUMERICALRESPONSE: NO HURT

## 2024-02-20 ENCOUNTER — HOSPITAL ENCOUNTER (INPATIENT)
Facility: HOSPITAL | Age: 89
LOS: 8 days | Discharge: NURSING, REHAB OR LONG-TERM CARE | DRG: 372 | End: 2024-02-28
Attending: HOSPITALIST | Admitting: HOSPITALIST
Payer: MEDICARE

## 2024-02-20 ENCOUNTER — APPOINTMENT (OUTPATIENT)
Facility: HOSPITAL | Age: 89
DRG: 372 | End: 2024-02-20
Payer: MEDICARE

## 2024-02-20 DIAGNOSIS — K35.32 ACUTE APPENDICITIS WITH PERFORATION AND LOCALIZED PERITONITIS, WITHOUT ABSCESS OR GANGRENE: Primary | ICD-10-CM

## 2024-02-20 PROBLEM — K35.80 ACUTE APPENDICITIS: Status: ACTIVE | Noted: 2024-02-20

## 2024-02-20 LAB
ALBUMIN SERPL-MCNC: 3.2 G/DL (ref 3.4–5)
ALBUMIN/GLOB SERPL: 1 (ref 0.8–1.7)
ALP SERPL-CCNC: 103 U/L (ref 45–117)
ALT SERPL-CCNC: 14 U/L (ref 13–56)
ANION GAP SERPL CALC-SCNC: 5 MMOL/L (ref 3–18)
APPEARANCE UR: CLEAR
AST SERPL-CCNC: 17 U/L (ref 10–38)
BACTERIA URNS QL MICRO: ABNORMAL /HPF
BASOPHILS # BLD: 0 K/UL (ref 0–0.1)
BASOPHILS NFR BLD: 0 % (ref 0–2)
BILIRUB SERPL-MCNC: 1.1 MG/DL (ref 0.2–1)
BILIRUB UR QL: NEGATIVE
BUN SERPL-MCNC: 14 MG/DL (ref 7–18)
BUN/CREAT SERPL: 17 (ref 12–20)
CALCIUM SERPL-MCNC: 8.5 MG/DL (ref 8.5–10.1)
CHLORIDE SERPL-SCNC: 103 MMOL/L (ref 100–111)
CO2 SERPL-SCNC: 27 MMOL/L (ref 21–32)
COLOR UR: YELLOW
CREAT SERPL-MCNC: 0.83 MG/DL (ref 0.6–1.3)
DIFFERENTIAL METHOD BLD: ABNORMAL
EOSINOPHIL # BLD: 0 K/UL (ref 0–0.4)
EOSINOPHIL NFR BLD: 0 % (ref 0–5)
EPITH CASTS URNS QL MICRO: ABNORMAL /LPF (ref 0–5)
ERYTHROCYTE [DISTWIDTH] IN BLOOD BY AUTOMATED COUNT: 12.8 % (ref 11.6–14.5)
GLOBULIN SER CALC-MCNC: 3.1 G/DL (ref 2–4)
GLUCOSE SERPL-MCNC: 113 MG/DL (ref 74–99)
GLUCOSE UR STRIP.AUTO-MCNC: NEGATIVE MG/DL
HCT VFR BLD AUTO: 38.1 % (ref 35–45)
HGB BLD-MCNC: 12.7 G/DL (ref 12–16)
HGB UR QL STRIP: NEGATIVE
IMM GRANULOCYTES # BLD AUTO: 0.1 K/UL (ref 0–0.04)
IMM GRANULOCYTES NFR BLD AUTO: 1 % (ref 0–0.5)
KETONES UR QL STRIP.AUTO: ABNORMAL MG/DL
LEUKOCYTE ESTERASE UR QL STRIP.AUTO: ABNORMAL
LIPASE SERPL-CCNC: 21 U/L (ref 13–75)
LYMPHOCYTES # BLD: 0.5 K/UL (ref 0.9–3.6)
LYMPHOCYTES NFR BLD: 3 % (ref 21–52)
MCH RBC QN AUTO: 34.1 PG (ref 24–34)
MCHC RBC AUTO-ENTMCNC: 33.3 G/DL (ref 31–37)
MCV RBC AUTO: 102.4 FL (ref 78–100)
MONOCYTES # BLD: 0.9 K/UL (ref 0.05–1.2)
MONOCYTES NFR BLD: 6 % (ref 3–10)
NEUTS SEG # BLD: 13.8 K/UL (ref 1.8–8)
NEUTS SEG NFR BLD: 90 % (ref 40–73)
NITRITE UR QL STRIP.AUTO: NEGATIVE
NRBC # BLD: 0 K/UL (ref 0–0.01)
NRBC BLD-RTO: 0 PER 100 WBC
PH UR STRIP: 5.5 (ref 5–8)
PLATELET # BLD AUTO: 188 K/UL (ref 135–420)
PMV BLD AUTO: 10.4 FL (ref 9.2–11.8)
POTASSIUM SERPL-SCNC: 3.9 MMOL/L (ref 3.5–5.5)
PROT SERPL-MCNC: 6.3 G/DL (ref 6.4–8.2)
PROT UR STRIP-MCNC: 30 MG/DL
RBC # BLD AUTO: 3.72 M/UL (ref 4.2–5.3)
RBC #/AREA URNS HPF: ABNORMAL /HPF (ref 0–5)
SODIUM SERPL-SCNC: 135 MMOL/L (ref 136–145)
SP GR UR REFRACTOMETRY: 1.02 (ref 1–1.03)
UROBILINOGEN UR QL STRIP.AUTO: 1 EU/DL (ref 0.2–1)
WBC # BLD AUTO: 15.3 K/UL (ref 4.6–13.2)
WBC URNS QL MICRO: ABNORMAL /HPF (ref 0–5)

## 2024-02-20 PROCEDURE — 6360000002 HC RX W HCPCS: Performed by: PHYSICIAN ASSISTANT

## 2024-02-20 PROCEDURE — 83690 ASSAY OF LIPASE: CPT

## 2024-02-20 PROCEDURE — 80053 COMPREHEN METABOLIC PANEL: CPT

## 2024-02-20 PROCEDURE — 2580000003 HC RX 258: Performed by: HOSPITALIST

## 2024-02-20 PROCEDURE — 1100000000 HC RM PRIVATE

## 2024-02-20 PROCEDURE — 85025 COMPLETE CBC W/AUTO DIFF WBC: CPT

## 2024-02-20 PROCEDURE — 6360000004 HC RX CONTRAST MEDICATION: Performed by: PHYSICIAN ASSISTANT

## 2024-02-20 PROCEDURE — 2580000003 HC RX 258: Performed by: PHYSICIAN ASSISTANT

## 2024-02-20 PROCEDURE — 99285 EMERGENCY DEPT VISIT HI MDM: CPT

## 2024-02-20 PROCEDURE — 96374 THER/PROPH/DIAG INJ IV PUSH: CPT

## 2024-02-20 PROCEDURE — 96375 TX/PRO/DX INJ NEW DRUG ADDON: CPT

## 2024-02-20 PROCEDURE — 74177 CT ABD & PELVIS W/CONTRAST: CPT

## 2024-02-20 PROCEDURE — 81001 URINALYSIS AUTO W/SCOPE: CPT

## 2024-02-20 PROCEDURE — 6360000002 HC RX W HCPCS: Performed by: HOSPITALIST

## 2024-02-20 RX ORDER — MORPHINE SULFATE 2 MG/ML
1 INJECTION, SOLUTION INTRAMUSCULAR; INTRAVENOUS EVERY 4 HOURS PRN
Status: DISCONTINUED | OUTPATIENT
Start: 2024-02-20 | End: 2024-02-28 | Stop reason: HOSPADM

## 2024-02-20 RX ORDER — ONDANSETRON 2 MG/ML
4 INJECTION INTRAMUSCULAR; INTRAVENOUS EVERY 6 HOURS PRN
Status: DISCONTINUED | OUTPATIENT
Start: 2024-02-20 | End: 2024-02-28 | Stop reason: HOSPADM

## 2024-02-20 RX ORDER — POTASSIUM CHLORIDE 7.45 MG/ML
10 INJECTION INTRAVENOUS PRN
Status: DISCONTINUED | OUTPATIENT
Start: 2024-02-20 | End: 2024-02-28 | Stop reason: HOSPADM

## 2024-02-20 RX ORDER — MORPHINE SULFATE 2 MG/ML
1 INJECTION, SOLUTION INTRAMUSCULAR; INTRAVENOUS
Status: COMPLETED | OUTPATIENT
Start: 2024-02-20 | End: 2024-02-20

## 2024-02-20 RX ORDER — ONDANSETRON 4 MG/1
4 TABLET, ORALLY DISINTEGRATING ORAL EVERY 8 HOURS PRN
Status: DISCONTINUED | OUTPATIENT
Start: 2024-02-20 | End: 2024-02-28 | Stop reason: HOSPADM

## 2024-02-20 RX ORDER — SODIUM CHLORIDE 9 MG/ML
INJECTION, SOLUTION INTRAVENOUS CONTINUOUS
Status: DISCONTINUED | OUTPATIENT
Start: 2024-02-20 | End: 2024-02-22

## 2024-02-20 RX ORDER — ACETAMINOPHEN 325 MG/1
650 TABLET ORAL EVERY 6 HOURS PRN
Status: DISCONTINUED | OUTPATIENT
Start: 2024-02-20 | End: 2024-02-28 | Stop reason: HOSPADM

## 2024-02-20 RX ORDER — POLYETHYLENE GLYCOL 3350 17 G/17G
17 POWDER, FOR SOLUTION ORAL DAILY PRN
Status: DISCONTINUED | OUTPATIENT
Start: 2024-02-20 | End: 2024-02-28 | Stop reason: HOSPADM

## 2024-02-20 RX ORDER — SODIUM CHLORIDE 9 MG/ML
INJECTION, SOLUTION INTRAVENOUS PRN
Status: DISCONTINUED | OUTPATIENT
Start: 2024-02-20 | End: 2024-02-28 | Stop reason: HOSPADM

## 2024-02-20 RX ORDER — METRONIDAZOLE 500 MG/100ML
500 INJECTION, SOLUTION INTRAVENOUS
Status: DISCONTINUED | OUTPATIENT
Start: 2024-02-20 | End: 2024-02-20

## 2024-02-20 RX ORDER — POTASSIUM CHLORIDE 20 MEQ/1
40 TABLET, EXTENDED RELEASE ORAL PRN
Status: DISCONTINUED | OUTPATIENT
Start: 2024-02-20 | End: 2024-02-28 | Stop reason: HOSPADM

## 2024-02-20 RX ORDER — MAGNESIUM SULFATE IN WATER 40 MG/ML
2000 INJECTION, SOLUTION INTRAVENOUS PRN
Status: DISCONTINUED | OUTPATIENT
Start: 2024-02-20 | End: 2024-02-28 | Stop reason: HOSPADM

## 2024-02-20 RX ORDER — ONDANSETRON 2 MG/ML
4 INJECTION INTRAMUSCULAR; INTRAVENOUS
Status: COMPLETED | OUTPATIENT
Start: 2024-02-20 | End: 2024-02-20

## 2024-02-20 RX ORDER — SODIUM CHLORIDE 0.9 % (FLUSH) 0.9 %
5-40 SYRINGE (ML) INJECTION EVERY 12 HOURS SCHEDULED
Status: DISCONTINUED | OUTPATIENT
Start: 2024-02-20 | End: 2024-02-28 | Stop reason: HOSPADM

## 2024-02-20 RX ORDER — SODIUM CHLORIDE 0.9 % (FLUSH) 0.9 %
5-40 SYRINGE (ML) INJECTION PRN
Status: DISCONTINUED | OUTPATIENT
Start: 2024-02-20 | End: 2024-02-28 | Stop reason: HOSPADM

## 2024-02-20 RX ORDER — METRONIDAZOLE 500 MG/100ML
500 INJECTION, SOLUTION INTRAVENOUS
Status: COMPLETED | OUTPATIENT
Start: 2024-02-20 | End: 2024-02-20

## 2024-02-20 RX ORDER — ENOXAPARIN SODIUM 100 MG/ML
40 INJECTION SUBCUTANEOUS DAILY
Status: DISCONTINUED | OUTPATIENT
Start: 2024-02-20 | End: 2024-02-28 | Stop reason: HOSPADM

## 2024-02-20 RX ORDER — METRONIDAZOLE 500 MG/100ML
500 INJECTION, SOLUTION INTRAVENOUS EVERY 8 HOURS
Status: DISCONTINUED | OUTPATIENT
Start: 2024-02-21 | End: 2024-02-28 | Stop reason: HOSPADM

## 2024-02-20 RX ORDER — ACETAMINOPHEN 650 MG/1
650 SUPPOSITORY RECTAL EVERY 6 HOURS PRN
Status: DISCONTINUED | OUTPATIENT
Start: 2024-02-20 | End: 2024-02-28 | Stop reason: HOSPADM

## 2024-02-20 RX ADMIN — SODIUM CHLORIDE, PRESERVATIVE FREE 10 ML: 5 INJECTION INTRAVENOUS at 21:59

## 2024-02-20 RX ADMIN — SODIUM CHLORIDE: 9 INJECTION, SOLUTION INTRAVENOUS at 19:40

## 2024-02-20 RX ADMIN — IOPAMIDOL 100 ML: 612 INJECTION, SOLUTION INTRAVENOUS at 15:03

## 2024-02-20 RX ADMIN — ENOXAPARIN SODIUM 40 MG: 100 INJECTION SUBCUTANEOUS at 19:40

## 2024-02-20 RX ADMIN — ONDANSETRON 4 MG: 2 INJECTION INTRAMUSCULAR; INTRAVENOUS at 14:35

## 2024-02-20 RX ADMIN — MORPHINE SULFATE 1 MG: 2 INJECTION, SOLUTION INTRAMUSCULAR; INTRAVENOUS at 14:36

## 2024-02-20 RX ADMIN — CEFTRIAXONE 1000 MG: 1 INJECTION, POWDER, FOR SOLUTION INTRAMUSCULAR; INTRAVENOUS at 19:41

## 2024-02-20 RX ADMIN — METRONIDAZOLE 500 MG: 500 INJECTION, SOLUTION INTRAVENOUS at 21:56

## 2024-02-20 ASSESSMENT — PAIN - FUNCTIONAL ASSESSMENT: PAIN_FUNCTIONAL_ASSESSMENT: 0-10

## 2024-02-20 ASSESSMENT — PAIN SCALES - GENERAL
PAINLEVEL_OUTOF10: 9
PAINLEVEL_OUTOF10: 9
PAINLEVEL_OUTOF10: 0

## 2024-02-20 ASSESSMENT — PAIN DESCRIPTION - LOCATION: LOCATION: ABDOMEN

## 2024-02-20 NOTE — ED NOTES
Bedside and Verbal shift change report given to Liv  (oncoming nurse) by MADISON VILLAR RN   (offgoing nurse). Report included the following information Index, ED Encounter Summary, ED SBAR, and Recent Results.

## 2024-02-20 NOTE — ED PROVIDER NOTES
MG delayed release capsule  Commonly known as: PRILOSEC     Vitamin D3 25 MCG Tabs  Take 1 tablet by mouth daily                   I am the Primary Clinician of Record.       (Please note that parts of this dictation were completed with voice recognition software. Quite often unanticipated grammatical, syntax, homophones, and other interpretive errors are inadvertently transcribed by the computer software. Please disregards these errors. Please excuse any errors that have escaped final proofreading.)       Loyda Jasmine PA  02/20/24 1622       Loyda Jasmnie PA  02/20/24 0945

## 2024-02-20 NOTE — ED TRIAGE NOTES
Pt sts she started having abd pain in upper quad. Hx of diverticulitis. Family sts she has been under tremendous stress. Sts her pain has increased and spread to bilat lower quads. No fever. Pt sts she is nauseated, but has not vomited.

## 2024-02-21 LAB
ANION GAP SERPL CALC-SCNC: 8 MMOL/L (ref 3–18)
BUN SERPL-MCNC: 16 MG/DL (ref 7–18)
BUN/CREAT SERPL: 21 (ref 12–20)
CALCIUM SERPL-MCNC: 8.2 MG/DL (ref 8.5–10.1)
CHLORIDE SERPL-SCNC: 108 MMOL/L (ref 100–111)
CO2 SERPL-SCNC: 21 MMOL/L (ref 21–32)
CREAT SERPL-MCNC: 0.77 MG/DL (ref 0.6–1.3)
ERYTHROCYTE [DISTWIDTH] IN BLOOD BY AUTOMATED COUNT: 13 % (ref 11.6–14.5)
GLUCOSE BLD STRIP.AUTO-MCNC: 103 MG/DL (ref 70–110)
GLUCOSE BLD STRIP.AUTO-MCNC: 85 MG/DL (ref 70–110)
GLUCOSE SERPL-MCNC: 100 MG/DL (ref 74–99)
HCT VFR BLD AUTO: 33.1 % (ref 35–45)
HGB BLD-MCNC: 10.6 G/DL (ref 12–16)
MCH RBC QN AUTO: 33.4 PG (ref 24–34)
MCHC RBC AUTO-ENTMCNC: 32 G/DL (ref 31–37)
MCV RBC AUTO: 104.4 FL (ref 78–100)
NRBC # BLD: 0 K/UL (ref 0–0.01)
NRBC BLD-RTO: 0 PER 100 WBC
PLATELET # BLD AUTO: 170 K/UL (ref 135–420)
PMV BLD AUTO: 10.5 FL (ref 9.2–11.8)
POTASSIUM SERPL-SCNC: 3.6 MMOL/L (ref 3.5–5.5)
RBC # BLD AUTO: 3.17 M/UL (ref 4.2–5.3)
SODIUM SERPL-SCNC: 137 MMOL/L (ref 136–145)
WBC # BLD AUTO: 14.4 K/UL (ref 4.6–13.2)

## 2024-02-21 PROCEDURE — 6360000002 HC RX W HCPCS: Performed by: HOSPITALIST

## 2024-02-21 PROCEDURE — 36415 COLL VENOUS BLD VENIPUNCTURE: CPT

## 2024-02-21 PROCEDURE — 1100000000 HC RM PRIVATE

## 2024-02-21 PROCEDURE — 80048 BASIC METABOLIC PNL TOTAL CA: CPT

## 2024-02-21 PROCEDURE — 85027 COMPLETE CBC AUTOMATED: CPT

## 2024-02-21 PROCEDURE — 6370000000 HC RX 637 (ALT 250 FOR IP): Performed by: HOSPITALIST

## 2024-02-21 PROCEDURE — 99223 1ST HOSP IP/OBS HIGH 75: CPT | Performed by: INTERNAL MEDICINE

## 2024-02-21 PROCEDURE — 82962 GLUCOSE BLOOD TEST: CPT

## 2024-02-21 PROCEDURE — 2700000000 HC OXYGEN THERAPY PER DAY

## 2024-02-21 PROCEDURE — 2580000003 HC RX 258: Performed by: HOSPITALIST

## 2024-02-21 RX ADMIN — METRONIDAZOLE 500 MG: 500 INJECTION, SOLUTION INTRAVENOUS at 04:56

## 2024-02-21 RX ADMIN — ACETAMINOPHEN 650 MG: 650 SUPPOSITORY RECTAL at 01:11

## 2024-02-21 RX ADMIN — METRONIDAZOLE 500 MG: 500 INJECTION, SOLUTION INTRAVENOUS at 13:41

## 2024-02-21 RX ADMIN — ENOXAPARIN SODIUM 40 MG: 100 INJECTION SUBCUTANEOUS at 08:10

## 2024-02-21 RX ADMIN — SODIUM CHLORIDE, PRESERVATIVE FREE 10 ML: 5 INJECTION INTRAVENOUS at 20:51

## 2024-02-21 RX ADMIN — SODIUM CHLORIDE, PRESERVATIVE FREE 10 ML: 5 INJECTION INTRAVENOUS at 08:10

## 2024-02-21 RX ADMIN — MORPHINE SULFATE 1 MG: 2 INJECTION, SOLUTION INTRAMUSCULAR; INTRAVENOUS at 01:11

## 2024-02-21 RX ADMIN — MORPHINE SULFATE 1 MG: 2 INJECTION, SOLUTION INTRAMUSCULAR; INTRAVENOUS at 20:44

## 2024-02-21 RX ADMIN — METRONIDAZOLE 500 MG: 500 INJECTION, SOLUTION INTRAVENOUS at 20:49

## 2024-02-21 RX ADMIN — CEFTRIAXONE 1000 MG: 1 INJECTION, POWDER, FOR SOLUTION INTRAMUSCULAR; INTRAVENOUS at 20:43

## 2024-02-21 RX ADMIN — METOPROLOL TARTRATE 25 MG: 25 TABLET, FILM COATED ORAL at 20:43

## 2024-02-21 RX ADMIN — METOPROLOL TARTRATE 12.5 MG: 25 TABLET, FILM COATED ORAL at 08:09

## 2024-02-21 ASSESSMENT — PAIN SCALES - GENERAL
PAINLEVEL_OUTOF10: 9
PAINLEVEL_OUTOF10: 6

## 2024-02-21 ASSESSMENT — PAIN DESCRIPTION - DESCRIPTORS: DESCRIPTORS: SHARP

## 2024-02-21 ASSESSMENT — PAIN DESCRIPTION - LOCATION: LOCATION: ABDOMEN

## 2024-02-21 ASSESSMENT — PAIN DESCRIPTION - ORIENTATION: ORIENTATION: RIGHT;PROXIMAL

## 2024-02-21 NOTE — ACP (ADVANCE CARE PLANNING)
Advance Care Planning     General Advance Care Planning (ACP) Conversation    Date of Conversation: 2/21/2024  Conducted with: Patient with Decision Making Capacity    Healthcare Decision Maker:    Primary Decision Maker: Anaya Cuadra - Child - [phone]    Primary Decision Maker: Marc Montemayor - Child - [phone]    Today we documented Decision Maker(s) consistent with Legal Next of Kin hierarchy.    Content/Action Overview:  Has ACP document(s) NOT on file - requested patient to provide  Reviewed DNR/DNI and patient confirms current DNR status - completed forms on file (place new order if needed)    1235 Seen today in room 311 along with Dr. Gerson Odonnell.  Lying supine with head of bed elevated.  Awake, alert. Oriented x 4. Highly engaged in conversation.  Respirations unlabored at rest.  Able to speak in full  sentences. Pain -- 5/10 with increase during palpation.     Came to the ED 2/20/2024 from home for abdominal pain. Reviewed with general surgery who recommended medical management 2/2 multiple co-morbidities including recent CHF admission and aortic stenosis.    Abdominal CT: 1. Acute or subacute appendicitis with partial, contained rupture. No abscess. Reactive inflammation in the adjacent distal ileal loops and cecum. No bowel obstruction at this time. 2. Atherosclerosis. 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    Palliative Medicine consultation placed by Dr Grey for goals of care discussion    Introduced role of palliative care to the hospitalized patient.     Lives at assisted living,

## 2024-02-21 NOTE — PLAN OF CARE
Problem: ABCDS Injury Assessment  Goal: Absence of physical injury  2/21/2024 0936 by Patricia Brennan, RN  Outcome: Progressing  2/21/2024 0011 by Cira Damico, RN  Outcome: Progressing     Problem: Safety - Adult  Goal: Free from fall injury  Outcome: Progressing     Problem: Pain  Goal: Verbalizes/displays adequate comfort level or baseline comfort level  Outcome: Progressing

## 2024-02-22 LAB
ANION GAP SERPL CALC-SCNC: 10 MMOL/L (ref 3–18)
BUN SERPL-MCNC: 23 MG/DL (ref 7–18)
BUN/CREAT SERPL: 33 (ref 12–20)
CALCIUM SERPL-MCNC: 8.2 MG/DL (ref 8.5–10.1)
CHLORIDE SERPL-SCNC: 108 MMOL/L (ref 100–111)
CO2 SERPL-SCNC: 20 MMOL/L (ref 21–32)
CREAT SERPL-MCNC: 0.69 MG/DL (ref 0.6–1.3)
ERYTHROCYTE [DISTWIDTH] IN BLOOD BY AUTOMATED COUNT: 13 % (ref 11.6–14.5)
GLUCOSE SERPL-MCNC: 89 MG/DL (ref 74–99)
HCT VFR BLD AUTO: 34.1 % (ref 35–45)
HGB BLD-MCNC: 10.9 G/DL (ref 12–16)
MCH RBC QN AUTO: 33.9 PG (ref 24–34)
MCHC RBC AUTO-ENTMCNC: 32 G/DL (ref 31–37)
MCV RBC AUTO: 105.9 FL (ref 78–100)
NRBC # BLD: 0 K/UL (ref 0–0.01)
NRBC BLD-RTO: 0 PER 100 WBC
PLATELET # BLD AUTO: 185 K/UL (ref 135–420)
PMV BLD AUTO: 10.7 FL (ref 9.2–11.8)
POTASSIUM SERPL-SCNC: 4.1 MMOL/L (ref 3.5–5.5)
RBC # BLD AUTO: 3.22 M/UL (ref 4.2–5.3)
SODIUM SERPL-SCNC: 138 MMOL/L (ref 136–145)
WBC # BLD AUTO: 12.2 K/UL (ref 4.6–13.2)

## 2024-02-22 PROCEDURE — 99232 SBSQ HOSP IP/OBS MODERATE 35: CPT | Performed by: NURSE PRACTITIONER

## 2024-02-22 PROCEDURE — 97110 THERAPEUTIC EXERCISES: CPT

## 2024-02-22 PROCEDURE — 80048 BASIC METABOLIC PNL TOTAL CA: CPT

## 2024-02-22 PROCEDURE — 6370000000 HC RX 637 (ALT 250 FOR IP): Performed by: HOSPITALIST

## 2024-02-22 PROCEDURE — 97535 SELF CARE MNGMENT TRAINING: CPT

## 2024-02-22 PROCEDURE — 97161 PT EVAL LOW COMPLEX 20 MIN: CPT

## 2024-02-22 PROCEDURE — 2580000003 HC RX 258: Performed by: HOSPITALIST

## 2024-02-22 PROCEDURE — 1100000000 HC RM PRIVATE

## 2024-02-22 PROCEDURE — 97116 GAIT TRAINING THERAPY: CPT

## 2024-02-22 PROCEDURE — 97165 OT EVAL LOW COMPLEX 30 MIN: CPT

## 2024-02-22 PROCEDURE — 85027 COMPLETE CBC AUTOMATED: CPT

## 2024-02-22 PROCEDURE — 2700000000 HC OXYGEN THERAPY PER DAY

## 2024-02-22 PROCEDURE — 6360000002 HC RX W HCPCS: Performed by: HOSPITALIST

## 2024-02-22 PROCEDURE — 36415 COLL VENOUS BLD VENIPUNCTURE: CPT

## 2024-02-22 RX ADMIN — METRONIDAZOLE 500 MG: 500 INJECTION, SOLUTION INTRAVENOUS at 05:07

## 2024-02-22 RX ADMIN — ENOXAPARIN SODIUM 40 MG: 100 INJECTION SUBCUTANEOUS at 08:47

## 2024-02-22 RX ADMIN — ACETAMINOPHEN 650 MG: 325 TABLET ORAL at 08:47

## 2024-02-22 RX ADMIN — METRONIDAZOLE 500 MG: 500 INJECTION, SOLUTION INTRAVENOUS at 13:00

## 2024-02-22 RX ADMIN — ACETAMINOPHEN 650 MG: 325 TABLET ORAL at 20:28

## 2024-02-22 RX ADMIN — METRONIDAZOLE 500 MG: 500 INJECTION, SOLUTION INTRAVENOUS at 20:30

## 2024-02-22 RX ADMIN — METOPROLOL TARTRATE 25 MG: 25 TABLET, FILM COATED ORAL at 20:28

## 2024-02-22 RX ADMIN — SODIUM CHLORIDE: 9 INJECTION, SOLUTION INTRAVENOUS at 05:08

## 2024-02-22 RX ADMIN — SODIUM CHLORIDE, PRESERVATIVE FREE 10 ML: 5 INJECTION INTRAVENOUS at 09:05

## 2024-02-22 RX ADMIN — CEFTRIAXONE 1000 MG: 1 INJECTION, POWDER, FOR SOLUTION INTRAMUSCULAR; INTRAVENOUS at 21:53

## 2024-02-22 RX ADMIN — SODIUM CHLORIDE, PRESERVATIVE FREE 10 ML: 5 INJECTION INTRAVENOUS at 21:54

## 2024-02-22 RX ADMIN — METOPROLOL TARTRATE 25 MG: 25 TABLET, FILM COATED ORAL at 08:00

## 2024-02-22 ASSESSMENT — PAIN DESCRIPTION - LOCATION: LOCATION: ABDOMEN

## 2024-02-22 ASSESSMENT — PAIN DESCRIPTION - DESCRIPTORS: DESCRIPTORS: ACHING

## 2024-02-22 ASSESSMENT — PAIN SCALES - GENERAL
PAINLEVEL_OUTOF10: 5
PAINLEVEL_OUTOF10: 5

## 2024-02-22 ASSESSMENT — PAIN DESCRIPTION - ORIENTATION: ORIENTATION: RIGHT;LOWER

## 2024-02-22 ASSESSMENT — PAIN SCALES - WONG BAKER: WONGBAKER_NUMERICALRESPONSE: 0

## 2024-02-22 NOTE — PLAN OF CARE
Problem: ABCDS Injury Assessment  Goal: Absence of physical injury  Outcome: Progressing     Problem: Safety - Adult  Goal: Free from fall injury  Outcome: Progressing     Problem: Pain  Goal: Verbalizes/displays adequate comfort level or baseline comfort level  Outcome: Progressing

## 2024-02-23 LAB
ANION GAP SERPL CALC-SCNC: 10 MMOL/L (ref 3–18)
BUN SERPL-MCNC: 25 MG/DL (ref 7–18)
BUN/CREAT SERPL: 35 (ref 12–20)
CALCIUM SERPL-MCNC: 8.1 MG/DL (ref 8.5–10.1)
CHLORIDE SERPL-SCNC: 110 MMOL/L (ref 100–111)
CO2 SERPL-SCNC: 20 MMOL/L (ref 21–32)
CREAT SERPL-MCNC: 0.72 MG/DL (ref 0.6–1.3)
ERYTHROCYTE [DISTWIDTH] IN BLOOD BY AUTOMATED COUNT: 13.1 % (ref 11.6–14.5)
GLUCOSE SERPL-MCNC: 110 MG/DL (ref 74–99)
HCT VFR BLD AUTO: 33.7 % (ref 35–45)
HGB BLD-MCNC: 11 G/DL (ref 12–16)
MCH RBC QN AUTO: 34 PG (ref 24–34)
MCHC RBC AUTO-ENTMCNC: 32.6 G/DL (ref 31–37)
MCV RBC AUTO: 104 FL (ref 78–100)
NRBC # BLD: 0 K/UL (ref 0–0.01)
NRBC BLD-RTO: 0 PER 100 WBC
PLATELET # BLD AUTO: 230 K/UL (ref 135–420)
PMV BLD AUTO: 9.9 FL (ref 9.2–11.8)
POTASSIUM SERPL-SCNC: 3.8 MMOL/L (ref 3.5–5.5)
RBC # BLD AUTO: 3.24 M/UL (ref 4.2–5.3)
SODIUM SERPL-SCNC: 140 MMOL/L (ref 136–145)
WBC # BLD AUTO: 11.7 K/UL (ref 4.6–13.2)

## 2024-02-23 PROCEDURE — 97530 THERAPEUTIC ACTIVITIES: CPT

## 2024-02-23 PROCEDURE — 1100000000 HC RM PRIVATE

## 2024-02-23 PROCEDURE — 85027 COMPLETE CBC AUTOMATED: CPT

## 2024-02-23 PROCEDURE — 6370000000 HC RX 637 (ALT 250 FOR IP): Performed by: HOSPITALIST

## 2024-02-23 PROCEDURE — 6360000002 HC RX W HCPCS: Performed by: HOSPITALIST

## 2024-02-23 PROCEDURE — 80048 BASIC METABOLIC PNL TOTAL CA: CPT

## 2024-02-23 PROCEDURE — 36415 COLL VENOUS BLD VENIPUNCTURE: CPT

## 2024-02-23 PROCEDURE — 2580000003 HC RX 258: Performed by: HOSPITALIST

## 2024-02-23 PROCEDURE — 97116 GAIT TRAINING THERAPY: CPT

## 2024-02-23 RX ADMIN — METOPROLOL TARTRATE 25 MG: 25 TABLET, FILM COATED ORAL at 09:39

## 2024-02-23 RX ADMIN — METOPROLOL TARTRATE 25 MG: 25 TABLET, FILM COATED ORAL at 21:34

## 2024-02-23 RX ADMIN — METRONIDAZOLE 500 MG: 500 INJECTION, SOLUTION INTRAVENOUS at 12:48

## 2024-02-23 RX ADMIN — ENOXAPARIN SODIUM 40 MG: 100 INJECTION SUBCUTANEOUS at 09:38

## 2024-02-23 RX ADMIN — CEFTRIAXONE 1000 MG: 1 INJECTION, POWDER, FOR SOLUTION INTRAMUSCULAR; INTRAVENOUS at 21:33

## 2024-02-23 RX ADMIN — METRONIDAZOLE 500 MG: 500 INJECTION, SOLUTION INTRAVENOUS at 22:11

## 2024-02-23 RX ADMIN — ACETAMINOPHEN 650 MG: 325 TABLET ORAL at 21:32

## 2024-02-23 RX ADMIN — SODIUM CHLORIDE, PRESERVATIVE FREE 10 ML: 5 INJECTION INTRAVENOUS at 09:39

## 2024-02-23 RX ADMIN — METRONIDAZOLE 500 MG: 500 INJECTION, SOLUTION INTRAVENOUS at 05:06

## 2024-02-23 ASSESSMENT — PAIN DESCRIPTION - ORIENTATION
ORIENTATION: RIGHT;LOWER
ORIENTATION: MID

## 2024-02-23 ASSESSMENT — PAIN DESCRIPTION - DESCRIPTORS: DESCRIPTORS: ACHING

## 2024-02-23 ASSESSMENT — PAIN DESCRIPTION - LOCATION
LOCATION: ABDOMEN
LOCATION: ABDOMEN

## 2024-02-23 ASSESSMENT — PAIN SCALES - GENERAL
PAINLEVEL_OUTOF10: 7
PAINLEVEL_OUTOF10: 5
PAINLEVEL_OUTOF10: 0

## 2024-02-24 ENCOUNTER — APPOINTMENT (OUTPATIENT)
Facility: HOSPITAL | Age: 89
DRG: 372 | End: 2024-02-24
Payer: MEDICARE

## 2024-02-24 PROCEDURE — 2580000003 HC RX 258: Performed by: HOSPITALIST

## 2024-02-24 PROCEDURE — 6370000000 HC RX 637 (ALT 250 FOR IP): Performed by: HOSPITALIST

## 2024-02-24 PROCEDURE — 1100000000 HC RM PRIVATE

## 2024-02-24 PROCEDURE — 71046 X-RAY EXAM CHEST 2 VIEWS: CPT

## 2024-02-24 PROCEDURE — 6360000002 HC RX W HCPCS: Performed by: HOSPITALIST

## 2024-02-24 RX ADMIN — SODIUM CHLORIDE, PRESERVATIVE FREE 10 ML: 5 INJECTION INTRAVENOUS at 12:58

## 2024-02-24 RX ADMIN — METRONIDAZOLE 500 MG: 500 INJECTION, SOLUTION INTRAVENOUS at 22:03

## 2024-02-24 RX ADMIN — ACETAMINOPHEN 650 MG: 325 TABLET ORAL at 10:39

## 2024-02-24 RX ADMIN — METOPROLOL TARTRATE 25 MG: 25 TABLET, FILM COATED ORAL at 10:32

## 2024-02-24 RX ADMIN — METRONIDAZOLE 500 MG: 500 INJECTION, SOLUTION INTRAVENOUS at 12:57

## 2024-02-24 RX ADMIN — ACETAMINOPHEN 650 MG: 325 TABLET ORAL at 16:55

## 2024-02-24 RX ADMIN — CEFTRIAXONE 1000 MG: 1 INJECTION, POWDER, FOR SOLUTION INTRAMUSCULAR; INTRAVENOUS at 21:10

## 2024-02-24 RX ADMIN — METRONIDAZOLE 500 MG: 500 INJECTION, SOLUTION INTRAVENOUS at 05:14

## 2024-02-24 RX ADMIN — ACETAMINOPHEN 650 MG: 325 TABLET ORAL at 05:25

## 2024-02-24 RX ADMIN — ENOXAPARIN SODIUM 40 MG: 100 INJECTION SUBCUTANEOUS at 10:32

## 2024-02-24 RX ADMIN — ACETAMINOPHEN 650 MG: 325 TABLET ORAL at 21:09

## 2024-02-24 RX ADMIN — METOPROLOL TARTRATE 25 MG: 25 TABLET, FILM COATED ORAL at 21:09

## 2024-02-24 ASSESSMENT — PAIN DESCRIPTION - DESCRIPTORS
DESCRIPTORS: ACHING;DISCOMFORT
DESCRIPTORS: DULL;DISCOMFORT
DESCRIPTORS: ACHING

## 2024-02-24 ASSESSMENT — PAIN SCALES - GENERAL
PAINLEVEL_OUTOF10: 5
PAINLEVEL_OUTOF10: 7
PAINLEVEL_OUTOF10: 6
PAINLEVEL_OUTOF10: 8

## 2024-02-24 ASSESSMENT — PAIN DESCRIPTION - ORIENTATION
ORIENTATION: RIGHT;LOWER
ORIENTATION: ANTERIOR;RIGHT
ORIENTATION: MID;UPPER

## 2024-02-24 ASSESSMENT — PAIN DESCRIPTION - LOCATION
LOCATION: ABDOMEN
LOCATION: ABDOMEN;SHOULDER
LOCATION: ABDOMEN

## 2024-02-25 ENCOUNTER — APPOINTMENT (OUTPATIENT)
Facility: HOSPITAL | Age: 89
DRG: 372 | End: 2024-02-25
Payer: MEDICARE

## 2024-02-25 LAB
ANION GAP SERPL CALC-SCNC: 9 MMOL/L (ref 3–18)
BASOPHILS # BLD: 0.1 K/UL (ref 0–0.1)
BASOPHILS NFR BLD: 0 % (ref 0–2)
BUN SERPL-MCNC: 22 MG/DL (ref 7–18)
BUN/CREAT SERPL: 31 (ref 12–20)
CALCIUM SERPL-MCNC: 8.3 MG/DL (ref 8.5–10.1)
CHLORIDE SERPL-SCNC: 110 MMOL/L (ref 100–111)
CO2 SERPL-SCNC: 22 MMOL/L (ref 21–32)
CREAT SERPL-MCNC: 0.71 MG/DL (ref 0.6–1.3)
DIFFERENTIAL METHOD BLD: ABNORMAL
EOSINOPHIL # BLD: 0.2 K/UL (ref 0–0.4)
EOSINOPHIL NFR BLD: 2 % (ref 0–5)
ERYTHROCYTE [DISTWIDTH] IN BLOOD BY AUTOMATED COUNT: 13.2 % (ref 11.6–14.5)
GLUCOSE SERPL-MCNC: 130 MG/DL (ref 74–99)
HCT VFR BLD AUTO: 38.8 % (ref 35–45)
HGB BLD-MCNC: 12.4 G/DL (ref 12–16)
IMM GRANULOCYTES # BLD AUTO: 0.3 K/UL (ref 0–0.04)
IMM GRANULOCYTES NFR BLD AUTO: 2 % (ref 0–0.5)
LYMPHOCYTES # BLD: 0.9 K/UL (ref 0.9–3.6)
LYMPHOCYTES NFR BLD: 8 % (ref 21–52)
MCH RBC QN AUTO: 33.3 PG (ref 24–34)
MCHC RBC AUTO-ENTMCNC: 32 G/DL (ref 31–37)
MCV RBC AUTO: 104.3 FL (ref 78–100)
MONOCYTES # BLD: 1 K/UL (ref 0.05–1.2)
MONOCYTES NFR BLD: 9 % (ref 3–10)
NEUTS SEG # BLD: 8.7 K/UL (ref 1.8–8)
NEUTS SEG NFR BLD: 79 % (ref 40–73)
NRBC # BLD: 0 K/UL (ref 0–0.01)
NRBC BLD-RTO: 0 PER 100 WBC
PLATELET # BLD AUTO: 294 K/UL (ref 135–420)
PMV BLD AUTO: 9.6 FL (ref 9.2–11.8)
POTASSIUM SERPL-SCNC: 3.6 MMOL/L (ref 3.5–5.5)
RBC # BLD AUTO: 3.72 M/UL (ref 4.2–5.3)
SODIUM SERPL-SCNC: 141 MMOL/L (ref 136–145)
WBC # BLD AUTO: 11.1 K/UL (ref 4.6–13.2)

## 2024-02-25 PROCEDURE — 1100000000 HC RM PRIVATE

## 2024-02-25 PROCEDURE — 6360000002 HC RX W HCPCS: Performed by: HOSPITALIST

## 2024-02-25 PROCEDURE — 80048 BASIC METABOLIC PNL TOTAL CA: CPT

## 2024-02-25 PROCEDURE — 74177 CT ABD & PELVIS W/CONTRAST: CPT

## 2024-02-25 PROCEDURE — 97530 THERAPEUTIC ACTIVITIES: CPT

## 2024-02-25 PROCEDURE — 85025 COMPLETE CBC W/AUTO DIFF WBC: CPT

## 2024-02-25 PROCEDURE — 6360000004 HC RX CONTRAST MEDICATION: Performed by: SURGERY

## 2024-02-25 PROCEDURE — 36415 COLL VENOUS BLD VENIPUNCTURE: CPT

## 2024-02-25 PROCEDURE — 2580000003 HC RX 258: Performed by: HOSPITALIST

## 2024-02-25 PROCEDURE — 97116 GAIT TRAINING THERAPY: CPT

## 2024-02-25 PROCEDURE — 6370000000 HC RX 637 (ALT 250 FOR IP): Performed by: HOSPITALIST

## 2024-02-25 RX ADMIN — ACETAMINOPHEN 650 MG: 325 TABLET ORAL at 04:05

## 2024-02-25 RX ADMIN — ACETAMINOPHEN 650 MG: 325 TABLET ORAL at 21:59

## 2024-02-25 RX ADMIN — ACETAMINOPHEN 650 MG: 325 TABLET ORAL at 10:19

## 2024-02-25 RX ADMIN — METRONIDAZOLE 500 MG: 500 INJECTION, SOLUTION INTRAVENOUS at 22:58

## 2024-02-25 RX ADMIN — METOPROLOL TARTRATE 25 MG: 25 TABLET, FILM COATED ORAL at 21:56

## 2024-02-25 RX ADMIN — ENOXAPARIN SODIUM 40 MG: 100 INJECTION SUBCUTANEOUS at 09:11

## 2024-02-25 RX ADMIN — METRONIDAZOLE 500 MG: 500 INJECTION, SOLUTION INTRAVENOUS at 04:06

## 2024-02-25 RX ADMIN — CEFTRIAXONE 1000 MG: 1 INJECTION, POWDER, FOR SOLUTION INTRAMUSCULAR; INTRAVENOUS at 21:56

## 2024-02-25 RX ADMIN — IOHEXOL 50 ML: 240 INJECTION, SOLUTION INTRATHECAL; INTRAVASCULAR; INTRAVENOUS; ORAL at 17:11

## 2024-02-25 RX ADMIN — SODIUM CHLORIDE, PRESERVATIVE FREE 10 ML: 5 INJECTION INTRAVENOUS at 09:12

## 2024-02-25 RX ADMIN — IOPAMIDOL 100 ML: 612 INJECTION, SOLUTION INTRAVENOUS at 17:10

## 2024-02-25 RX ADMIN — METOPROLOL TARTRATE 25 MG: 25 TABLET, FILM COATED ORAL at 09:10

## 2024-02-25 RX ADMIN — METRONIDAZOLE 500 MG: 500 INJECTION, SOLUTION INTRAVENOUS at 14:40

## 2024-02-25 ASSESSMENT — PAIN SCALES - WONG BAKER
WONGBAKER_NUMERICALRESPONSE: 0
WONGBAKER_NUMERICALRESPONSE: 0
WONGBAKER_NUMERICALRESPONSE: 6
WONGBAKER_NUMERICALRESPONSE: 0
WONGBAKER_NUMERICALRESPONSE: 0

## 2024-02-25 ASSESSMENT — PAIN DESCRIPTION - ORIENTATION
ORIENTATION: RIGHT;LOWER;UPPER
ORIENTATION: RIGHT

## 2024-02-25 ASSESSMENT — PAIN SCALES - GENERAL
PAINLEVEL_OUTOF10: 0
PAINLEVEL_OUTOF10: 7
PAINLEVEL_OUTOF10: 0
PAINLEVEL_OUTOF10: 7
PAINLEVEL_OUTOF10: 7

## 2024-02-25 ASSESSMENT — PAIN DESCRIPTION - DIRECTION: RADIATING_TOWARDS: R

## 2024-02-25 ASSESSMENT — PAIN DESCRIPTION - DESCRIPTORS
DESCRIPTORS: DISCOMFORT;NAGGING
DESCRIPTORS: DISCOMFORT;ACHING

## 2024-02-25 ASSESSMENT — PAIN DESCRIPTION - LOCATION
LOCATION: ABDOMEN
LOCATION: ABDOMEN

## 2024-02-25 ASSESSMENT — PAIN - FUNCTIONAL ASSESSMENT: PAIN_FUNCTIONAL_ASSESSMENT: ACTIVITIES ARE NOT PREVENTED

## 2024-02-25 ASSESSMENT — PAIN DESCRIPTION - ONSET: ONSET: UNABLE TO COMMUNICATE

## 2024-02-25 ASSESSMENT — PAIN DESCRIPTION - PAIN TYPE: TYPE: ACUTE PAIN

## 2024-02-25 ASSESSMENT — PAIN DESCRIPTION - FREQUENCY: FREQUENCY: INTERMITTENT

## 2024-02-26 LAB
ANION GAP SERPL CALC-SCNC: 7 MMOL/L (ref 3–18)
BASOPHILS # BLD: 0.1 K/UL (ref 0–0.1)
BASOPHILS NFR BLD: 1 % (ref 0–2)
BUN SERPL-MCNC: 18 MG/DL (ref 7–18)
BUN/CREAT SERPL: 25 (ref 12–20)
CALCIUM SERPL-MCNC: 8.1 MG/DL (ref 8.5–10.1)
CHLORIDE SERPL-SCNC: 111 MMOL/L (ref 100–111)
CO2 SERPL-SCNC: 22 MMOL/L (ref 21–32)
CREAT SERPL-MCNC: 0.72 MG/DL (ref 0.6–1.3)
DIFFERENTIAL METHOD BLD: ABNORMAL
EOSINOPHIL # BLD: 0.3 K/UL (ref 0–0.4)
EOSINOPHIL NFR BLD: 3 % (ref 0–5)
ERYTHROCYTE [DISTWIDTH] IN BLOOD BY AUTOMATED COUNT: 13.4 % (ref 11.6–14.5)
GLUCOSE SERPL-MCNC: 107 MG/DL (ref 74–99)
HCT VFR BLD AUTO: 35.4 % (ref 35–45)
HGB BLD-MCNC: 11.8 G/DL (ref 12–16)
IMM GRANULOCYTES # BLD AUTO: 0.5 K/UL (ref 0–0.04)
IMM GRANULOCYTES NFR BLD AUTO: 5 % (ref 0–0.5)
LYMPHOCYTES # BLD: 1.1 K/UL (ref 0.9–3.6)
LYMPHOCYTES NFR BLD: 11 % (ref 21–52)
MCH RBC QN AUTO: 33.8 PG (ref 24–34)
MCHC RBC AUTO-ENTMCNC: 33.3 G/DL (ref 31–37)
MCV RBC AUTO: 101.4 FL (ref 78–100)
MONOCYTES # BLD: 1 K/UL (ref 0.05–1.2)
MONOCYTES NFR BLD: 10 % (ref 3–10)
NEUTS SEG # BLD: 7.3 K/UL (ref 1.8–8)
NEUTS SEG NFR BLD: 72 % (ref 40–73)
NRBC # BLD: 0 K/UL (ref 0–0.01)
NRBC BLD-RTO: 0 PER 100 WBC
PLATELET # BLD AUTO: 345 K/UL (ref 135–420)
PMV BLD AUTO: 10.1 FL (ref 9.2–11.8)
POTASSIUM SERPL-SCNC: 4.1 MMOL/L (ref 3.5–5.5)
RBC # BLD AUTO: 3.49 M/UL (ref 4.2–5.3)
SODIUM SERPL-SCNC: 140 MMOL/L (ref 136–145)
WBC # BLD AUTO: 10.1 K/UL (ref 4.6–13.2)

## 2024-02-26 PROCEDURE — 80048 BASIC METABOLIC PNL TOTAL CA: CPT

## 2024-02-26 PROCEDURE — 36415 COLL VENOUS BLD VENIPUNCTURE: CPT

## 2024-02-26 PROCEDURE — 1100000000 HC RM PRIVATE

## 2024-02-26 PROCEDURE — 2580000003 HC RX 258: Performed by: HOSPITALIST

## 2024-02-26 PROCEDURE — 97116 GAIT TRAINING THERAPY: CPT

## 2024-02-26 PROCEDURE — 2700000000 HC OXYGEN THERAPY PER DAY

## 2024-02-26 PROCEDURE — 6370000000 HC RX 637 (ALT 250 FOR IP): Performed by: HOSPITALIST

## 2024-02-26 PROCEDURE — 6360000002 HC RX W HCPCS: Performed by: HOSPITALIST

## 2024-02-26 PROCEDURE — 85025 COMPLETE CBC W/AUTO DIFF WBC: CPT

## 2024-02-26 PROCEDURE — 97530 THERAPEUTIC ACTIVITIES: CPT

## 2024-02-26 RX ADMIN — CEFTRIAXONE 1000 MG: 1 INJECTION, POWDER, FOR SOLUTION INTRAMUSCULAR; INTRAVENOUS at 20:13

## 2024-02-26 RX ADMIN — ACETAMINOPHEN 650 MG: 325 TABLET ORAL at 20:06

## 2024-02-26 RX ADMIN — METOPROLOL TARTRATE 25 MG: 25 TABLET, FILM COATED ORAL at 20:06

## 2024-02-26 RX ADMIN — METRONIDAZOLE 500 MG: 500 INJECTION, SOLUTION INTRAVENOUS at 04:35

## 2024-02-26 RX ADMIN — SODIUM CHLORIDE, PRESERVATIVE FREE 10 ML: 5 INJECTION INTRAVENOUS at 08:34

## 2024-02-26 RX ADMIN — METRONIDAZOLE 500 MG: 500 INJECTION, SOLUTION INTRAVENOUS at 13:45

## 2024-02-26 RX ADMIN — METRONIDAZOLE 500 MG: 500 INJECTION, SOLUTION INTRAVENOUS at 21:19

## 2024-02-26 RX ADMIN — SODIUM CHLORIDE, PRESERVATIVE FREE 10 ML: 5 INJECTION INTRAVENOUS at 21:20

## 2024-02-26 RX ADMIN — METOPROLOL TARTRATE 25 MG: 25 TABLET, FILM COATED ORAL at 08:34

## 2024-02-26 RX ADMIN — ENOXAPARIN SODIUM 40 MG: 100 INJECTION SUBCUTANEOUS at 08:33

## 2024-02-26 ASSESSMENT — PAIN SCALES - GENERAL: PAINLEVEL_OUTOF10: 4

## 2024-02-26 ASSESSMENT — PAIN DESCRIPTION - LOCATION: LOCATION: ABDOMEN

## 2024-02-26 NOTE — WOUND CARE
Wound Care Note:    Chart audited for length of stay of 5 days, patient with high risk for skin breakdown, no documented wounds at time of audit.     Skin Care & Pressure Relief Recommendations  Minimize layers of linen  Pads under patient to optimize support surface  Turn/reposition approximately every 2 hours  Pillow wedges  Manage incontinence   Promote continence; Skin Protective lotion/cream to buttocks and sacrum daily and as needed with incontinence care  Offload heels pillows    Consult wound care if any wounds/ skin care needs noted during admission

## 2024-02-26 NOTE — CONSULTS
INTERVENTIONAL RADIOLOGY  Consult Note    Patient:  Alesha Montemayor  :  3/22/1927  Age:  89 y.o.  MRN:  [id number]    Today's Date:  2024  Admission Date:  2024  Hospital Day:  6  Consult requested by:  Estephanie Dillon DO      CC / HPI   Alesha Montemayor is a 89 y.o. female with a history of appendicitis with perforation treated with conservative medical management, periappendiceal fluid on CT.    IR consulted to evaluate for possible fluid drainage.    Patient seen at bedside with daughter. Patient reports she feels better today, no pain while sitting still some RLQ pain with deep inspiration or movement.    PAST MEDICAL HISTORY  Past Medical History:   Diagnosis Date    A-fib (HCC)     Cancer (HCC)     Diverticulitis     Hypertension     Hypothyroid     Severe aortic stenosis        PAST SURGICAL HISTORY  Past Surgical History:   Procedure Laterality Date    MASTECTOMY      right    ORTHOPEDIC SURGERY      shoulder       CURRENT MEDICATIONS  Current Facility-Administered Medications   Medication Dose Route Frequency Provider Last Rate Last Admin    metoprolol tartrate (LOPRESSOR) tablet 25 mg  25 mg Oral BID Jose Rafael Grey MD   25 mg at 24 0834    sodium chloride flush 0.9 % injection 5-40 mL  5-40 mL IntraVENous 2 times per day Jose Rafael Grey MD   10 mL at 24 0834    sodium chloride flush 0.9 % injection 5-40 mL  5-40 mL IntraVENous PRN Jose Rafael Grey MD        0.9 % sodium chloride infusion   IntraVENous PRN Jose Rafael Grey MD        potassium chloride (KLOR-CON M) extended release tablet 40 mEq  40 mEq Oral PRN Jose Rafael Grey MD        Or    potassium bicarb-citric acid (EFFER-K) effervescent tablet 40 mEq  40 mEq Oral PRN Jose Rafael Grey MD        Or    potassium chloride 10 mEq/100 mL IVPB (Peripheral Line)  10 mEq IntraVENous PRN Jose Rafael Grey MD        magnesium sulfate 2000 mg in 50 mL IVPB 
Palliative Medicine Consult  [facility]: [phone])  [facility]: [phone]     Patient Name: Alesha Montemayor  YOB: 1927    Date of Initial Consult: 2/21/2024  Reason for Consult: goals of care discussion/ACP/symptoms management  Requesting Provider:  Jose Rafael Grey MD  Primary Care Physician: Khushi Galan DO      SUMMARY:     Alesha Montemayor is a 89 y.o. with a past history of hypertension, atrial fibrillation, severe aortic stenosis and hypothyroidism, who was admitted on 2/20/2024 from [facility] with a diagnosis of acute appendicitis with perforation.  Patient initially presented to emergency room with complaint of abdominal pain for last 3 days.  In the emergency room, CT scan showed patient having appendicitis with partial contained rupture without any abscess.  Patient was seen by general surgeon who recommended conservative management as patient is high risk for any surgical intervention.  Patient has been on Lopressor for A-fib.  Current medical issues leading to Palliative Medicine involvement include:.  To establish goals of care.    2/21/2024: Patient continues to have some abdominal pain without any nausea or vomiting.  No shortness of breath or cough.  She has chronic right shoulder pain secondary to arthritis.  No headaches or dizziness.     HISTORY:     History obtained from: Patient    CHIEF COMPLAINT: Abdominal pain    HPI/SUBJECTIVE:    The patient is:   [x] Verbal and participatory  [] Non-participatory due to:         PHYSICAL EXAM:     From RN flowsheet:  Wt Readings from Last 3 Encounters:   02/21/24 75.1 kg (165 lb 8 oz)   03/20/23 73.4 kg (161 lb 13.1 oz)       BP (!) 120/53   Pulse 77   Temp 98.5 °F (36.9 °C) (Oral)   Resp 16   Ht 1.549 m (5' 1\")   Wt 75.1 kg (165 lb 8 oz)   SpO2 99%   BMI 31.27 kg/m²     Constitutional: Awake, follows all commands appropriately, nasal cannula in situ, 
serious this is and pt may die of sepsis without surgery or of sudden cardiac collapse with surgery.  They agree with not starting with surgery and seeing the antibiotic effect.

## 2024-02-27 LAB
ANION GAP SERPL CALC-SCNC: 7 MMOL/L (ref 3–18)
BASOPHILS # BLD: 0.1 K/UL (ref 0–0.1)
BASOPHILS NFR BLD: 1 % (ref 0–2)
BUN SERPL-MCNC: 22 MG/DL (ref 7–18)
BUN/CREAT SERPL: 30 (ref 12–20)
CALCIUM SERPL-MCNC: 8 MG/DL (ref 8.5–10.1)
CHLORIDE SERPL-SCNC: 111 MMOL/L (ref 100–111)
CO2 SERPL-SCNC: 23 MMOL/L (ref 21–32)
CREAT SERPL-MCNC: 0.73 MG/DL (ref 0.6–1.3)
DIFFERENTIAL METHOD BLD: ABNORMAL
EOSINOPHIL # BLD: 0.1 K/UL (ref 0–0.4)
EOSINOPHIL NFR BLD: 1 % (ref 0–5)
ERYTHROCYTE [DISTWIDTH] IN BLOOD BY AUTOMATED COUNT: 13.6 % (ref 11.6–14.5)
GLUCOSE SERPL-MCNC: 101 MG/DL (ref 74–99)
HCT VFR BLD AUTO: 34.3 % (ref 35–45)
HGB BLD-MCNC: 11.3 G/DL (ref 12–16)
IMM GRANULOCYTES # BLD AUTO: 0 K/UL
IMM GRANULOCYTES NFR BLD AUTO: 0 %
LYMPHOCYTES # BLD: 1.3 K/UL (ref 0.9–3.6)
LYMPHOCYTES NFR BLD: 14 % (ref 21–52)
MCH RBC QN AUTO: 33.8 PG (ref 24–34)
MCHC RBC AUTO-ENTMCNC: 32.9 G/DL (ref 31–37)
MCV RBC AUTO: 102.7 FL (ref 78–100)
METAMYELOCYTES NFR BLD MANUAL: 1 %
MONOCYTES # BLD: 0.5 K/UL (ref 0.05–1.2)
MONOCYTES NFR BLD: 6 % (ref 3–10)
MYELOCYTES NFR BLD MANUAL: 1 %
NEUTS BAND NFR BLD MANUAL: 4 % (ref 0–5)
NEUTS SEG # BLD: 6.8 K/UL (ref 1.8–8)
NEUTS SEG NFR BLD: 72 % (ref 40–73)
NRBC # BLD: 0 K/UL (ref 0–0.01)
NRBC BLD-RTO: 0 PER 100 WBC
PLATELET # BLD AUTO: 332 K/UL (ref 135–420)
PLATELET COMMENT: ABNORMAL
PMV BLD AUTO: 10.4 FL (ref 9.2–11.8)
POTASSIUM SERPL-SCNC: 4.3 MMOL/L (ref 3.5–5.5)
RBC # BLD AUTO: 3.34 M/UL (ref 4.2–5.3)
RBC MORPH BLD: ABNORMAL
SODIUM SERPL-SCNC: 141 MMOL/L (ref 136–145)
WBC # BLD AUTO: 9 K/UL (ref 4.6–13.2)

## 2024-02-27 PROCEDURE — 6360000002 HC RX W HCPCS: Performed by: HOSPITALIST

## 2024-02-27 PROCEDURE — 1100000000 HC RM PRIVATE

## 2024-02-27 PROCEDURE — 80048 BASIC METABOLIC PNL TOTAL CA: CPT

## 2024-02-27 PROCEDURE — 6370000000 HC RX 637 (ALT 250 FOR IP): Performed by: HOSPITALIST

## 2024-02-27 PROCEDURE — 85025 COMPLETE CBC W/AUTO DIFF WBC: CPT

## 2024-02-27 PROCEDURE — 36415 COLL VENOUS BLD VENIPUNCTURE: CPT

## 2024-02-27 PROCEDURE — 2580000003 HC RX 258: Performed by: HOSPITALIST

## 2024-02-27 PROCEDURE — 97116 GAIT TRAINING THERAPY: CPT

## 2024-02-27 RX ADMIN — METRONIDAZOLE 500 MG: 500 INJECTION, SOLUTION INTRAVENOUS at 21:13

## 2024-02-27 RX ADMIN — METOPROLOL TARTRATE 25 MG: 25 TABLET, FILM COATED ORAL at 21:13

## 2024-02-27 RX ADMIN — METRONIDAZOLE 500 MG: 500 INJECTION, SOLUTION INTRAVENOUS at 12:42

## 2024-02-27 RX ADMIN — SODIUM CHLORIDE, PRESERVATIVE FREE 10 ML: 5 INJECTION INTRAVENOUS at 21:18

## 2024-02-27 RX ADMIN — ENOXAPARIN SODIUM 40 MG: 100 INJECTION SUBCUTANEOUS at 08:30

## 2024-02-27 RX ADMIN — CEFTRIAXONE 1000 MG: 1 INJECTION, POWDER, FOR SOLUTION INTRAMUSCULAR; INTRAVENOUS at 21:12

## 2024-02-27 RX ADMIN — METOPROLOL TARTRATE 25 MG: 25 TABLET, FILM COATED ORAL at 08:34

## 2024-02-27 RX ADMIN — ACETAMINOPHEN 650 MG: 325 TABLET ORAL at 08:35

## 2024-02-27 RX ADMIN — SODIUM CHLORIDE, PRESERVATIVE FREE 10 ML: 5 INJECTION INTRAVENOUS at 08:39

## 2024-02-27 RX ADMIN — METRONIDAZOLE 500 MG: 500 INJECTION, SOLUTION INTRAVENOUS at 04:33

## 2024-02-27 RX ADMIN — ACETAMINOPHEN 650 MG: 325 TABLET ORAL at 21:15

## 2024-02-27 ASSESSMENT — PAIN SCALES - GENERAL
PAINLEVEL_OUTOF10: 0
PAINLEVEL_OUTOF10: 1

## 2024-02-27 ASSESSMENT — PAIN DESCRIPTION - DESCRIPTORS: DESCRIPTORS: PATIENT UNABLE TO DESCRIBE

## 2024-02-27 ASSESSMENT — PAIN - FUNCTIONAL ASSESSMENT
PAIN_FUNCTIONAL_ASSESSMENT: ACTIVITIES ARE NOT PREVENTED
PAIN_FUNCTIONAL_ASSESSMENT: ACTIVITIES ARE NOT PREVENTED

## 2024-02-27 ASSESSMENT — PAIN DESCRIPTION - ORIENTATION
ORIENTATION: RIGHT
ORIENTATION: POSTERIOR

## 2024-02-27 ASSESSMENT — PAIN SCALES - WONG BAKER
WONGBAKER_NUMERICALRESPONSE: 0

## 2024-02-27 ASSESSMENT — PAIN DESCRIPTION - LOCATION
LOCATION: BACK
LOCATION: ABDOMEN

## 2024-02-27 NOTE — PLAN OF CARE
Problem: ABCDS Injury Assessment  Goal: Absence of physical injury  Outcome: Progressing     Problem: Safety - Adult  Goal: Free from fall injury  Outcome: Progressing     Problem: Pain  Goal: Verbalizes/displays adequate comfort level or baseline comfort level  Outcome: Progressing     Problem: Chronic Conditions and Co-morbidities  Goal: Patient's chronic conditions and co-morbidity symptoms are monitored and maintained or improved  Outcome: Progressing     Problem: Skin/Tissue Integrity  Goal: Absence of new skin breakdown  Description: 1.  Monitor for areas of redness and/or skin breakdown  2.  Assess vascular access sites hourly  3.  Every 4-6 hours minimum:  Change oxygen saturation probe site  4.  Every 4-6 hours:  If on nasal continuous positive airway pressure, respiratory therapy assess nares and determine need for appliance change or resting period.  Outcome: Progressing

## 2024-02-28 VITALS
SYSTOLIC BLOOD PRESSURE: 112 MMHG | DIASTOLIC BLOOD PRESSURE: 64 MMHG | RESPIRATION RATE: 18 BRPM | TEMPERATURE: 97 F | HEIGHT: 61 IN | BODY MASS INDEX: 33.3 KG/M2 | WEIGHT: 176.4 LBS | OXYGEN SATURATION: 97 % | HEART RATE: 64 BPM

## 2024-02-28 PROCEDURE — 6360000002 HC RX W HCPCS: Performed by: HOSPITALIST

## 2024-02-28 PROCEDURE — 2580000003 HC RX 258: Performed by: HOSPITALIST

## 2024-02-28 PROCEDURE — 6370000000 HC RX 637 (ALT 250 FOR IP): Performed by: HOSPITALIST

## 2024-02-28 RX ORDER — METRONIDAZOLE 500 MG/1
500 TABLET ORAL 3 TIMES DAILY
Qty: 21 TABLET | Refills: 0 | Status: SHIPPED | OUTPATIENT
Start: 2024-02-28 | End: 2024-03-06

## 2024-02-28 RX ORDER — SULFAMETHOXAZOLE AND TRIMETHOPRIM 800; 160 MG/1; MG/1
1 TABLET ORAL 2 TIMES DAILY
Qty: 14 TABLET | Refills: 0 | Status: SHIPPED | OUTPATIENT
Start: 2024-02-28 | End: 2024-03-06

## 2024-02-28 RX ORDER — FUROSEMIDE 40 MG/1
40 TABLET ORAL ONCE
Status: DISCONTINUED | OUTPATIENT
Start: 2024-02-28 | End: 2024-02-28 | Stop reason: HOSPADM

## 2024-02-28 RX ADMIN — METOPROLOL TARTRATE 25 MG: 25 TABLET, FILM COATED ORAL at 08:39

## 2024-02-28 RX ADMIN — METRONIDAZOLE 500 MG: 500 INJECTION, SOLUTION INTRAVENOUS at 05:26

## 2024-02-28 RX ADMIN — SODIUM CHLORIDE, PRESERVATIVE FREE 10 ML: 5 INJECTION INTRAVENOUS at 08:40

## 2024-02-28 RX ADMIN — ENOXAPARIN SODIUM 40 MG: 100 INJECTION SUBCUTANEOUS at 08:39

## 2024-02-28 RX ADMIN — ACETAMINOPHEN 650 MG: 325 TABLET ORAL at 08:39

## 2024-02-28 ASSESSMENT — PAIN SCALES - GENERAL: PAINLEVEL_OUTOF10: 0

## 2024-02-28 NOTE — DISCHARGE SUMMARY
results found for: \"CHOL\", \"CHOLPOCT\", \"CHOLX\", \"CHLST\", \"CHOLV\", \"312825\", \"HDL\", \"HDLC\", \"LDL\", \"LDLC\", \"628392\", \"VLDLC\", \"VLDL\", \"TGLX\", \"TRIGL\"   BNP Invalid input(s): \"BNPP\"   Liver Enzymes No results for input(s): \"TP\", \"ALB\" in the last 72 hours.    Invalid input(s): \"TBIL\", \"AP\", \"SGOT\", \"GPT\", \"DBIL\"   Thyroid Studies Lab Results   Component Value Date/Time    TSH 6.23 01/02/2023 01:45 PM            Significant Diagnostic Studies: CT ABDOMEN PELVIS W IV CONTRAST Additional Contrast? Oral    Result Date: 2/25/2024  INDICATION: follow up perforated appendicitis r/o drainable abscess COMPARISON: 2/20/2024 TECHNIQUE: Thin axial images were obtained through the abdomen and pelvis with intravenous iodinated contrast administration. Coronal and sagittal reconstructions were generated. Oral contrast was not administered. CT dose reduction was achieved through use of a standardized protocol tailored for this examination and automatic exposure control for dose modulation. FINDINGS: LIVER: No mass or biliary dilatation. GALLBLADDER: Surgically absent SPLEEN: Numerous subcentimeter low density lesions again seen PANCREAS: No mass or ductal dilatation. ADRENALS: Mild cortical scarring. No abnormal enhancing renal mass. No hydronephrosis. KIDNEYS/URETERS: Normal symmetric nephrograms without evidence for  focal mass. No hydronephrosis PERITONEUM: No abdominal lymphadenopathy or ascites. COLON: Diverticulosis APPENDIX: No inflammatory changes again seen adjacent to the appendix with increased periappendiceal focal fluid measuring 3.5 x 2.6 cm. This has not walled off into a discrete abscess that would likely benefit from percutaneous aspiration. SMALL BOWEL: No dilatation or wall thickening. STOMACH: Unremarkable. PELVIS: No pelvic lymphadenopathy or free fluid. BONES: L3 vertebral cement augmentation. Mild to moderate degenerative changes in the spine. VISUALIZED THORAX: No significant abnormality ADDITIONAL COMMENTS:

## 2024-02-28 NOTE — PROGRESS NOTES
Surgery Progress Note    Patient: Alesha Montemayor MRN: [id number]  CSN: [id number]    YOB: 1927  Age: 89 y.o.  Sex: female    DOA: 2/20/2024 LOS:  LOS: 3 days          Chief Complaint:          Assessment/Plan     Improving.  WBC normalizing.  Continue IV antibiotics through weekend.  Advance diet to regular.  PT/OT.  If improvement stops or worsens would repeat CT scan on Sunday.  If continues to improve - no reason to rescan.    Patient Active Problem List   Diagnosis    Rib contusion, right, initial encounter    Right shoulder injury, initial encounter    Fall    Acute exacerbation of CHF (congestive heart failure) (HCC)    HTN (hypertension)    Hypothyroidism    New onset atrial fibrillation (HCC)    Aortic stenosis    Hypomagnesemia    Multiple rib fractures    Chronic congestive heart failure (HCC)    Chronic atrial fibrillation (HCC)    Multiple falls    Frail elderly    ACP (advance care planning)    Acute appendicitis with perforation and localized peritonitis       Subjective:        Review of systems:    Constitutional: denies fevers, chills, myalgias  Respiratory: denies SOB, cough  Cardiovascular: denies chest pain, palpitations  Gastrointestinal: denies nausea, vomiting, diarrhea      Vital signs/Intake and Output:  Visit Vitals  BP (!) 152/80   Pulse 89   Temp 97.5 °F (36.4 °C) (Oral)   Resp 18   Ht 1.549 m (5' 1\")   Wt 75.1 kg (165 lb 8 oz)   SpO2 93%   BMI 31.27 kg/m²     Current Shift:  No intake/output data recorded.  Last three shifts:  02/21 1901 - 02/23 0700  In: 720 [P.O.:720]  Out: 1050 [Urine:1050]    Exam:    General: Well developed, alert, NAD, OX3  CVS:Regular rate and rhythm,   Lungs:Clear to auscultation bilaterally, no wheezes,  Abdomen: Soft, less tender, No distention, Normal Bowel sounds, No hepatomegaly  Extremities: No C/C/E,  Skin:normal texture and turgor, no rashes, no lesions  Neuro:grossly normal , follows commands  Psych:appropriate                Labs: 
    Surgery Progress Note    Patient: Alesha Montemayor MRN: [id number]  CSN: [id number]    YOB: 1927  Age: 89 y.o.  Sex: female    DOA: 2/20/2024 LOS:  LOS: 4 days          Chief Complaint:          Assessment/Plan     Improving.  WBC normalizing.  Continue IV antibiotics through weekend.  Advance diet to regular.  PT/OT.  If improvement stops or worsens would repeat CT scan on Sunday.  If continues to improve - no reason to rescan.    Stable.  Tolerating diet.  Slight improvement. Pt with c/o cough.  H/O CHF and H/O recent rib fracture.  Will get CXR - hosptialist aware to f/u    Patient Active Problem List   Diagnosis    Rib contusion, right, initial encounter    Right shoulder injury, initial encounter    Fall    Acute exacerbation of CHF (congestive heart failure) (HCC)    HTN (hypertension)    Hypothyroidism    New onset atrial fibrillation (HCC)    Aortic stenosis    Hypomagnesemia    Multiple rib fractures    Chronic congestive heart failure (HCC)    Chronic atrial fibrillation (HCC)    Multiple falls    Frail elderly    ACP (advance care planning)    Acute appendicitis with perforation and localized peritonitis       Subjective:        Review of systems:    Constitutional: denies fevers, chills, myalgias  Respiratory: denies SOB, cough  Cardiovascular: denies chest pain, palpitations  Gastrointestinal: denies nausea, vomiting, diarrhea      Vital signs/Intake and Output:  Visit Vitals  /78   Pulse 96   Temp 97.8 °F (36.6 °C) (Oral)   Resp 18   Ht 1.549 m (5' 1\")   Wt 79.6 kg (175 lb 9 oz)   SpO2 96%   BMI 33.17 kg/m²     Current Shift:  02/24 0701 - 02/24 1900  In: 120 [P.O.:120]  Out: -   Last three shifts:  02/22 1901 - 02/24 0700  In: 120 [P.O.:120]  Out: 200 [Urine:200]    Exam:    General: Well developed, alert, NAD, OX3  CVS:Regular rate and rhythm,   Lungs:Clear to auscultation bilaterally, no wheezes,  Abdomen: Soft, less tender, No distention, Normal Bowel sounds, No 
    Surgery Progress Note    Patient: Alesha Montemayor MRN: [id number]  CSN: [id number]    YOB: 1927  Age: 89 y.o.  Sex: female    DOA: 2/20/2024 LOS:  LOS: 7 days          Chief Complaint:          Assessment/Plan     Continues to improve.  Discharge planning home in next 1-2 days on oral antibiotics x 10 days    Patient Active Problem List   Diagnosis    Rib contusion, right, initial encounter    Right shoulder injury, initial encounter    Fall    Acute exacerbation of CHF (congestive heart failure) (HCC)    HTN (hypertension)    Hypothyroidism    New onset atrial fibrillation (HCC)    Aortic stenosis    Hypomagnesemia    Multiple rib fractures    Chronic congestive heart failure (HCC)    Chronic atrial fibrillation (HCC)    Multiple falls    Frail elderly    ACP (advance care planning)    Acute appendicitis with perforation and localized peritonitis       Subjective:    Sitting in chair, tolerating diet, having BM. Doing well.    Review of systems:    Constitutional: denies fevers, chills, myalgias  Respiratory: denies SOB, cough  Cardiovascular: denies chest pain, palpitations  Gastrointestinal: denies nausea, vomiting, diarrhea      Vital signs/Intake and Output:  Visit Vitals  /67   Pulse 83   Temp 98 °F (36.7 °C) (Oral)   Resp 17   Ht 1.549 m (5' 1\")   Wt 80 kg (176 lb 6.4 oz)   SpO2 97%   BMI 33.33 kg/m²     Current Shift:  No intake/output data recorded.  Last three shifts:  02/26 0701 - 02/27 1900  In: 240 [P.O.:240]  Out: -     Exam:    General: Well developed, alert, NAD, OX3  Head/Neck: NCAT, supple, No masses, No lymphadenopathy  CVS:Regular rate and rhythm,   Lungs:Clear to auscultation bilaterally, no wheezes,  Abdomen: Soft, minimally tender much improved, No distention, Normal Bowel sounds, No hepatomegaly  Extremities: No C/C/E, pulses palpable 2+  Skin:normal texture and turgor, no rashes, no lesions  Neuro:grossly normal , follows commands  Psych:appropriate            
  Hospitalist Progress Note    Patient: Alesha Montemayor MRN: [id number]  CSN: [id number]    YOB: 1927  Age: 89 y.o.  Sex: female    DOA: 2/20/2024 LOS:  LOS: 3 days                Assessment/Plan     Active Hospital Problems    Diagnosis     Chronic congestive heart failure (HCC) [I50.9]      Priority: Medium    Chronic atrial fibrillation (HCC) [I48.20]      Priority: Medium    Acute appendicitis with perforation and localized peritonitis [K35.32]     Aortic stenosis [I35.0]     HTN (hypertension) [I10]     Hypothyroidism [E03.9]         Chief complaint :  Abdominal pain   89 y.o. female with HTN, A-fib, severe aortic stenosis, hypothyroidism, who lives at assisted living, presents to ER with concerns of abdominal pain.    Acute appendicitis with perforation -  Seen by general surgery  Given severe aortic stenosis, age, she is high risk for surgical intervention.  Recommend conservative management.  IV antibiotics  Pain control  Gentle IVF  Clear liquid diet.     A-fib -  Continue with lopressor  Not on anticoagulation at home  Monitor rate     Chronic CHF -  Cautious with IVF  Last echo 01/03/2023 with EF of 60-65%, severe aortic stenosis.     HTN -  Monitor BP    Discussed with daughter at bedside.    Disposition : TBD    Review of systems  General: No fevers or chills.  Cardiovascular: No chest pain or pressure. No palpitations.   Pulmonary: No shortness of breath.   Gastrointestinal: see above.     Physical Exam:  General: Awake, cooperative, no acute distress    HEENT: NC, Atraumatic.  PERRLA, anicteric sclerae.  Lungs: CTA Bilaterally. No Wheezing/Rhonchi/Rales.  Heart:  S1 S2,  + murmur, No Rubs, No Gallops  Abdomen: Soft, Non distended, RLQ tender.  +Bowel sounds,   Extremities: No c/c/e  Psych:   Not anxious or agitated.  Neurologic:  No acute neurological deficit.         Vital signs/Intake and Output:  Visit Vitals  /68   Pulse 93   Temp 98.3 °F (36.8 °C) (Oral)   Resp 18   Ht 1.549 
  Hospitalist Progress Note    Patient: Alesha Montemayor MRN: [id number]  CSN: [id number]    YOB: 1927  Age: 89 y.o.  Sex: female    DOA: 2/20/2024 LOS:  LOS: 1 day                Assessment/Plan     Active Hospital Problems    Diagnosis     Chronic congestive heart failure (HCC) [I50.9]      Priority: Medium    Chronic atrial fibrillation (HCC) [I48.20]      Priority: Medium    Acute appendicitis with perforation and localized peritonitis [K35.32]     Aortic stenosis [I35.0]     HTN (hypertension) [I10]     Hypothyroidism [E03.9]         Chief complaint :  Abdominal pain   89 y.o. female with HTN, A-fib, severe aortic stenosis, hypothyroidism, who lives at assisted living, presents to ER with concerns of abdominal pain.    Acute appendicitis with perforation -  Seen by general surgery  Given severe aortic stenosis, age, she is high risk for surgical intervention.  Recommend conservative management.  IV antibiotics  Pain control  Gentle IVF  Clear liquid diet.     A-fib -  Continue with lopressor  Not on anticoagulation at home  Monitor rate     Chronic CHF -  Cautious with IVF  Last echo 01/03/2023 with EF of 60-65%, severe aortic stenosis.     HTN -  Monitor BP    Disposition : TBD    Review of systems  General: No fevers or chills.  Cardiovascular: No chest pain or pressure. No palpitations.   Pulmonary: No shortness of breath.   Gastrointestinal: see above.     Physical Exam:  General: Awake, cooperative, no acute distress    HEENT: NC, Atraumatic.  PERRLA, anicteric sclerae.  Lungs: CTA Bilaterally. No Wheezing/Rhonchi/Rales.  Heart:  S1 S2,  + murmur, No Rubs, No Gallops  Abdomen: Soft, Non distended, RLQ tender.  +Bowel sounds,   Extremities: No c/c/e  Psych:   Not anxious or agitated.  Neurologic:  No acute neurological deficit.         Vital signs/Intake and Output:  Visit Vitals  BP (!) 139/54   Pulse 78   Temp 98.6 °F (37 °C) (Oral)   Resp 16   Ht 1.549 m (5' 1\")   Wt 75.1 kg (165 lb 8 oz) 
  Hospitalist Progress Note    Patient: Alesha Montemayor MRN: [id number]  CSN: [id number]    YOB: 1927  Age: 89 y.o.  Sex: female    DOA: 2/20/2024 LOS:  LOS: 4 days            Assessment/Plan     Active Hospital Problems    Diagnosis     Chronic congestive heart failure (HCC) [I50.9]      Priority: Medium    Chronic atrial fibrillation (HCC) [I48.20]      Priority: Medium    Acute appendicitis with perforation and localized peritonitis [K35.32]     Aortic stenosis [I35.0]     HTN (hypertension) [I10]     Hypothyroidism [E03.9]       Abdominal pain   89 y.o. female with HTN, A-fib, severe aortic stenosis, hypothyroidism, who lives at assisted living, presents to ER with concerns of abdominal pain.     Acute appendicitis with perforation: Clinically improving. seen by general surgery, discussed the case with Dr. Dillon: If improvement stops or worsens would repeat CT scan on Sunday.  Given severe aortic stenosis, age, she is high risk for surgical intervention.  Recommend conservative management.  IV antibiotics  Pain control  Gentle IVF  Clear liquid diet.    Cough with no fevers and chills: Checks x-ray done with negative finding.  Symptomatic care     A-fib : Continue with lopressor  Not on anticoagulation at home  Monitor rate     Chronic CHF: Cautious with IVF  Last echo 01/03/2023 with EF of 60-65%, severe aortic stenosis.     HTN : Monitor BP    PT OT    DVT GI prophylaxis    DNR    Dispo: TBD      TIME: E/M Time spent with patient and patient care issues: [] 31-40 mins  [x] 41-49 mins  [] 50 mins or more.     CC:   Abdominal pain       Subjective:     Pt was seen and examined with the nurse in the morning round.     \" I think I am doing better.  Could you please sit down and talk to me? \"     \"I was coughing little bit, but no trouble with breathing.\"      Review of systems  General: No fevers or chills.  Cardiovascular: No chest pain or pressure. No palpitations.   Pulmonary: No cough, 
  Hospitalist Progress Note    Patient: Alesha Montemayor MRN: [id number]  CSN: [id number]    YOB: 1927  Age: 89 y.o.  Sex: female    DOA: 2/20/2024 LOS:  LOS: 2 days                Assessment/Plan     Active Hospital Problems    Diagnosis     Chronic congestive heart failure (HCC) [I50.9]      Priority: Medium    Chronic atrial fibrillation (HCC) [I48.20]      Priority: Medium    Acute appendicitis with perforation and localized peritonitis [K35.32]     Aortic stenosis [I35.0]     HTN (hypertension) [I10]     Hypothyroidism [E03.9]         Chief complaint :  Abdominal pain   89 y.o. female with HTN, A-fib, severe aortic stenosis, hypothyroidism, who lives at assisted living, presents to ER with concerns of abdominal pain.    Acute appendicitis with perforation -  Seen by general surgery  Given severe aortic stenosis, age, she is high risk for surgical intervention.  Recommend conservative management.  IV antibiotics  Pain control  Gentle IVF  Clear liquid diet.     A-fib -  Continue with lopressor  Not on anticoagulation at home  Monitor rate     Chronic CHF -  Cautious with IVF  Last echo 01/03/2023 with EF of 60-65%, severe aortic stenosis.     HTN -  Monitor BP    Disposition : TBD    Review of systems  General: No fevers or chills.  Cardiovascular: No chest pain or pressure. No palpitations.   Pulmonary: No shortness of breath.   Gastrointestinal: see above.     Physical Exam:  General: Awake, cooperative, no acute distress    HEENT: NC, Atraumatic.  PERRLA, anicteric sclerae.  Lungs: CTA Bilaterally. No Wheezing/Rhonchi/Rales.  Heart:  S1 S2,  + murmur, No Rubs, No Gallops  Abdomen: Soft, Non distended, RLQ tender.  +Bowel sounds,   Extremities: No c/c/e  Psych:   Not anxious or agitated.  Neurologic:  No acute neurological deficit.         Vital signs/Intake and Output:  Visit Vitals  BP (!) 141/71   Pulse 92   Temp 98.1 °F (36.7 °C) (Oral)   Resp 17   Ht 1.549 m (5' 1\")   Wt 75.1 kg (165 lb 8 
  Hospitalist Progress Note    Patient: Alesha Montemayor MRN: [id number]  CSN: [id number]    YOB: 1927  Age: 89 y.o.  Sex: female    DOA: 2/20/2024 LOS:  LOS: 7 days                Assessment/Plan     Active Hospital Problems    Diagnosis     Chronic congestive heart failure (HCC) [I50.9]      Priority: Medium    Chronic atrial fibrillation (HCC) [I48.20]      Priority: Medium    Acute appendicitis with perforation and localized peritonitis [K35.32]     Aortic stenosis [I35.0]     HTN (hypertension) [I10]     Hypothyroidism [E03.9]         Chief complaint :  Abdominal pain   89 y.o. female with HTN, A-fib, severe aortic stenosis, hypothyroidism, who lives at assisted living, presents to ER with concerns of abdominal pain.    Acute appendicitis with perforation -  Seen by general surgery  Given severe aortic stenosis, age, she is high risk for surgical intervention.  Recommend conservative management.  IV antibiotics  Pain control  Gentle IVF  On regular diet.  Follow up CT reviewed, fluid not amenable to IR drainage.     A-fib -  Continue with lopressor  Not on anticoagulation at home  Monitor rate     Chronic CHF -  Cautious with IVF  Last echo 01/03/2023 with EF of 60-65%, severe aortic stenosis.     HTN -  Monitor BP        Disposition : 1-2 days    Review of systems  General: No fevers or chills.  Cardiovascular: No chest pain or pressure. No palpitations.   Pulmonary: No shortness of breath.   Gastrointestinal: see above.     Physical Exam:  General: Awake, cooperative, no acute distress    HEENT: NC, Atraumatic.  PERRLA, anicteric sclerae.  Lungs: CTA Bilaterally. No Wheezing/Rhonchi/Rales.  Heart:  S1 S2,  + murmur, No Rubs, No Gallops  Abdomen: Soft, Non distended, RLQ tender.  +Bowel sounds,   Extremities: No c/c/e  Psych:   Not anxious or agitated.  Neurologic:  No acute neurological deficit.         Vital signs/Intake and Output:  Visit Vitals  /67   Pulse 85   Temp 98.1 °F (36.7 °C) 
  Physical Therapy Goals:  Initiated 2/26/2024 to be met within 7-10 days.  Short Term Goals  Short Term Goal 1: Patient will perform supine to/from sit with independence  Short Term Goal 2: Patient will perform sit to/from stand with supervision in prep for gait progression  Short Term Goal 3: Patient will ambulate 150 ft with LRAD and supervision to progress OOB mobility        PHYSICAL THERAPY TREATMENT    Patient: Alesha Montemayor (89 y.o. female)  Date: 2/26/2024  Diagnosis: Acute appendicitis [K35.80]  Acute appendicitis with perforation and localized peritonitis, without abscess or gangrene [K35.32] Acute appendicitis with perforation and localized peritonitis    Precautions: Fall Risk  PLOF: amb with rollator at [facility]    ASSESSMENT:  Pt found sitting up in chair at bedside on PT arrival. Reports she is feeling well and wi/o pain.  Requesting to use bathroom at start of session.  Requires min A sit to stand from chair (possibly d/u to extended time sitting).  Ambulates with bathroom with CGA/RW; stand to sit on commode with CGA.  Pt sit to stand from commode with min/CGA; able to perform self hygiene care in stance and hand hygiene care with supervision/SBA. Pt  participates with GT 350ft CG/SBA.   Verbal cues for [facility] mgmt when turning as pt tends to  walker (uses rollator usually).  Returned to room, and back to bed with supervision; encouraged to bring LE's onto bed w/o PT assist, and pt able to perform same.  Pt left in NAD, all needs in reach, bed alarm engaged and nurse Rosetta notified.of session outcome.   Will benefit from return to [facility] and HHPT at discharge.     Assessment  Activity Tolerance: Patient tolerated treatment well    Progression toward goals:   [x]      Improving appropriately and progressing toward goals  []      Improving slowly and progressing toward goals  []      Not making progress toward goals and plan of care will be adjusted     PLAN:  Patient continues to benefit from 
  Physical Therapy Goals:  Initiated 2/27/2024 to be met within 7-10 days.  Short Term Goals  Short Term Goal 1: Patient will perform supine to/from sit with independence  Short Term Goal 2: Patient will perform sit to/from stand with supervision in prep for gait progression  Short Term Goal 3: Patient will ambulate 150 ft with LRAD and supervision to progress OOB mobility    []  Patient has met MD deana neri for d/c home   []  Recommend  with 24 hour adult care   []  Benefit from additional acute PT session to address:        PHYSICAL THERAPY TREATMENT    Patient: Alesha Montemayor (89 y.o. female)  Date: 2/27/2024  Diagnosis: Acute appendicitis [K35.80]  Acute appendicitis with perforation and localized peritonitis, without abscess or gangrene [K35.32] Acute appendicitis with perforation and localized peritonitis      Precautions: Fall Risk,  ,  ,  ,  ,  ,  ,    PLOF: lives in [facility], ambulatory with RW or rollator    ASSESSMENT:  Assessment  Assessment: Pt sitting in chair upon arrival.  No difficulty performing sit to stand.  Ambulated with RW, steady reciprocal gt pattern, no LOB or path deviations.  Returned to room and back to sitting in chair.  Activity Tolerance: Patient tolerated treatment well  Discharge Recommendations: Home with Home health PT;No therapy recommended at discharge    Progression toward goals:   [x]      Improving appropriately and progressing toward goals  []      Improving slowly and progressing toward goals  []      Not making progress toward goals and plan of care will be adjusted     PLAN:  Patient continues to benefit from skilled intervention to address the above impairments.  Continue treatment per established plan of care.    Further Equipment Recommendations for Discharge:   Discharge Recommendation: Discharge Recommendations: Home with Home health PT;No therapy recommended at discharge    [facility]: 15/20    This [facility] score should be considered in conjunction with 
  Physician Progress Note      PATIENT:               BETO DURON  CSN #:                  [id number]  :                       3/22/1927  ADMIT DATE:       2024 12:24 PM  DISCH DATE:  RESPONDING  PROVIDER #:        Jose Rafael Grey MD          QUERY TEXT:    Pt admitted with acute appendicitis with perforation. Pt noted to have WBC   15.3, , Axillary Temp 100.3. If possible, please document in the   progress notes and discharge summary if you are evaluating and /or treating   any of the following:    The medical record reflects the following:  Risk Factors: Acute appendicitis with perforation and localized peritonitis    Clinical Indicators:  24 13:07 WBC: 15.3 (H)  24 02:45 WBC: 14.4 (H)   ,  Axillary Temp 100.3    Treatment: receiving IV NS, Flagyl    Afsaneh Daniel, RN, BSN, CRCR  [address] [facility] [place] [address]  [email or web address]  Options provided:  -- Sepsis, present on admission  -- Sepsis, present on admission, now resolved  -- Acute appendicitis without Sepsis  -- Other - I will add my own diagnosis  -- Disagree - Not applicable / Not valid  -- Disagree - Clinically unable to determine / Unknown  -- Refer to Clinical Documentation Reviewer    PROVIDER RESPONSE TEXT:    This patient has acute appendicitis without Sepsis.    Query created by: Afsaneh Daniel on 2024 2:42 PM      Electronically signed by:  Jose Rafael Grey MD 2024 7:21 AM          
 conducted an initial consultation and spiritual assessment for Alesha Montemayor, who is a 89 y.o.,female.     A very pleasant lady, God fearing and has great trust in her Doctors.    Initiated a relationship of care and support.   Explored issues of grace, belief, spirituality and Shinto/ritual needs.  Listened empathically.  Provided information about Spiritual Care Services.   confirmed Patient's Alevism Affiliation.  Patient processed feelings about current hospitalization.  Offered prayer and assurance of continued prayers on patients behalf.   Chart reviewed.  Patient expressed gratitude for Spiritual Care visit.    Chaplains will continue to follow and will provide pastoral care as needed or requested.    recommends bedside caregivers page  on duty if patient shows signs of acute spiritual or emotional distress.    Sister Nataly Wayne MA, [facility]     Spiritual Care  [phone]   
0100  This RN notified of O2 sat 89%. Patient denies SOB or difficulty breathing. Placed on 2L NC, O2 now 96%. Patient placed on [place] and brief. Administered prn medications per MAR. Patient resting quietly, no s/s of acute distress. Breathing even and unlabored. Bed alarm on, bed wheels locked, call bell within reach, and door open. Pt instructed to call if SOB.     0709  Bedside and Verbal shift change report given to FAVIAN Gomez (oncoming nurse) by FAVIAN Denis (offgoing nurse). Report included the following information Nurse Handoff Report, Adult Overview, Intake/Output, MAR, Recent Results, and Event Log.                
1015 IR came up to talk to pt about not doing drainage of fluid - not enough to drain.  IR in talking to pt and daughter at this time  
1038 Page MD Adamson per patient / daughter request. Stated feel concerned that CT scan is not being repeated. Patient is agitated and stated pain was radiating from lower right quadrant to upper right of abdomen.           0978 Patient request to speak with a   1124 Case Management Magdalena stated addressed financial concerns with patient.   
1300: Plan to discharge back to [facility], daughter to transport.   RN attempted to call report x2 no answer, left message on voicemail.     1315: Daughter concerned about swelling in her moms legs. MD Grey paged and ordered to give Lasix 40mg  PO X1 now and elevate legs.     Patient refused and will take her lasix when she gets home.   
1442 Patient completed 1st cup of the contrast.     30min rest to start second cup at 1512    1527 Patient finished the 2nd contrast cup.     Page MD Dillon to confirm delayed contrast. Stated will get CT scan an hour and a half after 2nd cup. Called CT and notified the time    1650 is the plan to move patient to CT  1747 Called Anaya to inform patient is back on unit per patient request.   
2015-Bedside report received from FAVIAN Carson. Pt A & O X 4. Pain at 2. Pt without any issues. Call light within reach.  2030-Pt on a chair at this time. IV site to L AC  patent and intact. Pt A & O x 4. LS clear, on RA.  + CSM. Pain at 2. + BS to all 4 quadrants. Pt denies nausea. Call light within raech. Pt denies any needs at this time.  Pt had an uneventful shift. .Pt ambulated with assistance with a walker. Pain remained well-controlled with the ordered medication. No issues/concerns at this time. Call bell within reach    
24 This writer received Handoff in regards to:  Alesha Montemayor (89 y.o., female)    : 3/22/1927    Admitted: 2024     Report on Alesha Montemayor ,(89 y.o., female), was received from Offgoing nurse, FAVIAN Lucero    All questions and concerns of this writer, Patient, and/or Family were addressed at this time. Report on patient's status and plan of care was given.      ___________________________________________________________________________________________________________________________________________________________________________________________________                                                                              Shift Event Summary:     9083-4404: At time of handoff, Patient is currently sitting up in chair finishing meal. Tray taken after report. Patient has bilateral hearing aids in and purewick. No complaints of pain at this time. Patient wants to sit up until 9pm and would like tylenol.    :     7778-1852: See MAR. Patient set up with walker and independently transferred to bed. Patient placed hearing aids in her case. Purewic connection assessed.     1103-7270: Patient concerned of IV leaking. This writer attempted x2. See Avatar. No concerns at this time. Purewic connection assessed.     5618-3817: Patient resting.     9486-7215 No concerns voiced. Suction working.  On purewic.     9012-1692    1297-0021 Patient declined purewic, not output noted. Patient got up to bathroom with walker x1 assist. Has hiccups and stated that her pain was worse and had nausea but relieved with passing gas.     6208-6329 Patient had 2 small-medium black loose stools. Patient wants to sit up at this time    2746-3865: At time of handoff, Patient is    Throughout the shift, the patient has been 
4782 MD oliver  Stated WBC is better and has been reevaluating with patient everyday was looking for the appropriate time for CT scan. Patient will have oral IV contrast. Delayed study preferred  to prevent from going to sm bowels. New order being placed.  
Assisting primary RN with care.  Communicated instructions with patient, verbalized understanding.CT with oral contrast ordered, initiated first dose of oral contrast @ 1430.    @ 1512 second cup of oral contrast being administered for CT.  
Bedside and Verbal shift change report given to FAVIAN Hall (oncoming nurse) by Rosa Elena Calderon RN (offgoing nurse). Report included the following information Intake/Output, MAR, Recent Results, and Cardiac Rhythm SR .     
Bedside and Verbal shift change report given to FAVIAN Zimmer (oncoming nurse) by FAVIAN Lucero (offgoing nurse). Report included the following information Nurse Handoff Report, Adult Overview, Intake/Output, and MAR.    
Bedside and Verbal shift change report given to FAVIAN Zimmer (oncoming nurse) by FAVIAN Lucreo (offgoing nurse). Report included the following information Nurse Handoff Report, Adult Overview, Intake/Output, and MAR.    
Bedside and Verbal shift change report given to Keyon (oncoming nurse) by rani (offgoing nurse). Report included the following information Nurse Handoff Report, Adult Overview, MAR, and Cardiac Rhythm   .    
Bedside and Verbal shift change report given to Rosa Elena Calderon RN (oncoming nurse) by FAVIAN Samuel (offgoing nurse). Report included the following information Nurse Handoff Report, Intake/Output, MAR, Recent Results, and Cardiac Rhythm SR .     
Bedside and Verbal shift change report received from FAVIAN Gomez.     Assumed care of patient as of this time. Assessment completed as per flow sheet. Patient NAD, denies SOB, chest pain, and other subjective complaints at present. Safety measures in place, bed alarm on, over bed table and call bell within reach.      0414    Assisted to the BR with standard walker. Patient noted to have another loose BM.     0645    Ambulated to the bathroom with a walker x 1 assist c/o FAVIAN Del Cid.     7279    Bedside and Verbal shift change report given to FAVIAN Carson (oncoming nurse) by FAVIAN Madison (offgoing nurse). Report included the following information Nurse Handoff Report, Adult Overview, Intake/Output, MAR, and Recent Results.                
Bedside shift change report given to Gina RN (oncoming nurse) by Sabrina Hodgson RN   (offgoing nurse). Report included the following information Nurse Handoff Report, Index, Intake/Output, MAR, and Recent Results.     
[facility] on [facility] called CM to clarify procedure for patient returning when ready to discharge:  1) Per WAYNE lay, no on site assessment is required for patient to return  2) Any new medications will require hard prescriptions sent to facility  3) Facility requests copy of therapy orders and D/C summary be faxed to: [phone]  4) Nursing report: call report to [phone]  
CM reported to this CM that the last time she was in the hospital it was \"very hard to get me back\" to her AL facility. Wesly called [facility] on the [place] on Wed 2/21 and left a vm asking for the DON to call this CM to verify their policies and procedures for patients returning to AL at discharge. CM did not receive a call back on Wednesday.     CMS called Thursday morning and spoke with personnel at the AL facility. CMS left contact information for this CM and requested call back from WAYNE Hawk. CM awaiting call back.       
Care  assisting Care Manager Andreina Finch RN with Discharge Planning needs for patient.  Updates sent to [facility] [place] where patient resides.  Left message with facility for JIM Spencer to contact us regarding patient's return once stable.  This writer left my contact number [phone] for call back.    Will follow along with Care Mgr for further discharge plans.  
Care  assisting Care Manager, Andreina Finch RN with Discharge Planning needs for patient.  Updates sent to [facility].  Notes sent for review.  This writer contacted [facility] and was informed by Casper to fax notes to [phone] to the attention of Johanna FERNANDEZ for review and acceptance back to their facility.    This writer gave Casper at [facility] Charles Diaz Mgr's phone # [phone] and ask Casper to have Johanna (JIM) contact us regarding patient's return protocol.    Will continue to follow along for further discharge planning needs.  
Care  assisting Care Mgr Andreina Finch RN with Discharge Planning needs for patient.  Updated notes sent to [facility] for review.  Patient resides at [facility] and will return there today upon discharge.      Will continue to follow for further needs.  
Care  assisting with Care Manager Andreina Finch RN with Discharge Planning needs for patient.  Updates sent to [facility] [place].      Will continue to follow along for further discharge planning needs.  
Care Mgmt visited patient in room, patient's daughter, Anaya Olivas KEY present during visit.  2nd IMM reviewed/discussed with patient and patient's daughter, who acknowledged understanding of letter and right to appeal.  Patient's daughter, Anaya Olivas KEY given a copy of letter, original letter placed in patient's chart.  
Case Management Assessment  Initial Evaluation    Date/Time of Evaluation: 2/21/2024 2:06 PM  Assessment Completed by: Andreina Finch RN    If patient is discharged prior to next notation, then this note serves as note for discharge by case management.    Patient Name: Alesha Montemayor                   YOB: 1927  Diagnosis: Acute appendicitis [K35.80]  Acute appendicitis with perforation and localized peritonitis, without abscess or gangrene [K35.32]                   Date / Time: 2/20/2024 12:24 PM    Patient Admission Status: Inpatient   Readmission Risk (Low < 19, Mod (19-27), High > 27): Readmission Risk Score: 12.3    Current PCP: Khushi Galan, DO  PCP verified by CM? Yes    Chart Reviewed: Yes      History Provided by: Patient  Patient Orientation: Alert and Oriented    Patient Cognition: Alert    Hospitalization in the last 30 days (Readmission):  No    If yes, Readmission Assessment in CM Navigator will be completed.    Advance Directives:      Code Status: DNR   Patient's Primary Decision Maker is: Legal Next of Kin    Primary Decision Maker: Anaya Cuadra - Child - [phone]    Discharge Planning:    Patient lives with: Other (Comment) Type of Home: Assisted living  Primary Care Giver: Self  Patient Support Systems include: Family Members, Other (Comment)   Current Financial resources: Medicare  Current community resources: Assisted Living  Current services prior to admission: Durable Medical Equipment            Current DME: Walker, Other (Comment)            Type of Home Care services:  None    ADLS  Prior functional level: Independent in ADLs/IADLs  Current functional level: Independent in ADLs/IADLs    PT AM-PAC:   /24  OT AM-PAC:   /24    Family can provide assistance at DC: Other (comment)  Would you like Case Management to discuss the discharge plan with any other family members/significant others, and if so, who?    Plans to Return to Present Housing: Yes  Other 
D/C Plan: Discharge back to [facility] with daughter to transport    Nursing please call report to : [phone]    CM verified with he WAYNE Spencer that patient is able to return today to [facility]. CM verified the phone number for nurse to nurse report. CM rounded with provider, he has spoken with the patient's daughter who requested medications to be filled at our pharmacy (as daughter reports she administers all her medications) and reported to the physician she will transport patient back to [facility] at discharge.       
No surgery planned due to age, on IV antibiotics with plan for oral antibiotics on discharge. Up with assist and a walker to bedside commode, incontinent brief on. Taking Tylenol for prevention of shoulder pain    0750 Bedside and Verbal shift change report given to Lizzie Deng RN (oncoming nurse) by SHERINE ELLISON RN   (offgoing nurse). Report included the following information Nurse Handoff Report, Adult Overview, Intake/Output, MAR, Recent Results, Cardiac Rhythm AFib, and Quality Measures.     
Occupational Therapy Goals:  Initiated 2/22/2024 to be met within 7-10 days.  Short Term Goals  Time Frame for Short Term Goals: 7 days  Short Term Goal 1: Patient will complete toilet transfer with mod I  Short Term Goal 2: Patient will complete bathing with mod I  Short Term Goal 3: Patient will complete grooming in standing at sink with mod I  Short Term Goal 4: Patient will complete dressing with mod I  Short Term Goal 5: Patient will complete toileting with mod I    OCCUPATIONAL THERAPY EVALUATION    Patient: Alesha Montemayor (89 y.o. female)  Date: 2/22/2024  Primary Diagnosis: Acute appendicitis [K35.80]  Acute appendicitis with perforation and localized peritonitis, without abscess or gangrene [K35.32]       Precautions: Fall Risk,  ,  ,  ,  ,  ,  ,    PLOF: Patient reports \" I make my own bed, I shower by myself, I get dressed by myself, and I walk myself to the dining room\" patient uses rollator/RW for ambulation    ASSESSMENT :  Patient presented supine in bed with gripper socks, O2 via NC at 1L, Purwick, brief, cardiac monitor, and IV line. Patient with visitor (granddaughter) present for entire session. Patient completed assessment of ADLs and BUE strength, ROM (see details below). Patient completed lower body dressing minimum assist to doff brief and don pullup brief, toileting minimum assist for bowel hygiene thoroughness, and toilet transfer contact guard assist. Patient used RW and GB for ambulation. Patient completed hand washing at sink with SBA. Patient reporting fatigue from ADLs. Patient min A to return to supine, able to scoot self up in flat bed. Patient left with lunch tray setup. Patient very agreeable and appreciative. Due to deficits (listed below) patient has decreased independence with ADLs and functional mobility and would benefit from home health occupational therapy  ,      DEFICITS/IMPAIRMENTS:  Performance deficits / Impairments: Decreased functional mobility ;Decreased 
PHYSICAL THERAPY TREATMENT    Patient: Alesha Montemayor (89 y.o. female)  Date: 2/23/2024  Diagnosis: Acute appendicitis [K35.80]  Acute appendicitis with perforation and localized peritonitis, without abscess or gangrene [K35.32] Acute appendicitis with perforation and localized peritonitis      Precautions: Fall Risk,  ,  ,  ,  ,  ,  ,      ASSESSMENT:  Daughter as bedside requesting External cath. Informed her of pt's returning mobility. Nurse manager entered to discuss further. Pt demonstrate highest level of mobility observed during this hospitalization. Assisted required for sit to supine, but other wise needing no physical assist. Pt voices concern about medical plan and uncertainty about her near future.  offered. 100% SPO2, on RA after long distance ambulation (mod SOB). Will maximize output before D/c. LOPEZ-ILF recommended.      Progression toward goals: Fair  [x]      Improving appropriately and progressing toward goals  []      Improving slowly and progressing toward goals  []      Not making progress toward goals and plan of care will be adjusted     PLAN:  Patient continues to benefit from skilled intervention to address the above impairments.  Continue treatment per established plan of care.    Further Equipment Recommendations for Discharge: gait belt and rolling walker    [facility]:   AM-PAC Inpatient Mobility without Stair Climbing Raw Score : 15    Current research shows that an AM-PAC score of 17 (13 without stairs) or less is not associated with a discharge to the patient's home setting. Based on an AM-PAC score and their current functional mobility deficits, it is recommended that the patient have 5-7 sessions per week of Physical Therapy at d/c to increase the patient's independence.  Currently, this patient demonstrates the potential endurance, and/or tolerance for 3 hours of therapy each day at d/c.    Current research shows that an AM-PAC score of 17 (13 without stairs) or less is not 
PHYSICAL THERAPY TREATMENT    Patient: Alesha Montemayor (89 y.o. female)  Date: 2/25/2024  Diagnosis: Acute appendicitis [K35.80]  Acute appendicitis with perforation and localized peritonitis, without abscess or gangrene [K35.32] Acute appendicitis with perforation and localized peritonitis      Precautions: Fall Risk,  ,  ,  ,  ,  ,  ,      ASSESSMENT:  Decrease in stability with transfer to standing. Pt notes feel off today. Still able to extend amb distance and demonstrate fair+ balance with ambulation. Will continue to assess until D/c. MCFP is recommended.     Progression toward goals: Fair   []      Improving appropriately and progressing toward goals  [x]      Improving slowly and progressing toward goals  []      Not making progress toward goals and plan of care will be adjusted     PLAN:  Patient continues to benefit from skilled intervention to address the above impairments.  Continue treatment per established plan of care.    Further Equipment Recommendations for Discharge: rolling walker    [facility]:   AM-PAC Inpatient Mobility without Stair Climbing Raw Score : 15    Current research shows that an AM-PAC score of 17 (13 without stairs) or less is not associated with a discharge to the patient's home setting. Based on an AM-PAC score and their current functional mobility deficits, it is recommended that the patient have 5-7 sessions per week of Physical Therapy at d/c to increase the patient's independence.  Currently, this patient demonstrates the potential endurance, and/or tolerance for 3 hours of therapy each day at d/c.    Current research shows that an AM-PAC score of 17 (13 without stairs) or less is not associated with a discharge to the patient's home setting. Based on an AM-PAC score and their current functional mobility deficits, it is recommended that the patient have 3-5 sessions per week of Physical Therapy at d/c to increase the patient's independence.     Current research shows that an 
Palliative Medicine   [facility] [phone]  [facility] [phone]    CODE STATUS:  DNR / DNI.  Newly completed POST.    AMD:  Pt's children, Anaya Alberto and Marc Montemayor are legal next of kin and jointly fulfill the role of surrogate medical decsision jeremy, per the [facility] Hierarchy of Medical Decision Makers.     Palliative team, Britt Estevez NP and this LMSW met with pt at bedside for follow up visit. Upon entry, pt laying in bed with head elevated, pt's granddaughter, Julia, at bedside.  Pt reports no pain or shortness of breath. Pt had eaten her clear liquids lunch.  Pt engaged in conversation appropriately.  Reports she's about to be a great great grandmother.  She verbalized great excitement about this.  Neither she nor her granddaughter verbalized any questions or concerns at this time.     Thank you for this referral to Palliative care. Goals of care and surrogate decision makers have been addressed. All palliative care needs have been addressed at this time. Please reconsult if additional palliative needs arise.     Tianna Rico, GHASSANSW, APHSW-C  Palliative Medicine Inpatient   [facility]  [phone]  [facility]   Palliative COPE Line: [phone] ([phone])  
Palliative Medicine Consult  [facility]: [phone] ([phone])  [facility]: [phone]     Patient Name: Alesha Montemayor  YOB: 1927    Date of Initial Consult: 2/21/2024  Date of follow up: 2/22/2024  Reason for Consult: goals of care discussion/ACP/symptoms management  Requesting Provider:  Jose Rafael Grey MD  Primary Care Physician: Khushi Galan DO      SUMMARY:     Alesha Montemayor is a 89 y.o. with a past history of hypertension, atrial fibrillation, severe aortic stenosis and hypothyroidism, who was admitted on 2/20/2024 from [facility] with a diagnosis of acute appendicitis with perforation.  Patient initially presented to emergency room with complaint of abdominal pain for last 3 days.  In the emergency room, CT scan showed patient having appendicitis with partial contained rupture without any abscess.  Patient was seen by general surgeon who recommended conservative management as patient is high risk for any surgical intervention.  Patient has been on Lopressor for A-fib.  Current medical issues leading to Palliative Medicine involvement include:.  To establish goals of care.    2/22/2024:  Patient is awake, alert, oriented x 4, no complaints, abdominal pain has resolved.    2/21/2024: Patient continues to have some abdominal pain without any nausea or vomiting.  No shortness of breath or cough.  She has chronic right shoulder pain secondary to arthritis.  No headaches or dizziness.     HISTORY:     History obtained from: Patient    CHIEF COMPLAINT: Abdominal pain    HPI/SUBJECTIVE:    The patient is:   [x] Verbal and participatory  [] Non-participatory due to:         PHYSICAL EXAM:     From RN flowsheet:  Wt Readings from Last 3 Encounters:   02/21/24 75.1 kg (165 lb 8 oz)   03/20/23 73.4 kg (161 lb 13.1 oz)       BP (!) 123/58   Pulse 79   Temp 97.6 °F (36.4 °C) (Oral)   Resp 16   Ht 1.549 m (5' 1\")   Wt 75.1 kg (165 
Patient is afebrile vital signs are stable.  Abdomen is slightly tender in the right lower quadrant no rebound.  White blood cell count has come down slightly.    Plan right now is to continue conservative treatment with IV antibiotics and hopefully avoid an operation.  She may need repeat CT scan at some point and if she develops an abscess will need IR drainage.  
Patient stable. In good spirits and sitting in a chair with her daughter. State she feels better today. Reviewed CT scan results. Plan on seeing if I are will be able to drain anything. Subsequent phone call from. IR stated that there is no true collection for them to drain.continue treatment as current. 
Per IR provider recommendations in consult, will delete the IR guided drainage orders for patient.   
Per conversation with GI surgeon on 2/27, patient is anticipated to discharge within the next 24-48 hours. CM called [facility] on the [place], left vm. As per previous conversation with the WAYNE Flores, the facility needs the following for discharge    [facility] [place] called CM to clarify procedure for patient returning when ready to discharge:  1) Per WAYNE Flores, no on site assessment is required for patient to return  2) Any new medications will require hard prescriptions sent to facility  3) Facility requests copy of therapy orders and D/C summary be faxed to: [phone]  4) Nursing report: call report to [phone]  
Physical Therapy    1133: Pt eating lunch, will follow up.    
Physical Therapy Goals:  Initiated 2/22/2024 to be met within 7-10 days.  Short Term Goals  Short Term Goal 1: Patient will perform supine to/from sit with independence  Short Term Goal 2: Patient will perform sit to/from stand with supervision in prep for gait progression  Short Term Goal 3: Patient will ambulate 150 ft with LRAD and supervision to progress OOB mobility  PHYSICAL THERAPY EVALUATION    Patient: Alesha Montemayor (89 y.o. female)  Date: 2/22/2024  Diagnosis: Acute appendicitis [K35.80]  Acute appendicitis with perforation and localized peritonitis, without abscess or gangrene [K35.32] Acute appendicitis with perforation and localized peritonitis  Precautions: Fall Risk  PLOF: independent ambulation with RW, lives in [facility] apartment. Ambulates to dining room for meals. She reports she takes shower once a week independently.    ASSESSMENT :  Patient received supine in bed. Pt presents with decreased LE strength, decreased gait quailty, decreased activity tolerance, and decreased bed mobility and transfers . Patient performed supine to sit with contact guard assist. Patient perform sit to/from stand with contact guard assist. Patient ambulated 40 ft with rolling walker and contact guard assist. Patient sat up in chair for ~30 minutes during session performing LE therex, however despite encouragement pt declined continued sitting in chair and assisted with return to supine for pt to perform her \"afternoon nap.\" Highly recommend to discontinue use of purewick as pt is able to ambulate with 1 person assist to/from bathroom. Patient appears below baseline level and would benefit from continued skilled PT to progress functional mobility. Recommend return to [facility] with home health PT at discharge.    DEFICITS/IMPAIRMENTS:    Body Structures, Functions, Activity Limitations Requiring Skilled Therapeutic Intervention: Decreased functional mobility ;Decreased ROM;Decreased strength;Decreased endurance;Decreased 
Pt progressing, wbc count improving but continued pain. Will get CT scan to see if collection formed and then may be drainable.  
Pt seen and evaluated with daughter (71yo) and grandson in law.    Perforated appendicitis - currently stable.  Pt with recent acute CHF hospitalization and new onset afib with EKG abnormalities. Echo showed numerous abnormalities but severe aortic stenosis makes patient an extremely high risk for surgery with high risk of intraoperative death.  Recommend IV antibiotics, clears, IV narcotics and cardiology input on surgery.  Medical mgmt with IR drainage if collection forms.  Pt and family know how serious this is and pt may die of sepsis without surgery or of sudden cardiac collapse with surgery.  They agree with not starting with surgery and seeing the antibiotic effect.  
Reviewed imaging - but no report yet    Recommend IR consult for possible drainage.  
The patient is afebrile her vital signs are stable.  White blood cell count is down.  Abdomen is mildly tender in the lower abdomen in the right lower quadrant with no rebound.  Will slowly advance diet.  Patient needs to be out of bed.  Will continue IV antibiotics for now  
This writer received Handoff in regards to:  Alesha Montemayor (89 y.o., female)    : 3/22/1927    Admitted: 2024     Report on Alesha Montemayor ,(89 y.o., female), was received from Offgoing nurse, FAVIAN Lucero    All questions and concerns of this writer, Patient, and/or Family were addressed at this time. Report on patient's status and plan of care was given.      ___________________________________________________________________________________________________________________________________________________________________________________________________                                                                              Shift Event Summary:     2708-8648: At time of handoff, Patient is currently being assisted by Allegra and Cathleen back to bed and repositioned      0187-7689:Patient complains of pain at this time. Patient is okay with tylenol. She currently has her hearing aids out at this time. No additional concerns. Bed lowered patient is in no apparent distress.     [id number] Otherwise uneventful evening. Patient ambulated to the bathroom x3 has had bowel movements overnight    2038-2361: At time of handoff, Patient is    Throughout the shift, the patient has been   __________________________________________________________________________________________________________________________________________________________________________________________________    This writer gave Handoff in regards to:  Alesha Montemayor (89 y.o., female)    : 3/22/1927    Admitted: 2024     This Patient will be received by the oncoming Nurse;     All questions and concerns of the Nurse, Patient, and/or Family were addressed at this time. Report on patient's status and plan of care was given.   
Therapies:    Average Meal Intake: 51-75%  Average Supplements Intake: %  ADULT DIET; Regular  ADULT ORAL NUTRITION SUPPLEMENT; Breakfast, Lunch, Dinner; Standard High Calorie/High Protein Oral Supplement     Anthropometric Measures:  Height: 154.9 cm (5' 1\")  Ideal Body Weight (IBW): 105 lbs (48 kg)    Admission Body Weight: 80 kg (176 lb 5.9 oz)  Current Body Weight: 80 kg (176 lb 5.9 oz), 168 % IBW. Weight Source: Not Specified  Current BMI (kg/m2): 33.3  Usual Body Weight: 68 kg (150 lb) (noted on 1/11/2023)  % Weight Change (Calculated): 17.6  Weight Adjustment For: No Adjustment   BMI Categories: Obese Class 1 (BMI 30.0-34.9)    Estimated Daily Nutrient Needs:  Energy Requirements Based On: Formula     Energy (kcal/day): 1410 (Olean 1.25AF, 1SF)  Weight Used for Protein Requirements: Ideal  Protein (g/day): 58-72 (1.2-1.5 g/kg)  Method Used for Fluid Requirements: ml/Kg  Fluid (ml/day): 2000 mL (25ml/kg)    Nutrition Diagnosis:   Impaired nutrient utilization related to altered GI structure as evidenced by poor intake prior to admission, GI abnormality (onset of acute appendicitis w/ perforation and localized peritonitis)    Nutrition Interventions:   Food and/or Nutrient Delivery: Continue Current Diet, Continue Oral Nutrition Supplement  Nutrition Education/Counseling: No recommendation at this time  Coordination of Nutrition Care: Continue to monitor while inpatient       Goals:     Goals: Meet at least 75% of estimated needs, PO intake 75% or greater, prior to discharge       Nutrition Monitoring and Evaluation:      Food/Nutrient Intake Outcomes: Food and Nutrient Intake, Supplement Intake  Physical Signs/Symptoms Outcomes: Biochemical Data, GI Status    Discharge Planning:    No discharge needs at this time     Marcy Quiroz RD  Contact: [phone]      
Plan      Medications Reviewed  Sindhu Adamson MD  
separately obtained history  Performing a medically necessary appropriate examination and/or evaluation  Counseling and educating the patient/family/caregiver  Ordering medications, tests, or procedures  Referring and communicating with other health care professionals as needed  Documenting clinical information in the electronic or other health record  Independently interpreting results (not reported separately) and communicating results to the patient/family/caregiver  Care coordination and discharge planning with Case Management.

## 2024-04-11 ENCOUNTER — APPOINTMENT (OUTPATIENT)
Facility: HOSPITAL | Age: 89
End: 2024-04-11
Payer: MEDICARE

## 2024-04-11 ENCOUNTER — HOSPITAL ENCOUNTER (INPATIENT)
Facility: HOSPITAL | Age: 89
LOS: 1 days | Discharge: HOSPICE | End: 2024-04-12
Attending: INTERNAL MEDICINE | Admitting: INTERNAL MEDICINE
Payer: MEDICARE

## 2024-04-11 DIAGNOSIS — R41.0 DISORIENTATION: ICD-10-CM

## 2024-04-11 DIAGNOSIS — A41.9 SEPTICEMIA (HCC): Primary | ICD-10-CM

## 2024-04-11 DIAGNOSIS — N17.9 AKI (ACUTE KIDNEY INJURY) (HCC): ICD-10-CM

## 2024-04-11 PROBLEM — R10.9 ABDOMINAL PAIN: Status: ACTIVE | Noted: 2024-01-01

## 2024-04-11 LAB
ALBUMIN SERPL-MCNC: 2.7 G/DL (ref 3.4–5)
ALBUMIN/GLOB SERPL: 1 (ref 0.8–1.7)
ALP SERPL-CCNC: 90 U/L (ref 45–117)
ALT SERPL-CCNC: 11 U/L (ref 13–56)
ANION GAP SERPL CALC-SCNC: 9 MMOL/L (ref 3–18)
APPEARANCE UR: ABNORMAL
AST SERPL-CCNC: 21 U/L (ref 10–38)
BACTERIA URNS QL MICRO: ABNORMAL /HPF
BASOPHILS # BLD: 0 K/UL (ref 0–0.1)
BASOPHILS NFR BLD: 0 % (ref 0–2)
BILIRUB SERPL-MCNC: 0.6 MG/DL (ref 0.2–1)
BILIRUB UR QL: ABNORMAL
BUN SERPL-MCNC: 66 MG/DL (ref 7–18)
BUN/CREAT SERPL: 27 (ref 12–20)
CALCIUM SERPL-MCNC: 8.5 MG/DL (ref 8.5–10.1)
CHLORIDE SERPL-SCNC: 97 MMOL/L (ref 100–111)
CO2 SERPL-SCNC: 31 MMOL/L (ref 21–32)
COLOR UR: ABNORMAL
CREAT SERPL-MCNC: 2.44 MG/DL (ref 0.6–1.3)
DIFFERENTIAL METHOD BLD: ABNORMAL
EOSINOPHIL # BLD: 0 K/UL (ref 0–0.4)
EOSINOPHIL NFR BLD: 0 % (ref 0–5)
EPITH CASTS URNS QL MICRO: ABNORMAL /LPF (ref 0–5)
ERYTHROCYTE [DISTWIDTH] IN BLOOD BY AUTOMATED COUNT: 15.3 % (ref 11.6–14.5)
GLOBULIN SER CALC-MCNC: 2.7 G/DL (ref 2–4)
GLUCOSE SERPL-MCNC: 134 MG/DL (ref 74–99)
GLUCOSE UR STRIP.AUTO-MCNC: NEGATIVE MG/DL
HCT VFR BLD AUTO: 43.1 % (ref 35–45)
HGB BLD-MCNC: 14.2 G/DL (ref 12–16)
HGB UR QL STRIP: NEGATIVE
IMM GRANULOCYTES # BLD AUTO: 0 K/UL
IMM GRANULOCYTES NFR BLD AUTO: 0 %
KETONES UR QL STRIP.AUTO: ABNORMAL MG/DL
LACTATE SERPL-SCNC: 2.2 MMOL/L (ref 0.4–2)
LEUKOCYTE ESTERASE UR QL STRIP.AUTO: ABNORMAL
LYMPHOCYTES # BLD: 1.4 K/UL (ref 0.9–3.6)
LYMPHOCYTES NFR BLD: 9 % (ref 21–52)
MCH RBC QN AUTO: 33.1 PG (ref 24–34)
MCHC RBC AUTO-ENTMCNC: 32.9 G/DL (ref 31–37)
MCV RBC AUTO: 100.5 FL (ref 78–100)
METAMYELOCYTES NFR BLD MANUAL: 1 %
MONOCYTES # BLD: 1.1 K/UL (ref 0.05–1.2)
MONOCYTES NFR BLD: 7 % (ref 3–10)
NEUTS BAND NFR BLD MANUAL: 8 % (ref 0–5)
NEUTS SEG # BLD: 12.7 K/UL (ref 1.8–8)
NEUTS SEG NFR BLD: 75 % (ref 40–73)
NITRITE UR QL STRIP.AUTO: NEGATIVE
NRBC # BLD: 0 K/UL (ref 0–0.01)
NRBC BLD-RTO: 0 PER 100 WBC
PH UR STRIP: 5 (ref 5–8)
PLATELET # BLD AUTO: 207 K/UL (ref 135–420)
PLATELET COMMENT: ABNORMAL
PMV BLD AUTO: 11 FL (ref 9.2–11.8)
POTASSIUM SERPL-SCNC: 3.6 MMOL/L (ref 3.5–5.5)
PROCALCITONIN SERPL-MCNC: 5 NG/ML
PROT SERPL-MCNC: 5.4 G/DL (ref 6.4–8.2)
PROT UR STRIP-MCNC: 30 MG/DL
RBC # BLD AUTO: 4.29 M/UL (ref 4.2–5.3)
RBC #/AREA URNS HPF: ABNORMAL /HPF (ref 0–5)
RBC MORPH BLD: ABNORMAL
SODIUM SERPL-SCNC: 137 MMOL/L (ref 136–145)
SP GR UR REFRACTOMETRY: 1.02 (ref 1–1.03)
TROPONIN I SERPL HS-MCNC: 34 NG/L (ref 0–54)
UROBILINOGEN UR QL STRIP.AUTO: 1 EU/DL (ref 0.2–1)
WBC # BLD AUTO: 15.3 K/UL (ref 4.6–13.2)
WBC URNS QL MICRO: ABNORMAL /HPF (ref 0–5)

## 2024-04-11 PROCEDURE — 80053 COMPREHEN METABOLIC PANEL: CPT

## 2024-04-11 PROCEDURE — 70450 CT HEAD/BRAIN W/O DYE: CPT

## 2024-04-11 PROCEDURE — 74018 RADEX ABDOMEN 1 VIEW: CPT

## 2024-04-11 PROCEDURE — 96376 TX/PRO/DX INJ SAME DRUG ADON: CPT

## 2024-04-11 PROCEDURE — 71045 X-RAY EXAM CHEST 1 VIEW: CPT

## 2024-04-11 PROCEDURE — 93005 ELECTROCARDIOGRAM TRACING: CPT | Performed by: PHYSICIAN ASSISTANT

## 2024-04-11 PROCEDURE — 6360000002 HC RX W HCPCS: Performed by: PHYSICIAN ASSISTANT

## 2024-04-11 PROCEDURE — 85025 COMPLETE CBC W/AUTO DIFF WBC: CPT

## 2024-04-11 PROCEDURE — 1100000000 HC RM PRIVATE

## 2024-04-11 PROCEDURE — 2580000003 HC RX 258: Performed by: PHYSICIAN ASSISTANT

## 2024-04-11 PROCEDURE — 83605 ASSAY OF LACTIC ACID: CPT

## 2024-04-11 PROCEDURE — 87040 BLOOD CULTURE FOR BACTERIA: CPT

## 2024-04-11 PROCEDURE — 96368 THER/DIAG CONCURRENT INF: CPT

## 2024-04-11 PROCEDURE — 96365 THER/PROPH/DIAG IV INF INIT: CPT

## 2024-04-11 PROCEDURE — 99285 EMERGENCY DEPT VISIT HI MDM: CPT

## 2024-04-11 PROCEDURE — 84145 PROCALCITONIN (PCT): CPT

## 2024-04-11 PROCEDURE — 96375 TX/PRO/DX INJ NEW DRUG ADDON: CPT

## 2024-04-11 PROCEDURE — 84484 ASSAY OF TROPONIN QUANT: CPT

## 2024-04-11 PROCEDURE — 81001 URINALYSIS AUTO W/SCOPE: CPT

## 2024-04-11 RX ORDER — LEVOTHYROXINE SODIUM 0.1 MG/1
100 TABLET ORAL
Status: DISCONTINUED | OUTPATIENT
Start: 2024-04-12 | End: 2024-04-12

## 2024-04-11 RX ORDER — SODIUM CHLORIDE 9 MG/ML
INJECTION, SOLUTION INTRAVENOUS CONTINUOUS
Status: DISCONTINUED | OUTPATIENT
Start: 2024-04-11 | End: 2024-04-12

## 2024-04-11 RX ORDER — PANTOPRAZOLE SODIUM 40 MG/1
40 TABLET, DELAYED RELEASE ORAL
Status: DISCONTINUED | OUTPATIENT
Start: 2024-04-12 | End: 2024-04-12

## 2024-04-11 RX ORDER — METRONIDAZOLE 500 MG/100ML
500 INJECTION, SOLUTION INTRAVENOUS ONCE
Status: COMPLETED | OUTPATIENT
Start: 2024-04-11 | End: 2024-04-11

## 2024-04-11 RX ORDER — ACETAMINOPHEN 650 MG/1
650 SUPPOSITORY RECTAL EVERY 6 HOURS PRN
Status: DISCONTINUED | OUTPATIENT
Start: 2024-04-11 | End: 2024-04-12 | Stop reason: HOSPADM

## 2024-04-11 RX ORDER — 0.9 % SODIUM CHLORIDE 0.9 %
1000 INTRAVENOUS SOLUTION INTRAVENOUS ONCE
Status: COMPLETED | OUTPATIENT
Start: 2024-04-11 | End: 2024-04-11

## 2024-04-11 RX ORDER — ACETAMINOPHEN 325 MG/1
650 TABLET ORAL EVERY 6 HOURS PRN
Status: DISCONTINUED | OUTPATIENT
Start: 2024-04-11 | End: 2024-04-12 | Stop reason: HOSPADM

## 2024-04-11 RX ORDER — MORPHINE SULFATE 2 MG/ML
2 INJECTION, SOLUTION INTRAMUSCULAR; INTRAVENOUS EVERY 4 HOURS PRN
Status: DISCONTINUED | OUTPATIENT
Start: 2024-04-11 | End: 2024-04-12

## 2024-04-11 RX ORDER — HEPARIN SODIUM 5000 [USP'U]/ML
5000 INJECTION, SOLUTION INTRAVENOUS; SUBCUTANEOUS EVERY 8 HOURS SCHEDULED
Status: DISCONTINUED | OUTPATIENT
Start: 2024-04-11 | End: 2024-04-12

## 2024-04-11 RX ORDER — SODIUM CHLORIDE 0.9 % (FLUSH) 0.9 %
5-40 SYRINGE (ML) INJECTION EVERY 12 HOURS SCHEDULED
Status: DISCONTINUED | OUTPATIENT
Start: 2024-04-11 | End: 2024-04-12 | Stop reason: HOSPADM

## 2024-04-11 RX ORDER — SODIUM CHLORIDE 0.9 % (FLUSH) 0.9 %
5-40 SYRINGE (ML) INJECTION PRN
Status: DISCONTINUED | OUTPATIENT
Start: 2024-04-11 | End: 2024-04-12 | Stop reason: HOSPADM

## 2024-04-11 RX ORDER — ONDANSETRON 4 MG/1
4 TABLET, ORALLY DISINTEGRATING ORAL EVERY 8 HOURS PRN
Status: DISCONTINUED | OUTPATIENT
Start: 2024-04-11 | End: 2024-04-12 | Stop reason: HOSPADM

## 2024-04-11 RX ORDER — POLYETHYLENE GLYCOL 3350 17 G/17G
17 POWDER, FOR SOLUTION ORAL DAILY PRN
Status: DISCONTINUED | OUTPATIENT
Start: 2024-04-11 | End: 2024-04-12

## 2024-04-11 RX ORDER — MORPHINE SULFATE 2 MG/ML
1 INJECTION, SOLUTION INTRAMUSCULAR; INTRAVENOUS EVERY 4 HOURS PRN
Status: DISCONTINUED | OUTPATIENT
Start: 2024-04-11 | End: 2024-04-12

## 2024-04-11 RX ORDER — SODIUM CHLORIDE 9 MG/ML
INJECTION, SOLUTION INTRAVENOUS PRN
Status: DISCONTINUED | OUTPATIENT
Start: 2024-04-11 | End: 2024-04-12

## 2024-04-11 RX ORDER — MORPHINE SULFATE 4 MG/ML
4 INJECTION, SOLUTION INTRAMUSCULAR; INTRAVENOUS
Status: COMPLETED | OUTPATIENT
Start: 2024-04-11 | End: 2024-04-11

## 2024-04-11 RX ORDER — ONDANSETRON 2 MG/ML
4 INJECTION INTRAMUSCULAR; INTRAVENOUS
Status: COMPLETED | OUTPATIENT
Start: 2024-04-11 | End: 2024-04-11

## 2024-04-11 RX ORDER — ONDANSETRON 2 MG/ML
4 INJECTION INTRAMUSCULAR; INTRAVENOUS EVERY 6 HOURS PRN
Status: DISCONTINUED | OUTPATIENT
Start: 2024-04-11 | End: 2024-04-12

## 2024-04-11 RX ORDER — MORPHINE SULFATE 2 MG/ML
2 INJECTION, SOLUTION INTRAMUSCULAR; INTRAVENOUS
Status: COMPLETED | OUTPATIENT
Start: 2024-04-11 | End: 2024-04-11

## 2024-04-11 RX ADMIN — SODIUM CHLORIDE 1000 ML: 9 INJECTION, SOLUTION INTRAVENOUS at 17:18

## 2024-04-11 RX ADMIN — CEFEPIME 2000 MG: 2 INJECTION, POWDER, FOR SOLUTION INTRAVENOUS at 17:19

## 2024-04-11 RX ADMIN — ONDANSETRON 4 MG: 2 INJECTION INTRAMUSCULAR; INTRAVENOUS at 17:19

## 2024-04-11 RX ADMIN — METRONIDAZOLE 500 MG: 500 INJECTION, SOLUTION INTRAVENOUS at 17:19

## 2024-04-11 RX ADMIN — MORPHINE SULFATE 4 MG: 4 INJECTION, SOLUTION INTRAMUSCULAR; INTRAVENOUS at 19:04

## 2024-04-11 RX ADMIN — MORPHINE SULFATE 2 MG: 2 INJECTION, SOLUTION INTRAMUSCULAR; INTRAVENOUS at 17:19

## 2024-04-11 ASSESSMENT — PAIN - FUNCTIONAL ASSESSMENT: PAIN_FUNCTIONAL_ASSESSMENT: 0-10

## 2024-04-11 ASSESSMENT — PAIN DESCRIPTION - LOCATION: LOCATION: ABDOMEN

## 2024-04-11 NOTE — ED PROVIDER NOTES
Platelets 207 135 - 420 K/uL    MPV 11.0 9.2 - 11.8 FL    Nucleated RBCs 0.0 0  WBC    nRBC 0.00 0.00 - 0.01 K/uL    Neutrophils % 75 (H) 40 - 73 %    Band Neutrophils 8 (H) 0 - 5 %    Lymphocytes % 9 (L) 21 - 52 %    Monocytes % 7 3 - 10 %    Eosinophils % 0 0 - 5 %    Basophils % 0 0 - 2 %    Metamyelocytes 1 (H) 0 %    Immature Granulocytes % 0 %    Neutrophils Absolute 12.7 (H) 1.8 - 8.0 K/UL    Lymphocytes Absolute 1.4 0.9 - 3.6 K/UL    Monocytes Absolute 1.1 0.05 - 1.2 K/UL    Eosinophils Absolute 0.0 0.0 - 0.4 K/UL    Basophils Absolute 0.0 0.0 - 0.1 K/UL    Immature Granulocytes Absolute 0.0 K/UL    Differential Type MANUAL      Platelet Comment ADEQUATE PLATELETS      RBC Comment MACROCYTOSIS  1+       Comprehensive Metabolic Panel    Collection Time: 04/11/24  3:45 PM   Result Value Ref Range    Sodium 137 136 - 145 mmol/L    Potassium 3.6 3.5 - 5.5 mmol/L    Chloride 97 (L) 100 - 111 mmol/L    CO2 31 21 - 32 mmol/L    Anion Gap 9 3.0 - 18 mmol/L    Glucose 134 (H) 74 - 99 mg/dL    BUN 66 (H) 7.0 - 18 MG/DL    Creatinine 2.44 (H) 0.6 - 1.3 MG/DL    Bun/Cre Ratio 27 (H) 12 - 20      Est, Glom Filt Rate 18 (L) >60 ml/min/1.73m2    Calcium 8.5 8.5 - 10.1 MG/DL    Total Bilirubin 0.6 0.2 - 1.0 MG/DL    ALT 11 (L) 13 - 56 U/L    AST 21 10 - 38 U/L    Alk Phosphatase 90 45 - 117 U/L    Total Protein 5.4 (L) 6.4 - 8.2 g/dL    Albumin 2.7 (L) 3.4 - 5.0 g/dL    Globulin 2.7 2.0 - 4.0 g/dL    Albumin/Globulin Ratio 1.0 0.8 - 1.7     Procalcitonin    Collection Time: 04/11/24  3:45 PM   Result Value Ref Range    Procalcitonin 5.00 ng/mL   Troponin    Collection Time: 04/11/24  3:45 PM   Result Value Ref Range    Troponin, High Sensitivity 34 0 - 54 ng/L   Lactic Acid    Collection Time: 04/11/24  3:45 PM   Result Value Ref Range    Lactic Acid, Plasma 2.2 (HH) 0.4 - 2.0 MMOL/L   Urinalysis    Collection Time: 04/11/24  4:35 PM   Result Value Ref Range    Color, UA DARK YELLOW      Appearance CLOUDY         Final Result   Nonobstructive bowel gas pattern. Mild mucosal prominence suggested   in the transverse colon. Consider colitis.         CT HEAD WO CONTRAST   Final Result         No acute abnormality              PROCEDURES   Unless otherwise noted below, none  Critical Care    Performed by: Yesica Graff PA-C  Authorized by: Vel Reynolds MD    Critical care provider statement:     Critical care time (minutes):  55    Critical care time was exclusive of:  Separately billable procedures and treating other patients    Critical care was necessary to treat or prevent imminent or life-threatening deterioration of the following conditions:  Renal failure, sepsis and dehydration    Critical care was time spent personally by me on the following activities:  Blood draw for specimens, development of treatment plan with patient or surrogate, examination of patient, obtaining history from patient or surrogate, ordering and performing treatments and interventions, re-evaluation of patient's condition, review of old charts, ordering and review of laboratory studies and ordering and review of radiographic studies    I assumed direction of critical care for this patient from another provider in my specialty: no      Care discussed with: admitting provider             CRITICAL CARE TIME   See procedure    EMERGENCY DEPARTMENT COURSE and DIFFERENTIAL DIAGNOSIS/MDM   Vitals:    Vitals:    04/11/24 2100 04/11/24 2215 04/11/24 2353 04/12/24 0029   BP: 123/72 (!) 145/78 (!) 130/94    Pulse: 87 93 99    Resp:  19 20 18   Temp:  98 °F (36.7 °C) 97.4 °F (36.3 °C)    TempSrc:  Axillary Axillary    SpO2: 96% 96% 100%    Weight:   65.1 kg (143 lb 8.3 oz)    Height:           Patient was given the following medications:  Medications   levothyroxine (SYNTHROID) tablet 100 mcg (has no administration in time range)   metoprolol tartrate (LOPRESSOR) tablet 12.5 mg ( Oral Automatically Held 4/17/24 2100)   pantoprazole (PROTONIX) tablet

## 2024-04-11 NOTE — PROGRESS NOTES
Family would like to speak with provider about possible comfort measure options. Provider made aware.

## 2024-04-11 NOTE — ED TRIAGE NOTES
Pt sent from [facility] for evaluation of fall  Pt was standing, lowered to ground by  did not fall to floor  Pt on arrival not able to recall events awake; oriented x1  Pt c/o abd pain;  staff reports that pt  c/o abd pain left leg

## 2024-04-11 NOTE — ED NOTES
Assumed care of pt at this time. Assistance offered. +stool in brief, pt cleaned and new brief applied. Warm blankets provided and patient repositioned for comfort. Additional assistance offered, no needs at this time. Call bell in reach.

## 2024-04-12 ENCOUNTER — HOME CARE VISIT (OUTPATIENT)
Age: 89
End: 2024-04-12
Payer: MEDICARE

## 2024-04-12 VITALS
SYSTOLIC BLOOD PRESSURE: 147 MMHG | OXYGEN SATURATION: 97 % | TEMPERATURE: 98.7 F | HEIGHT: 61 IN | DIASTOLIC BLOOD PRESSURE: 115 MMHG | WEIGHT: 143.52 LBS | BODY MASS INDEX: 27.1 KG/M2 | HEART RATE: 97 BPM | RESPIRATION RATE: 18 BRPM

## 2024-04-12 PROBLEM — R06.02 SOB (SHORTNESS OF BREATH): Status: ACTIVE | Noted: 2024-04-12

## 2024-04-12 LAB
ANION GAP SERPL CALC-SCNC: 11 MMOL/L (ref 3–18)
BASOPHILS # BLD: 0 K/UL (ref 0–0.1)
BASOPHILS NFR BLD: 0 % (ref 0–2)
BUN SERPL-MCNC: 71 MG/DL (ref 7–18)
BUN/CREAT SERPL: 30 (ref 12–20)
CALCIUM SERPL-MCNC: 8.2 MG/DL (ref 8.5–10.1)
CHLORIDE SERPL-SCNC: 104 MMOL/L (ref 100–111)
CO2 SERPL-SCNC: 25 MMOL/L (ref 21–32)
CREAT SERPL-MCNC: 2.37 MG/DL (ref 0.6–1.3)
DIFFERENTIAL METHOD BLD: ABNORMAL
EOSINOPHIL # BLD: 0.1 K/UL (ref 0–0.4)
EOSINOPHIL NFR BLD: 1 % (ref 0–5)
ERYTHROCYTE [DISTWIDTH] IN BLOOD BY AUTOMATED COUNT: 15.1 % (ref 11.6–14.5)
GLUCOSE SERPL-MCNC: 118 MG/DL (ref 74–99)
HCT VFR BLD AUTO: 41.1 % (ref 35–45)
HGB BLD-MCNC: 13.3 G/DL (ref 12–16)
IMM GRANULOCYTES # BLD AUTO: 0.2 K/UL (ref 0–0.04)
IMM GRANULOCYTES NFR BLD AUTO: 1 % (ref 0–0.5)
LACTATE SERPL-SCNC: 2.2 MMOL/L (ref 0.4–2)
LYMPHOCYTES # BLD: 1.1 K/UL (ref 0.9–3.6)
LYMPHOCYTES NFR BLD: 7 % (ref 21–52)
MAGNESIUM SERPL-MCNC: 2.3 MG/DL (ref 1.6–2.6)
MAGNESIUM SERPL-MCNC: NORMAL MG/DL
MCH RBC QN AUTO: 33.2 PG (ref 24–34)
MCHC RBC AUTO-ENTMCNC: 32.4 G/DL (ref 31–37)
MCV RBC AUTO: 102.5 FL (ref 78–100)
MONOCYTES # BLD: 1.3 K/UL (ref 0.05–1.2)
MONOCYTES NFR BLD: 8 % (ref 3–10)
NEUTS SEG # BLD: 12.7 K/UL (ref 1.8–8)
NEUTS SEG NFR BLD: 82 % (ref 40–73)
NRBC # BLD: 0 K/UL (ref 0–0.01)
NRBC BLD-RTO: 0 PER 100 WBC
PLATELET # BLD AUTO: 187 K/UL (ref 135–420)
PMV BLD AUTO: 11.1 FL (ref 9.2–11.8)
POTASSIUM SERPL-SCNC: 3.2 MMOL/L (ref 3.5–5.5)
RBC # BLD AUTO: 4.01 M/UL (ref 4.2–5.3)
SODIUM SERPL-SCNC: 140 MMOL/L (ref 136–145)
WBC # BLD AUTO: 15.4 K/UL (ref 4.6–13.2)

## 2024-04-12 PROCEDURE — 6370000000 HC RX 637 (ALT 250 FOR IP): Performed by: INTERNAL MEDICINE

## 2024-04-12 PROCEDURE — 6370000000 HC RX 637 (ALT 250 FOR IP): Performed by: NURSE PRACTITIONER

## 2024-04-12 PROCEDURE — 2580000003 HC RX 258: Performed by: INTERNAL MEDICINE

## 2024-04-12 PROCEDURE — 36415 COLL VENOUS BLD VENIPUNCTURE: CPT

## 2024-04-12 PROCEDURE — 85025 COMPLETE CBC W/AUTO DIFF WBC: CPT

## 2024-04-12 PROCEDURE — 80048 BASIC METABOLIC PNL TOTAL CA: CPT

## 2024-04-12 PROCEDURE — 83605 ASSAY OF LACTIC ACID: CPT

## 2024-04-12 PROCEDURE — 83735 ASSAY OF MAGNESIUM: CPT

## 2024-04-12 PROCEDURE — 99223 1ST HOSP IP/OBS HIGH 75: CPT | Performed by: NURSE PRACTITIONER

## 2024-04-12 PROCEDURE — 87040 BLOOD CULTURE FOR BACTERIA: CPT

## 2024-04-12 PROCEDURE — 6360000002 HC RX W HCPCS: Performed by: NURSE PRACTITIONER

## 2024-04-12 PROCEDURE — 6360000002 HC RX W HCPCS: Performed by: INTERNAL MEDICINE

## 2024-04-12 PROCEDURE — 2700000000 HC OXYGEN THERAPY PER DAY

## 2024-04-12 PROCEDURE — 6360000002 HC RX W HCPCS: Performed by: HOSPITALIST

## 2024-04-12 RX ORDER — LORAZEPAM 2 MG/ML
1 INJECTION INTRAMUSCULAR
Status: DISCONTINUED | OUTPATIENT
Start: 2024-04-12 | End: 2024-04-12 | Stop reason: HOSPADM

## 2024-04-12 RX ORDER — ONDANSETRON 4 MG/1
4 TABLET, FILM COATED ORAL EVERY 8 HOURS PRN
COMMUNITY

## 2024-04-12 RX ORDER — SCOLOPAMINE TRANSDERMAL SYSTEM 1 MG/1
1 PATCH, EXTENDED RELEASE TRANSDERMAL
Status: DISCONTINUED | OUTPATIENT
Start: 2024-04-12 | End: 2024-04-12 | Stop reason: HOSPADM

## 2024-04-12 RX ORDER — POLYETHYLENE GLYCOL 3350 17 G/17G
17 POWDER, FOR SOLUTION ORAL DAILY PRN
Status: DISCONTINUED | OUTPATIENT
Start: 2024-04-12 | End: 2024-04-12 | Stop reason: HOSPADM

## 2024-04-12 RX ORDER — TRAMADOL HYDROCHLORIDE 50 MG/1
50 TABLET ORAL 4 TIMES DAILY PRN
COMMUNITY

## 2024-04-12 RX ORDER — POLYETHYLENE GLYCOL 3350 17 G/17G
17 POWDER, FOR SOLUTION ORAL PRN
COMMUNITY

## 2024-04-12 RX ORDER — DIAZEPAM 5 MG/ML
2.5 INJECTION, SOLUTION INTRAMUSCULAR; INTRAVENOUS EVERY 4 HOURS PRN
Status: DISCONTINUED | OUTPATIENT
Start: 2024-04-12 | End: 2024-04-12

## 2024-04-12 RX ORDER — ONDANSETRON 2 MG/ML
4 INJECTION INTRAMUSCULAR; INTRAVENOUS EVERY 6 HOURS PRN
Status: DISCONTINUED | OUTPATIENT
Start: 2024-04-12 | End: 2024-04-12 | Stop reason: HOSPADM

## 2024-04-12 RX ORDER — MORPHINE SULFATE 4 MG/ML
4 INJECTION, SOLUTION INTRAMUSCULAR; INTRAVENOUS
Status: DISCONTINUED | OUTPATIENT
Start: 2024-04-12 | End: 2024-04-12 | Stop reason: HOSPADM

## 2024-04-12 RX ORDER — MORPHINE SULFATE/PF 50 MG/50ML
PATIENT CONTROLLED ANALGESIA SYRINGE INTRAVENOUS
Status: DISCONTINUED | OUTPATIENT
Start: 2024-04-12 | End: 2024-04-12

## 2024-04-12 RX ORDER — MORPHINE SULFATE 2 MG/ML
2 INJECTION, SOLUTION INTRAMUSCULAR; INTRAVENOUS
Status: DISCONTINUED | OUTPATIENT
Start: 2024-04-12 | End: 2024-04-12

## 2024-04-12 RX ORDER — MORPHINE SULFATE/PF 50 MG/50ML
PATIENT CONTROLLED ANALGESIA SYRINGE INTRAVENOUS
Status: DISCONTINUED | OUTPATIENT
Start: 2024-04-12 | End: 2024-04-12 | Stop reason: HOSPADM

## 2024-04-12 RX ADMIN — HEPARIN SODIUM 5000 UNITS: 5000 INJECTION INTRAVENOUS; SUBCUTANEOUS at 06:31

## 2024-04-12 RX ADMIN — MORPHINE SULFATE 4 MG: 4 INJECTION, SOLUTION INTRAMUSCULAR; INTRAVENOUS at 10:57

## 2024-04-12 RX ADMIN — LEVOTHYROXINE SODIUM 100 MCG: 0.1 TABLET ORAL at 06:31

## 2024-04-12 RX ADMIN — LORAZEPAM 1 MG: 2 INJECTION, SOLUTION INTRAMUSCULAR; INTRAVENOUS at 12:25

## 2024-04-12 RX ADMIN — MORPHINE SULFATE 30 MG: 1 INJECTION INTRAVENOUS at 13:10

## 2024-04-12 RX ADMIN — SODIUM CHLORIDE: 9 INJECTION, SOLUTION INTRAVENOUS at 00:31

## 2024-04-12 RX ADMIN — MORPHINE SULFATE 2 MG: 2 INJECTION, SOLUTION INTRAMUSCULAR; INTRAVENOUS at 06:45

## 2024-04-12 RX ADMIN — PANTOPRAZOLE SODIUM 40 MG: 40 TABLET, DELAYED RELEASE ORAL at 06:31

## 2024-04-12 RX ADMIN — HEPARIN SODIUM 5000 UNITS: 5000 INJECTION INTRAVENOUS; SUBCUTANEOUS at 00:30

## 2024-04-12 RX ADMIN — MORPHINE SULFATE 2 MG: 2 INJECTION, SOLUTION INTRAMUSCULAR; INTRAVENOUS at 00:29

## 2024-04-12 RX ADMIN — MORPHINE SULFATE 2 MG: 2 INJECTION, SOLUTION INTRAMUSCULAR; INTRAVENOUS at 09:50

## 2024-04-12 RX ADMIN — SODIUM CHLORIDE, PRESERVATIVE FREE 10 ML: 5 INJECTION INTRAVENOUS at 00:33

## 2024-04-12 ASSESSMENT — PAIN DESCRIPTION - LOCATION
LOCATION: ABDOMEN

## 2024-04-12 ASSESSMENT — PAIN SCALES - GENERAL
PAINLEVEL_OUTOF10: 6
PAINLEVEL_OUTOF10: 6
PAINLEVEL_OUTOF10: 10

## 2024-04-12 ASSESSMENT — PAIN DESCRIPTION - DESCRIPTORS
DESCRIPTORS: ACHING
DESCRIPTORS: PATIENT UNABLE TO DESCRIBE
DESCRIPTORS: ACHING;SHARP

## 2024-04-12 ASSESSMENT — PAIN DESCRIPTION - ORIENTATION
ORIENTATION: ANTERIOR
ORIENTATION: ANTERIOR

## 2024-04-12 ASSESSMENT — PAIN DESCRIPTION - PAIN TYPE: TYPE: ACUTE PAIN

## 2024-04-12 ASSESSMENT — ENCOUNTER SYMPTOMS: HEMOPTYSIS: 0

## 2024-04-12 NOTE — PROGRESS NOTES
MICHELLE followed up with patient ERIBERTO, lucina Montemayor [phone]# at patient's bedside regarding FOC selected providers and discuss discharge planning.  ERIBERTO signed FOC for [facility] as the hospice provider, stated their preference of GIP, and referrals sent to [facility], [facility], [facility], [facility], and [facility] for post-discharge comfort measure services. [facility] provided a copy of signed FOC.      BILLYA advised patient has a pending Medicaid application, which was submitted 10 days ago.     MICHELLE notified [facility] Hospice Rep, Aubree, family select their agency as the hospice provider. Confirmation received that patient was accepted by [facility] for hospice care- comfort measure and the GIP.     MICHELLE notified CMS to submit referral.    MICHELLE also submitted request to [employer] to assist with the pending Medicaid application.  SW to continue to follow.    JESSICA Bell  Supervisee in Social Work  Care Management Department

## 2024-04-12 NOTE — PROGRESS NOTES
Patient received Sacrament of the Sick by  [facility]ong of [facility] on 4/12/24.     Entered By:  Magi Herrmann  [phone] - Office

## 2024-04-12 NOTE — PROGRESS NOTES
Liaison met with patient's daughters Anaya Cuadra and Lovely De La Fuente and daughter's son-in-law in waiting area.  Discussed [facility] philosophy, services, criteria, IDT, and GIP.  Family expressed their desire for patient to have comfort care.  Explained patient meets criteria for inpatient hospice due to her uncontrolled symptoms.  Family acknowledged understanding and are in agreement with inpatient hospice.  Discharge plan is pending at this time.  Family to continue working with  on safe discharge plan.    1350 Hospice consents signed by daughter Anaya.  Inpatient Hospice admission is in progress.    1415 Spoke to MICHELLE Montanez, informed SW of above.  SW to request discharge order from Attending.    Thank you for the referral to [facility]. If we can be of further assistance please contact [phone].      Aubree Montero MDIV, BSN, RN   Hospice Liaison   [address], [address]   [place] [address]   Office: [phone]  Fax: [phone]   Mobile:  [phone]   Email: [email or web address]

## 2024-04-12 NOTE — PROGRESS NOTES
Patient is admitted to inpatient hospice with hospice diagnosis of Advanced Alzheimer’s Dementia.    Please contact [facility] 24/7 at [phone] to report changes in patient's status.    Aubree Montero MDIV, BSN, RN   Hospice Liaison   [address], [address]   [place] [address]   Office: [phone]  Fax: [phone]   Mobile:  [phone]   Email: [email or web address]

## 2024-04-12 NOTE — PROGRESS NOTES
MICHELLE received the request from [facility] Rep, Aubree, requesting the attending hospitalist notified to place the discharge order for patient to transfer to the GIP. MICHELLE sent a Perfect[employer] text to hospitalist, Dr. Samayoa, to enter the discharge order for patient to discharge to the [facility]. MICHELLE to continue to follow.       JESSICA Bell  Supervisee in Social Work  Care Management Department

## 2024-04-12 NOTE — PROGRESS NOTES
0730 Hospice referral received.  Chart review in progress. Will contact  to request [place] [facility] be completed prior to Liaison meeting with patient/family.      Aubree Montero MDIV, BSN, RN  Hospice Liaison  [address], [address]  [place] [address]  Office: [phone]  Fax: [phone]  Mobile:  [phone]  Email: [email or web address]

## 2024-04-12 NOTE — PROGRESS NOTES
conducted an initial consultation and spiritual assessment for Alesha Montemayor, who is a 89 y.o.,female.     Initiated a relationship of care and support.   Explored issues of grace, belief, spirituality and Baptism/ritual needs.  Listened empathically.  Provided information about Spiritual Care Services.   confirmed Patient's Sabianism Affiliation.  Patient processed feelings about current hospitalization.  Offered prayer and assurance of continued prayers on patients behalf.   Chart reviewed.  Patient expressed gratitude for Spiritual Care visit.    Chaplains will continue to follow and will provide pastoral care as needed or requested.    recommends bedside caregivers page  on duty if patient shows signs of acute spiritual or emotional distress.    Chaplain Kimberly Del Angel M.Div.    [phone] - Office

## 2024-04-12 NOTE — PROGRESS NOTES
0730  Patient groaning in pain. Morphine 2 mg already given at 645 by the night nurse.    0900  Patient still in pain. 2 mg morphine given at this time.    1230  Administered Ativan 1  mg to patient. Patient sleeping at this time.    1310   Started Morphine PCA 1 mg/ml at this time.

## 2024-04-12 NOTE — H&P
stenosis    PLAN:    Patient is currently being admitted under hospice care for GIP for uncontrolled symptoms like abdominal pain and shortness of breath management.  Patient also has worsening encephalopathy in setting of primary hospice diagnosis of Alzheimer dementia and failure to thrive.  Patient was transition to comfort measures during this hospital course but symptoms were not controlled and hospice was consulted.  Patient will be placed on morphine drip in addition to sublingual as needed morphine.  Will also continue sublingual Ativan as needed.  For shortness of breath, patient will be placed on scopolamine patch in addition to as needed DuoNebs and atropine.  Patient's family is in agreement with this plan.  Patient is DNR/DNI.    Total time to take care of this patient was more than 55 minutes    Disclaimer: Sections of this note are dictated using utilizing voice recognition software, which may have resulted in some phonetic based errors in grammar and contents. Even though attempts were made to correct all the mistakes, some may have been missed, and remained in the body of the document. If questions arise, please contact our department.    Gerson Odonnell MD  April 12, 2024

## 2024-04-12 NOTE — PROGRESS NOTES
TRANSFER - IN REPORT:    Verbal report received from FAVIAN Neumann on Alesha Montemayor  being received from Favian Zavala for routine progression of patient care      Report consisted of patient's Situation, Background, Assessment and   Recommendations(SBAR).     Information from the following report(s) Nurse Handoff Report, Intake/Output, and MAR was reviewed with the receiving nurse.    Opportunity for questions and clarification was provided.      Assessment completed upon patient's arrival to unit and care assumed.      Patient admitted with perforated appendicitis, EDGAR, peritonitis, metabolic encephalopathy, and failure to thrive.  Patient oriented to person and knows in hospital setting.  Hospice has been consulted.  Patient with skin tear to LLE and covered with mepilex dressing due to injury with w/c at [facility].  Redness to david area and sacrum without open wounds.  As per ER nurse, purwick discontinued per family request.  Adult diapers in place.      0029 - Patient moaning and grimacing with FLACC score at 10.  Morphine 2 mg IV given.    0130 - Patient with eyes clothes, resting quietly.    0408 - Lactic 2.2.  Dr. Vivar notified.  No new orders at this time.    0615 - Patient found awake this morning, moaning with FLACC score at 6, IV pulled out with blood on gown and bedpad.      0645 - New IV access obtained, #22 to LAC.  Morphine 2 mg IV given.  Activity pad given to patient.  Avasure camera available at bedside, but not on at this time.  Will monitor for need for cameral monitoring.  Bed alarm in place.    0715 - Patient resting quietly at this time.  Will keep Avasure camera off for now.  Bed alarm in place.    0800 - Patient's son at bedside.  Bed alarm on.    0720 - Bedside and Verbal shift change report given to FAVIAN Reyes (oncoming nurse) by FAVIAN Zavala (offgoing nurse). Report included the following information Nurse Handoff Report, Intake/Output, and MAR.

## 2024-04-12 NOTE — CONSULTS
Palliative Medicine  Patient Name: Alesha Montemayor  YOB: 1927  MRN: [id number]  Age: 89 y.o.  Gender: female    Date of Initial Consult: 4/12/2024  Date of Service: 4/12/2024  Time: 11:20 AM  Provider: ALICE Dave NP  Hospital Day: 2  Admit Date: 4/11/2024  Referring Provider: Yesica Graff PA-C        Reasons for Consultation:  Goals of Care    HISTORY OF PRESENT ILLNESS (HPI):   Alesha Montemayor is a 89 y.o. female with a past medical history of hypertension, atrial fibrillation, severe aortic stenosis, hypothyroidism, and recent hospitalization 2/2024 for  acute appendicitis with perforation and patient received conservative management as patient is high risk for any surgical intervention, who was admitted on 4/11/2024 from [facility] with a diagnosis of Abdominal pain, suspect related to recently diagnosed perforated appendicitis, with peritonitis, acute renal failure, acute metabolic encephalopathy, fall PTA, and failure to thrive. Patient has been admitted under comfort measures only and hospice has been consulted.       PALLIATIVE DIAGNOSES:    Goals of care discussions/advance care planning  Abdominal pain/appendicitis/ peritonitis  Failure to thrive  Debility  Comfort measures only status    ASSESSMENT AND PLAN:     Goals of care discussions/advance care planning: Palliative medicine team including Tamera Cole RN and I met with patient at patient's bedside.  Patient is lying in bed, eyes open, facial grimacing, crying and moaning in pain.  She is nonverbal, not able to describe her pain or quantify it.  She continuously rubs her abdomen, and is attempting to reposition in bed continuously.  Patient appears very uncomfortable.  Patient's 2 children/legal next of kin Anaya and Marc at bedside.  Introduced our role as palliative medicine team.  Anaya and Marc confirm decisions for comfort measures only, and are very concerned because  their

## 2024-04-12 NOTE — ACP (ADVANCE CARE PLANNING)
Advance Care Planning     Palliative Team Advance Care Planning (ACP) Conversation    Date of Conversation: 04/12/24    Key individuals present for the conversation:  Legal next of kin: Anaya Cuadra, daughter, Marc Montemayor, son    ACP documents on file prior to discussion:    [] Advance Directive  [] Portable DNR  [x] POST  [] [facility]  [] None    Healthcare Decision Maker:    Primary Decision Maker: Anaya Cuadra - Child - [phone]    Primary Decision Maker: Marc Montemayor - Child - [phone]     0945 Seen today in room 329 along with Britt Estevez NP.  Lying supine with eyes closed. Occasionally reaching out with arms to people. Groaning frequently. No verbalization. Difficult to console. Given IV morphine per clinical nurse.     Orders placed for PCA. Discussed with nurse to give valium. Comfort orders updated    Came to the ED 4/11/2024 from [facility] per EMS after a fall and abdominal pain. She has had a rapid decline in mental status over the past three days. Elevated WBC(15.3), elevated lactic acid (2.2), elevated creatinine (2.44). Family decided in ED to change focus of care to comfort    PMH significant for aortic stenosis, diverticulitis, HTN, atrial fibrillation, breast cancer, CHF (information gathered from medical records)    Admitted to medical unit for abdominal pain (suspect peritonitis 2/2 recent appendiceal perforation), ARF, metabolic encephalopathy. Hospice consulted. Comfort medication orders placed.    Palliative Medicine consultation placed by MATT Gu for consideration of hospice.     Conversation Summary:  Mrs Montemayor's children, Anaya and Marc, were in the room. Introduced role of palliative care to the hospitalized patient. They were familiar with our team from their mother's February 2024 admission. They said she was discharged home, went to [place], and then went to [facility]. This past Monday she was able to participate  in a multidisciplinary meeting regarding her progress. They are very comfortable with changing care goals to comfort based but are not sure about location. Anaya would like to entertain taking her mother home but does not think she is able to physically meet her mother's needs. Care management team actively speaking with family about discharge options.     Patient is not stable for discharge at this time. We have consulted hospice requesting GIP evaluation for symptom management    Disposition plan: to be determined based on input from hospice and family    Palliative care will continue to follow Alesha Montemayor  and her family during her hospitalization and support them as they make healthcare decisions and define goals of care.    Resuscitation Status:   DNR/DNI; comfort measures    Completed Documentation:    [] Advance Directive  [] Portable DNR  [] POLST  [] [facility]  [x] No new documents completed    Tamera Cole, RN, MSN  Palliative Medicine  [phone]

## 2024-04-12 NOTE — H&P
HISTORY & PHYSICAL      Patient: Alesha Montemayor MRN: [id number]  [facility]: [id number]    YOB: 1927  Age: 89 y.o.  Sex: female    DOA: 4/11/2024 LOS:  LOS: 0 days        DOA: 4/11/2024        Assessment/Plan     89 years old presented with abdominal pain, fall prior to admission, and failure to thrive.     -Abdominal pain, suspect related to recently diagnosed perforated appendicitis, with peritonitis  -Acute renal failure, suspect prerenal due to decreased oral intake  -Metabolic encephalopathy, suspect on top of baseline dementia  -Failure to thrive  -Hypertension  -Severe aortic stenosis  -Fall, prior to admission  -Comfort care measures patient.  -Other medical problems as below    The patient is admitted to [facility]  No antibiotics per family  Morphine for pain control  Symptomatic management  Hospice coordination consult in a.m.  IV fluids for now  Hold home antihypertensives  Repeat BMP and CBC in a.m.    The patient is DNR/DNI, with plan to transition to hospice discharge      Patient Active Problem List   Diagnosis    Rib contusion, right, initial encounter    Right shoulder injury, initial encounter    Fall    Acute exacerbation of CHF (congestive heart failure) (HCC)    HTN (hypertension)    Hypothyroidism    New onset atrial fibrillation (HCC)    Aortic stenosis    Hypomagnesemia    Multiple rib fractures    Chronic congestive heart failure (HCC)    Chronic atrial fibrillation (HCC)    Multiple falls    Frail elderly    ACP (advance care planning)    Acute appendicitis with perforation and localized peritonitis           History of Presenting Illness:  89 years old who is admitted for abdominal pain, failure to thrive, acute renal failure, leukocytosis and a fall prior to admission.  The patient presented to ED from the nursing facility with complaint of having abdominal pain.  She reportedly had a fall, however was lowered gradually to the floor.  There was no report of seizure activity,

## 2024-04-12 NOTE — PROGRESS NOTES
0830 Discussed patient with MICHELLE Montanez.  SW to contact family to discuss [place] of Choice then get back to Liaison with family’s decision re hospice agency they wish to use.      Aubree Montero MDIV, BSN, RN  Hospice Liaison  [address], [address]  [place] [address]  Office: [phone]  Fax: [phone]  Mobile:  [phone]  Email: [email or web address]

## 2024-04-12 NOTE — PROGRESS NOTES
1114AM  Discussed patient with Palliative Provider, Britt.  Palliative is requesting evaluation  for GIP.  [facility] will evaluate for GIP.      1143AM  Chart reviewed.  Patient appears to be appropriate for GIP admission.  Discussed patient with MICHELLE Montanez.  Per MICHELLE, family is agreeable to using [facility]. Advised Liaison is in route to [facility] to complete GIP admission.  Palliative notified.    Aubree Montero MDIV, BSN, RN  Hospice Liaison  [address], [address]  [place] [address]  Office: [phone]  Fax: [phone]  Mobile:  [phone]  Email: [email or web address]

## 2024-04-12 NOTE — CARE COORDINATION
04/12/24 0952   Service Assessment   Patient Orientation Other (see comment)  (Patient in extreme pain - MPOA present at bedside)   Cognition   (Patient in extreme pain - MPOA present at bedside)   History Provided By Child/Family   Primary Caregiver Family   Accompanied By/Relationship The two MPOA- Patient's son - Marc Montemayor # [phone] and daughter - Anaya Cuadra # [phone]   Support Systems Family Members   Patient's Healthcare Decision Maker is: Legal Next of Kin   PCP Verified by CM Yes  (Family verified patient's PCP as Khushi Galan)   Last Visit to PCP Within last 3 months   Prior Functional Level Assistance with the following:;Bathing;Dressing;Toileting;Feeding;Cooking;Housework;Shopping;Mobility   Current Functional Level Assistance with the following:;Bathing;Dressing;Toileting;Feeding;Cooking;Housework;Shopping;Mobility   Can patient return to prior living arrangement Unknown at present   Ability to make needs known: Unable   Family able to assist with home care needs: Other (comment)  (Family member has opted for comfort measure - hospice services)   Would you like for me to discuss the discharge plan with any other family members/significant others, and if so, who? Yes  (Patient has MPOA listed as her son Marc Montemayor and daughter - Anaya Cuadra)   Financial Resources Medicaid   Community Resources Assisted Living  (Patient is a resides at [facility] # [phone])   CM/SW Referral Other (see comment)  (N/A)   Discharge Planning   Type of Residence (!) Hospice Facility;Skilled Nursing Facility   Living Arrangements Other (Comment)  (Resident of [facility] # [phone])   Current Services Prior To Admission Durable Medical Equipment   Current DME Prior to Arrival Walker   Potential Assistance Needed Skilled Nursing Facility;Other (Comment)  (Hospice Services)   DME Ordered? No   Potential Assistance Purchasing Medications No   Patient expects to be  -daughter - Anaya Cuadra. The family advised patient cannot return to the daughter's address for hospice care and they stated their preference of SNF placement or at [facility] for comfort care/hospice. SW provided the family the SNF list and Hospice list for review. SW will continue to follow for discharge planning.    JESSICA Bell  Supervisee in Social Work  Care Management Department

## 2024-04-12 NOTE — PROGRESS NOTES
Wasted/witnessed Morphine  mg/30 ml with primary nurse Haydee RN per MD orders.  See new orders for Comfort measures.  PCA syringe amount wasted 30 ml and documented on controlled substance administration record.  Record with both RN signatures delivered to pharmacy

## 2024-04-12 NOTE — PROGRESS NOTES
Bedside shift change report given to Bonifacio   (oncoming nurse) by Sabrina Hodgson RN   (offgoing nurse). Report included the following information Nurse Handoff Report, Index, Intake/Output, MAR, and Recent Results.

## 2024-04-12 NOTE — PROGRESS NOTES
Hospitalist Progress Note    Patient: Alesha Montemayor MRN: [id number]  CSN: [id number]    YOB: 1927  Age: 89 y.o.  Sex: female    DOA: 4/11/2024 LOS:  LOS: 1 day          Chief Complaint:  Abd pain        Assessment/Plan       89 years old presented with abdominal pain, fall prior to admission, and failure to thrive.      -Abdominal pain, suspect related to recently diagnosed perforated appendicitis, with peritonitis  -Acute renal failure, suspect prerenal due to decreased oral intake  -Metabolic encephalopathy, suspect on top of baseline dementia  -Failure to thrive  -Hypertension  -Severe aortic stenosis  -Fall, prior to admission  -Comfort care measures patient.  -Other medical problems as below    Discussed with son and reviewed labs with him  His focus for her is comfort and pain/agitation ocntrol  She is delerious     Hospice consult    Palliative team has ordered PCA pump  And wants [facility] hospice for her    Disposition :  Active Hospital Problems    Diagnosis     Abdominal pain [R10.9]     ARF (acute renal failure) ([facility]) [N17.9]        Subjective:    Has been in pain this am  Delerious per son  Has been trying to climb out of bed  Restful when I saw her but heard moaning when I passed by her room later on rounds  Pall team has seen her  Morphine has been given  PCA prderd also    Review of systems:    UTO due to mentation      Vital signs/Intake and Output:  Visit Vitals  BP (!) 130/94   Pulse 74   Temp 97.5 °F (36.4 °C) (Axillary)   Resp 18   Ht 1.549 m (5' 1\")   Wt 65.1 kg (143 lb 8.3 oz)   SpO2 97%   BMI 27.12 kg/m²     Current Shift:  No intake/output data recorded.  Last three shifts:  04/10 1901 - 04/12 0700  In: 1183.6   Out: -     Exam:    General: frail thin elderly WF lethargic moves intermittently  Exam deferred due to her delrium, and son noting she had been very uncomfortable this am and had just rested  Extremities: No C/C/E  Neuro:grossly normal , follows  commands  Psych:appropriate    Labs: Results:       Chemistry Recent Labs     04/11/24  1545 04/12/24  0302   GLUCOSE 134* 118*    140   K 3.6 3.2*   CL 97* 104   CO2 31 25   BUN 66* 71*   CREATININE 2.44* 2.37*   CALCIUM 8.5 8.2*   BILITOT 0.6  --    AST 21  --    ALT 11*  --    ALKPHOS 90  --    PROT 5.4*  --    GLOB 2.7  --    AGRATIO 1.0  --    LABGLOM 18* 18*      CBC w/Diff Recent Labs     04/11/24  1545 04/12/24  0302   WBC 15.3* 15.4*   RBC 4.29 4.01*   HGB 14.2 13.3   HCT 43.1 41.1    187   LYMPHOPCT 9* 7*      Cardiac Enzymes No results for input(s): \"CKTOTAL\", \"CKMB\", \"CKMBINDEX\", \"TROPONINI\", \"ALEKS\" in the last 72 hours.   Coagulation No results for input(s): \"PROTIME\", \"INR\", \"APTT\" in the last 72 hours.    Lipid Panel No results found for: \"CHOL\", \"TRIG\", \"HDL\", \"LDLCHOLESTEROL\", \"LDLCALC\", \"VLDL\", \"CHOLHDLRATIO\"   BNP No results for input(s): \"BNP\" in the last 72 hours.   Liver Enzymes Recent Labs     04/11/24  1545   ALT 11*   AST 21   ALKPHOS 90   BILITOT 0.6      Thyroid Studies Lab Results   Component Value Date/Time    TSH 6.23 01/02/2023 01:45 PM          Procedures/imaging: see electronic medical records for all procedures/Xrays and details which were not copied into this note but were reviewed prior to creation of Plan      Trinity Samayoa MD

## 2024-04-12 NOTE — PROGRESS NOTES
Patient now inpatient Hospice.  Orders reviewed plan of care updated.  New order for PCA Morphine.  Witnessed/wasted 30 ml via.  Total dose given 8 mg, wasted 22 mg. Witnessed with FAVIAN Reyna.

## 2024-04-13 ENCOUNTER — HOME CARE VISIT (OUTPATIENT)
Age: 89
End: 2024-04-13
Payer: MEDICARE

## 2024-04-13 VITALS — TEMPERATURE: 98 F | RESPIRATION RATE: 18 BRPM | HEART RATE: 120 BPM | OXYGEN SATURATION: 97 %

## 2024-04-13 LAB
EKG DIAGNOSIS: NORMAL
EKG Q-T INTERVAL: 408 MS
EKG QRS DURATION: 144 MS
EKG QTC CALCULATION (BAZETT): 496 MS
EKG R AXIS: -73 DEGREES
EKG T AXIS: 10 DEGREES
EKG VENTRICULAR RATE: 89 BPM

## 2024-04-13 PROCEDURE — G0299 HHS/HOSPICE OF RN EA 15 MIN: HCPCS

## 2024-04-13 NOTE — HOSPICE
GIP visit. Patient is non responsive. Daughter bedside. No signs of non verbal pain noted. Gurgling secretions noted. Recommended giving PRN atropine. Care coordinated with hospital nurse Patricia, she reports that pt has been comfortable all day. Advised to call hospice with any concerns. Support and education provided to daughter.

## 2024-04-13 NOTE — PROGRESS NOTES
Bedside and Verbal shift change report given to Patricia (oncoming nurse) by Marah (offgoing nurse). Report included the following information Nurse Handoff Report, Index, Intake/Output, and MAR.

## 2024-04-13 NOTE — HOSPICE
Per discussion with Palliative provider and documentation, patient continues to have uncontrolled pain and discomfort despite interventions.  Patient received several doses of IV Morphine without relief.   PCA Morphine is being initiated for managment of uncontrolled symptoms.  Patient meets criteria for inpatient hospiice level of care due to uncontrolled symptoms.      Met with patient's daughters Anaya Cuadra and Lovely De La Fuente and daughter's son-in-law in the waiting area.  Discussed [facility] philosophy, services, IDT, and critieria.  Family acknowledged understanding and expressed their desire for patient to be comfortable.  Family is concerned that patient is experiencing pain.  Educated family on General Inpatient Hospice and explained that patient meets criteria for this level of care at this time.  Family expressed understanding and wish to proceed with inpatient hospice.      Hospice consents were signed by patient's daughter Anaya.    Aubree Montero MDIV, BSN, RN   Hospice Liaison   [address], [address]   [place] [address]   Office: [phone]  Fax: [phone]   Mobile:  [phone]   Email: [email or web address]?

## 2024-04-13 NOTE — PROGRESS NOTES
Patient seen today by this hospice nurse. Daughter bedside. Patient non responsive. No signs of discomfort noted. Pt has gurgling secretions. Recommended utilizing atropine. Care coordination with hospital nurse, Patricia. Patricia reports pt has been comfortable all day, no PRN’s have been given.

## 2024-04-13 NOTE — PLAN OF CARE
Problem: Pain  Goal: Verbalizes/displays adequate comfort level or baseline comfort level  Outcome: Progressing     Problem: Skin/Tissue Integrity  Goal: Absence of new skin breakdown  Description: 1.  Monitor for areas of redness and/or skin breakdown  2.  Assess vascular access sites hourly  3.  Every 4-6 hours minimum:  Change oxygen saturation probe site  4.  Every 4-6 hours:  If on nasal continuous positive airway pressure, respiratory therapy assess nares and determine need for appliance change or resting period.  Outcome: Progressing

## 2024-04-13 NOTE — PLAN OF CARE
Problem: Chronic Conditions and Co-morbidities  Goal: Patient's chronic conditions and co-morbidity symptoms are monitored and maintained or improved  Outcome: Progressing     Problem: Safety - Adult  Goal: Free from fall injury  Outcome: Progressing     Problem: Pain  Goal: Verbalizes/displays adequate comfort level or baseline comfort level  4/13/2024 1113 by Patricia Brennan RN  Outcome: Progressing  4/12/2024 2221 by Marah Ortiz RN  Outcome: Progressing     Problem: Skin/Tissue Integrity  Goal: Absence of new skin breakdown  Description: 1.  Monitor for areas of redness and/or skin breakdown  2.  Assess vascular access sites hourly  3.  Every 4-6 hours minimum:  Change oxygen saturation probe site  4.  Every 4-6 hours:  If on nasal continuous positive airway pressure, respiratory therapy assess nares and determine need for appliance change or resting period.  4/12/2024 2221 by Marah Ortiz, RN  Outcome: Progressing

## 2024-04-14 ENCOUNTER — HOME CARE VISIT (OUTPATIENT)
Age: 89
End: 2024-04-14
Payer: MEDICARE

## 2024-04-14 VITALS — OXYGEN SATURATION: 96 % | RESPIRATION RATE: 12 BRPM | TEMPERATURE: 99 F | HEART RATE: 120 BPM

## 2024-04-14 LAB
BACTERIA SPEC CULT: NORMAL
BACTERIA SPEC CULT: NORMAL
SERVICE CMNT-IMP: NORMAL
SERVICE CMNT-IMP: NORMAL

## 2024-04-14 PROCEDURE — G0299 HHS/HOSPICE OF RN EA 15 MIN: HCPCS

## 2024-04-14 NOTE — PLAN OF CARE
Problem: Chronic Conditions and Co-morbidities  Goal: Patient's chronic conditions and co-morbidity symptoms are monitored and maintained or improved  4/13/2024 2218 by Cira Damico RN  Outcome: Progressing  4/13/2024 1113 by Patricia Brennan RN  Outcome: Progressing     Problem: Safety - Adult  Goal: Free from fall injury  4/13/2024 2218 by Cira Damico RN  Outcome: Progressing  4/13/2024 1113 by Patricia Brennan RN  Outcome: Progressing     Problem: Pain  Goal: Verbalizes/displays adequate comfort level or baseline comfort level  4/13/2024 2218 by Cira Damico RN  Outcome: Progressing  4/13/2024 1113 by Patricia Brennan RN  Outcome: Progressing     Problem: Skin/Tissue Integrity  Goal: Absence of new skin breakdown  Description: 1.  Monitor for areas of redness and/or skin breakdown  2.  Assess vascular access sites hourly  3.  Every 4-6 hours minimum:  Change oxygen saturation probe site  4.  Every 4-6 hours:  If on nasal continuous positive airway pressure, respiratory therapy assess nares and determine need for appliance change or resting period.  Outcome: Progressing

## 2024-04-14 NOTE — PROGRESS NOTES
Patient seen by this hospice nurse today. No family present. Patient is comfortable, non responsive. Continues to transition to end of life. Care coordination with hospital nurse Patricia. No PRNs being utilized at this time.

## 2024-04-14 NOTE — PROGRESS NOTES
2348  Bedside and verbal shift change report given to LITZY Santoro RN (on coming nurse) by FAVIAN Denis (off going nurse). Report included the following information SBAR, Kardex, Intake/Output and MAR.    0015  Pt resting quietly.    0146  PCA Morphine completed.  New cylinder Morphine 1mg/mL (30mg)    0632  Incontinence care completed.  Position changed.  No pressure wound noted on pt's sacrum.  Oral care provided.    0717  Bedside and verbal shift change report given to FAVIAN Gomez (on coming nurse) by LIZTY Santoro RN (off going nurse). Report included the following information SBAR, Kardex, Intake/Output and MAR.

## 2024-04-14 NOTE — PLAN OF CARE
Problem: Pain  Goal: Verbalizes/displays adequate comfort level or baseline comfort level  4/14/2024 0241 by Iveth Santoro, FAVIAN  Outcome: Progressing  4/13/2024 2218 by Cira Damico, RN  Outcome: Progressing     Problem: Skin/Tissue Integrity  Goal: Absence of new skin breakdown  Description: 1.  Monitor for areas of redness and/or skin breakdown  2.  Assess vascular access sites hourly  3.  Every 4-6 hours minimum:  Change oxygen saturation probe site  4.  Every 4-6 hours:  If on nasal continuous positive airway pressure, respiratory therapy assess nares and determine need for appliance change or resting period.  4/14/2024 0241 by Iveth Santoro, FAVIAN  Outcome: Progressing  4/13/2024 2218 by Cira Damico, RN  Outcome: Progressing

## 2024-04-14 NOTE — PLAN OF CARE
Problem: Chronic Conditions and Co-morbidities  Goal: Patient's chronic conditions and co-morbidity symptoms are monitored and maintained or improved  4/13/2024 2218 by Cira Damico RN  Outcome: Progressing     Problem: Safety - Adult  Goal: Free from fall injury  4/13/2024 2218 by Cira Damico RN  Outcome: Progressing     Problem: Pain  Goal: Verbalizes/displays adequate comfort level or baseline comfort level  4/14/2024 0759 by Particia Brennan RN  Outcome: Progressing  4/14/2024 0241 by Iveth Santoro RN  Outcome: Progressing  4/13/2024 2218 by Cira Damico RN  Outcome: Progressing     Problem: Skin/Tissue Integrity  Goal: Absence of new skin breakdown  Description: 1.  Monitor for areas of redness and/or skin breakdown  2.  Assess vascular access sites hourly  3.  Every 4-6 hours minimum:  Change oxygen saturation probe site  4.  Every 4-6 hours:  If on nasal continuous positive airway pressure, respiratory therapy assess nares and determine need for appliance change or resting period.  4/14/2024 0759 by Patricia Brennan RN  Outcome: Progressing  4/14/2024 0241 by Iveth Santoro RN  Outcome: Progressing  4/13/2024 2218 by Cira Damico RN  Outcome: Progressing

## 2024-04-14 NOTE — PROGRESS NOTES
Bedside and Verbal shift change report given to FAVIAN Lazaro (oncoming nurse) by FAVIAN Denis (offgoing nurse). Report included the following information Nurse Handoff Report, Adult Overview, Intake/Output, MAR, Recent Results, and Event Log.

## 2024-04-15 NOTE — HOSPICE
Alesha Montemayor Admission Note:  Hospice Admission Summary for Alesha Montemayor, 89 year old female, admitted to Hospice services for a terminal diagnosis of advanced Alzheimer's dementia.  Patient's daughter/Primary decision maker, Anaya Cuadra, has elected hospice services and is no longer seeking aggressive treatment. Co-morbidities related to the terminal diagnosis are hypertension, severe aortic stenosis, peritonitis.  Hospice related comorbid conditions are debility, encephalopathy, malnutrition, failure to thrive, falls, renal failure.  Patient also has a past medical history of A-fib, hypothyroidism, and diverticulitis.    Alesha Montemayor status 6 months - 1 year prior to hospice admission:   In March 2023, patient fractured multiple bilateral ribs after a fall.  She was admitted to the hospital then transferred to SNF for rehab.  Prior to this incident, patient lived in an [facility], where she was independent per her family.  Patient was ambulatory with a walker and was eating a regular diet.      Patient was hospitalized again for perforated appendicitis on 2/28/24, which was treated medically due to being high surgical risk.  On 4/11/24, patient presented from skilled nursing facility with complaint of altered mental status.  She was diagnosed with Abdominal pain, suspect related to recently diagnosed perforated appendicitis, with peritonitis, acute renal failure, acute metabolic encephalopathy, fall PTA, and failure to thrive.  Patient was initially started on comfort measures, but she continued to have intermittent pain, agitation with facial grimacing and crying spells despite pharmacological interventions.  Patient was given several doses of IV Morphine and IV Lorazepam without relief.  Per family and medical staff, medications were ineffective.  Patient is now requiring Morphine PCA for symptom management.  She is being admitted for GIP level of care for symptom management.    The family/hospital staff

## 2024-04-15 NOTE — PROGRESS NOTES
MICHELLE received notification from [facility] MICHELLE, Liss # [phone], who advised patient is imminent, pain is being managed by pump.    MICHELLE met with patient family at bedside regarding the post-discharge plan. Family stated their preference is the [facility]. [facility] declined patient and family was updated. In anticipation of patient future discharge from [facility] should there pain be manageable with PO meds, MICHELLE submitted a referral to [facility].    Please note, patient can not be accepted to [facility] on pain medication.  SW to continue to follow and contact the hospice house in the morning regarding referral.       Lisseth Coyne, MSW  Supervisee in Social Work  Care Management Department

## 2024-04-15 NOTE — HOSPICE
Patient/Caregiver/Family/Facility findings/issues during SN visit:  Patient unresponsive to sternal rub. Patient behavior negative for any pain indicators. Patient is on PCA pump with morphine. O2@ 2L via NC. Periods of 3-4 second apnea noted. Patient's family bedside with SN. Patient to continue on daily visits. PPS:10%.    Medication refills ordered this visit: N/A    Medications reconciled and all medications are available in the home this visit. Medications are effective at this time.      Supplies by type and quantity ordered this visit include: N/A    Consulted medical director/attending physician regarding: N/A    Instructed patient/family/caregiver on 24-hour hospice availability and phone number.    Plan for next visit:  routine visit with EOL education and support

## 2024-04-15 NOTE — PROGRESS NOTES
Bedside and Verbal shift change report given to FAVIAN Ramires (oncoming nurse) by FAVIAN Denis (offgoing nurse). Report included the following information Nurse Handoff Report, Adult Overview, Intake/Output, MAR, Recent Results, and Event Log.

## 2024-04-15 NOTE — PROGRESS NOTES
CM and MICHELLE rounded via telephone with [facility]. Hospice reported that patient remains on GIP and hospice nurses continue to reassess her comfort level on the PCA pump and titrate as appropriate to ensure patient is at the lowest level of care required. Patient family prefers [facility] should patient's pain be manageable on PO medications in the future.

## 2024-04-15 NOTE — PROGRESS NOTES
conducted an initial consultation and spiritual assessment for Alesha Montemayor, who is a 89 y.o.,female.     Initiated a relationship of care and support.   Explored issues of grace, belief, spirituality and Shinto/ritual needs.  Listened empathically.  Provided information about Spiritual Care Services.   confirmed Patient's Quaker Affiliation.  Patient processed feelings about current hospitalization.  Offered prayer and assurance of continued prayers on patients behalf.   Chart reviewed.  Patient expressed gratitude for Spiritual Care visit.    Chaplains will continue to follow and will provide pastoral care as needed or requested.    recommends bedside caregivers page  on duty if patient shows signs of acute spiritual or emotional distress.    Chaplain Kimberly Del Angel M.Div.    [phone] - Office

## 2024-04-16 NOTE — PROGRESS NOTES
MICHELLE met with patient's daughter who accompanied her beside, who advised she spoke with Hospice SW, Liss, regarding patient may potentially remain on GIP since patient is imminent. The daughter request SW to follow up [facility] MICHELLE Leija #[phone] , regarding the plan of action for GIP.     MICHELLE called and spoke with Liss, who advised she will research matter regarding GIP, whether patient would discharge from program, and follow up.                Lisseth Coyne, MSW  Supervisee in Social Work  Care Management Department

## 2024-04-16 NOTE — PROGRESS NOTES
Two RN's at the bedside to verify PCA pump with cont morphine. Keyla RN (primary off-going nurse) and Maria Eugenia RN (oncoming RN)

## 2024-04-16 NOTE — PROGRESS NOTES
SW attempted to follow up with [facility] Clinical Director, Swetha Willis #[phone], regarding the online referral made yesterday, and the  advised to call back after 9 am. CMS notified to fax clinicals to [phone]. MICHELLE will continue to follow.         JESSICA Bell  Supervisee in Social Work  Care Management Department

## 2024-04-16 NOTE — PLAN OF CARE
Problem: Chronic Conditions and Co-morbidities  Goal: Patient's chronic conditions and co-morbidity symptoms are monitored and maintained or improved  Outcome: Not Progressing

## 2024-04-16 NOTE — PROGRESS NOTES
New syringe placed in pca pump. Previous syringe with 8ml remaining. Assisting RN Scarlett Blanton witnessed this RN dispose of remaining morphine in Rx waste bin.

## 2024-04-16 NOTE — HOSPICE
Postmortem care provided to patient by hospital staff.  The family was not present.  Encouraged caregiver and family to spend time with  patient.  Daughter stated that she would travel to hospital prior to patient being moved. Provided emotional support.        Caregiver is appropriately grieving.    [facility]  Services to receive remains.

## 2024-04-16 NOTE — PLAN OF CARE
Problem: Chronic Conditions and Co-morbidities  Goal: Patient's chronic conditions and co-morbidity symptoms are monitored and maintained or improved  4/16/2024 1810 by Keyla Godwin, RN  Outcome: Not Progressing  4/16/2024 0614 by Cherise Dela Cruz, RN  Outcome: Not Progressing

## 2024-04-16 NOTE — HOSPICE
Patient is in bed, unresponsive. Daughter and son are right by her side. They are both handling this ok. Daughter is anxious because she has been caring for mom for the last decade. CHplain will continue to follow.

## 2024-04-16 NOTE — PROGRESS NOTES
2000- Bedside and Verbal shift change report given to FAVIAN Luong (oncoming nurse) by FAVIAN Callahan (offgoing nurse). Report included the following information Nurse Handoff Report, Adult Overview, Intake/Output, MAR, and Recent Results.      2010- Shift assessment done as per flow sheet.    0145- Wasted 1 mg of Morphine with Alessia Sterling RN.    0708- Bedside and Verbal shift change report given to FAVIAN Ramires (oncoming nurse) by FAVIAN Luong (offgoing nurse). Report included the following information Nurse Handoff Report, Adult Overview, Intake/Output, MAR, and Recent Results.

## 2024-04-16 NOTE — PROGRESS NOTES
Patient left voice message for  [facility] # [phone] to return SW to [phone]. SW also called and left a message for [facility] Intake # [phone] for a return call.        1:42 pm- SW received a return call from [facility] Intake/Admission Rep, Dina, who advised they have availability but it would be self pay for placement. SW will continue to follow.      JESSICA Bell  Supervisee in Social Work  Care Management Department

## 2024-04-16 NOTE — PROGRESS NOTES
MICHELLE made a follow up to the [facility] and spoke with the Clinical Director, Swetha # [phone], who advised they do not have any available bed and there is a wait list. MICHELLE also inquired about facility acceptance of PCA. Swetha advised their facility  do not accept peripheral IV pain medication administers. They only will take a patient if they have a picc or midline. MICHELLE updated [facility] RN Aubree, regarding the [facility]'s respond. MICHELLE will continue to  follow.    JESSICA Bell  Supervisee in Social Work  Care Management Department

## 2024-04-16 NOTE — PROGRESS NOTES
Patient found with no pulse or respirations. Confirmed with FAVIAN Ramires. Keyla calling family. This RN paged Dr. Rehman and received a call back, MD to pronounce time of death. FAVIAN Samuel called RN supervisor,  waiting to speak with .       Family in room      This RN called [employer] and left a message       home called, this RN to call back when patient is ready. Daughter, Anaya Cuadra signed release. Family seen by .      Family left.       No return call from [employer], this RN called again. This RN preparing patient for  home .      This RN called [facility] to inform them that patient is ready to be transported. This RN called FAVIAN Garcia supervisor and was informed that Jose from [employer] called at  to release the patient.       Body removed by [facility] transport.

## 2024-04-17 LAB
BACTERIA SPEC CULT: NORMAL
SERVICE CMNT-IMP: NORMAL

## 2024-04-17 NOTE — HOSPICE
Today's SW visit was to provide EOL emotional support for the family of Alesha Montemayor.  SW was welcomed into the hospital room by Anaya, daughter of pt.  SW explained level of care change and assured CG that pt's care and comfort is hospice main priority.  SW sat with daughter as she discussed pt's life and family.  Pt's appears comfortable but imminent.  Breath sounds changed while SW was visiting to more shallow/short breaths.  EOL signs and symptoms discussed with daughter.  Daughter is accepting of imminent death.  Pt is dressed approriately and there are no visible signs of abuse neglect noted.  SW will continue increased visits to provide emotional support to the family.

## 2024-04-17 NOTE — HOSPICE
Patient/Caregiver/Family/Facility findings/issues during SN visit:  Patient remains nonresponsive to sternal rub. Patient with mouth breathing, respirations irregular with periods of apnea lasting 6-7 seconds. O2 @ 2L via NC. HR irregular, tachycardic. Pupils fixed. Ears pinned back. BLE cold to touch. Mottling to both feet. Patient on PCA Morphine and is effective. PPS: 10%. Patient remains on daily SN visits. Patient's family in vist with SN. SN discussed with family that patient is very imminent and will probably pass in the newxt 24 hours.    Medication refills ordered this visit: N/A    Medications reconciled and all medications are available in the home this visit. Medications are effective at this time.      Supplies by type and quantity ordered this visit include: N/A    Consulted medical director/attending physician regarding: N/A    Instructed patient/family/caregiver on 24-hour hospice availability and phone number.    Plan for next visit:  routine daily visit with continued EOL education and support

## 2024-04-17 NOTE — HOSPICE
The purpose of today's visit was to complete an intial SW assessment for Alesha Montemayor.  Alesha Montemayor is a 89 year old female admitted to hospice on 24 with a terminal diagnosis of debility and acute apendicitis.  Pt was recieved in her hospital room, GIP placement. SW was welcomed into the room by pt's 2 daughters, son and son in law.  Pt is dressed appropriately and does not appear to be any distress.  Pt is A & O x 0.  Pain appears to be controlled with PCA pump.  SW educated family on finding placement to lower level of care.  Family agrees to placement at [facility] or other local SNF if available. Pt was in a local assisted living facility and enjoyed the activities and placement.  Family reports that they believe she suffered burst appendix and became septic.  Prior to illness, pt was relatively healthy for her age.  Family supportive of pt.  Emotional support provided during visit.  Family state that final arrangements will be provided by [facility].  Bereavement risk for pt's children is a low risk.  Family understands  availability of hospice staff and their right to attend IDG meetings by phone.  Family agrees to POC.

## 2024-04-17 NOTE — DISCHARGE SUMMARY
Death summary    Date of admission: 24    Date of death: 24    HPI:Alesha Montemayor is a 89 y.o. female who initially came to hospital with complaint of abdominal pain and was found out to have acute appendicitis with perforation.  Patient received conservative management as she was high risk for surgery.  Patient was initially started on comfort measures but she continued to have intermittent pain, agitation and shortness of breath with facial grimacing and crying spells.  Palliative care and hospice care were consulted.  Patient has 2 children/next legal of kin.  After having discussion with them, it was decided to transfer this patient to general inpatient hospice service for management of her uncontrolled symptoms with help of morphine PCA.  Patient remains on inpatient hospice service and her symptoms were well-controlled with morphine PCA, scopolamine patch and as needed narcotics.  Patient was pronounced dead on 2024.    Discharge diagnoses but not limited to the followin.  Advanced Alzheimer dementia with worsening encephalopathy  2.  Appendicitis with perforation  3.  Debility and malnutrition  4.  Hypertension  5.  Atrial fibrillation and severe aortic stenosis

## 2024-04-17 NOTE — PROGRESS NOTES
Bereavement Note:     responded to the death of Alesha Montemayor, who is a 89 y.o., female, offering Spiritual Care to patient and family.    Date of Death: 24    Extended Emergency Contact Information  Primary Emergency Contact: Anaya Cuadra  Address: [address] [place] [id number] [facility]  Home Phone: [phone]  Mobile Phone: [phone]  Relation: Child  Secondary Emergency Contact: Marc Montemayor  Mobile Phone: [phone]  Relation: Child      Home: [place]    Chaplains will continue to follow family and will provide spiritual care as needed.

## 2024-04-17 NOTE — DISCHARGE SUMMARY
[address]  [address]                            DISCHARGE SUMMARY      PATIENT NAME: BETO DURON           : 1927  MED REC NO: [id number]                       ROOM: 329  ACCOUNT NO: [id number]                       ADMIT DATE: 2024  PROVIDER: Felicity Sandoval MD    DISCHARGE DATE:  2024      This is a transfer/discharge summary to hospice.    HOSPITAL SUMMARY:  This 89-year-old patient came with abdominal pain, fall, and failure to thrive.  She had recently had diagnosis of perforated appendicitis and peritonitis that had been medically managed and conservatively due to increased risk for surgical intervention with her age and advanced medical issues.  The patient had metabolic encephalopathy.  She has a history of severe aortic stenosis and the family transitioned her into comfort care during her initial hospitalization.  The patient's examination is as noted in the chart on 2024.  Palliative team took over at that point and asked for a transition to inpatient hospice.  The patient was discharged to inpatient hospice in condition of comfort care and progressive terminal diagnosis at that point.  The patient's family member was aware and the examination is as noted on the progress note.        FELICITY SANDOVAL MD      RHI/AQS  D:  2024 08:22:38  T:  2024 13:40:02  JOB #:  [id number]

## 2024-04-18 LAB
BACTERIA SPEC CULT: NORMAL
SERVICE CMNT-IMP: NORMAL

## (undated) DEVICE — TROCAR LAP L100MM DIA5MM BLDELSS W/ STBL SL ENDOPATH XCEL

## (undated) DEVICE — APPLICATOR BNDG 1MM ADH PREMIERPRO EXOFIN

## (undated) DEVICE — SOL IRRIGATION INJ NACL 0.9% 500ML BTL

## (undated) DEVICE — SUT MONOCRYL PLUS UD 4-0 --

## (undated) DEVICE — APPLIER CLP M L L11.4IN DIA10MM ENDOSCP ROT MULT FOR LIG

## (undated) DEVICE — GOWN,SIRUS,NONRNF,SETINSLV,XL,20/CS: Brand: MEDLINE

## (undated) DEVICE — DRAPE TWL SURG 16X26IN BLU ORB04] ALLCARE INC]

## (undated) DEVICE — 4-PORT MANIFOLD: Brand: NEPTUNE 2

## (undated) DEVICE — KENDALL SCD EXPRESS SLEEVES, KNEE LENGTH, MEDIUM: Brand: KENDALL SCD

## (undated) DEVICE — SKIN MARKER,REGULAR TIP WITH RULER AND LABELS: Brand: DEVON

## (undated) DEVICE — STERILE POLYISOPRENE POWDER-FREE SURGICAL GLOVES WITH EMOLLIENT COATING: Brand: PROTEXIS

## (undated) DEVICE — [HIGH FLOW INSUFFLATOR,  DO NOT USE IF PACKAGE IS DAMAGED,  KEEP DRY,  KEEP AWAY FROM SUNLIGHT,  PROTECT FROM HEAT AND RADIOACTIVE SOURCES.]: Brand: PNEUMOSURE

## (undated) DEVICE — Device

## (undated) DEVICE — NEEDLE INSUF L120MM DIA2MM DISP FOR PNEUMOPERI ENDOPATH

## (undated) DEVICE — SLEEVE TRCR L100MM DIA5MM UNIV STBL FOR BLDELSS DIL TIP

## (undated) DEVICE — TROCAR ENDOSCP L100MM DIA12MM STBL SL BLDELSS ENDOPATH XCEL

## (undated) DEVICE — DEVON™ KNEE AND BODY STRAP 60" X 3" (1.5 M X 7.6 CM): Brand: DEVON

## (undated) DEVICE — SYR 10ML CTRL LR LCK NSAF LF --

## (undated) DEVICE — CORDLESS ULTRASONIC DISSECTOR: Brand: SONICISION

## (undated) DEVICE — STERILE POLYISOPRENE POWDER-FREE SURGICAL GLOVES: Brand: PROTEXIS

## (undated) DEVICE — SUT VCRL + 0 36IN UR6 VIO --

## (undated) DEVICE — VISUALIZATION SYSTEM: Brand: CLEARIFY

## (undated) DEVICE — SPONGE LAP 18X18IN STRL -- 5/PK